# Patient Record
Sex: MALE | Race: WHITE | HISPANIC OR LATINO | URBAN - METROPOLITAN AREA
[De-identification: names, ages, dates, MRNs, and addresses within clinical notes are randomized per-mention and may not be internally consistent; named-entity substitution may affect disease eponyms.]

---

## 2017-12-15 ENCOUNTER — INPATIENT (INPATIENT)
Facility: HOSPITAL | Age: 77
LOS: 33 days | Discharge: NURSING, REHAB OR LONG-TERM CARE | End: 2018-01-18
Attending: HOSPITALIST | Admitting: HOSPITALIST
Payer: MEDICARE

## 2017-12-15 VITALS
OXYGEN SATURATION: 99 % | SYSTOLIC BLOOD PRESSURE: 136 MMHG | RESPIRATION RATE: 16 BRPM | TEMPERATURE: 98 F | HEART RATE: 60 BPM | DIASTOLIC BLOOD PRESSURE: 81 MMHG

## 2017-12-15 DIAGNOSIS — R56.9 UNSPECIFIED CONVULSIONS: ICD-10-CM

## 2017-12-15 LAB
ALBUMIN SERPL ELPH-MCNC: 3.8 G/DL — SIGNIFICANT CHANGE UP (ref 3.3–5)
ALP SERPL-CCNC: 148 U/L — HIGH (ref 40–120)
ALT FLD-CCNC: 11 U/L — SIGNIFICANT CHANGE UP (ref 4–41)
AMPHET UR-MCNC: NEGATIVE — SIGNIFICANT CHANGE UP
APAP SERPL-MCNC: < 15 UG/ML — LOW (ref 15–25)
APPEARANCE UR: CLEAR — SIGNIFICANT CHANGE UP
AST SERPL-CCNC: 20 U/L — SIGNIFICANT CHANGE UP (ref 4–40)
BACTERIA # UR AUTO: SIGNIFICANT CHANGE UP
BARBITURATES MEASUREMENT: NEGATIVE — SIGNIFICANT CHANGE UP
BARBITURATES UR SCN-MCNC: NEGATIVE — SIGNIFICANT CHANGE UP
BASE EXCESS BLDV CALC-SCNC: 2.3 MMOL/L — SIGNIFICANT CHANGE UP
BASOPHILS # BLD AUTO: 0.07 K/UL — SIGNIFICANT CHANGE UP (ref 0–0.2)
BASOPHILS NFR BLD AUTO: 0.5 % — SIGNIFICANT CHANGE UP (ref 0–2)
BENZODIAZ SERPL-MCNC: NEGATIVE — SIGNIFICANT CHANGE UP
BENZODIAZ UR-MCNC: NEGATIVE — SIGNIFICANT CHANGE UP
BILIRUB SERPL-MCNC: 0.5 MG/DL — SIGNIFICANT CHANGE UP (ref 0.2–1.2)
BILIRUB UR-MCNC: NEGATIVE — SIGNIFICANT CHANGE UP
BLOOD GAS VENOUS - CREATININE: 1.35 MG/DL — HIGH (ref 0.5–1.3)
BLOOD UR QL VISUAL: NEGATIVE — SIGNIFICANT CHANGE UP
BUN SERPL-MCNC: 15 MG/DL — SIGNIFICANT CHANGE UP (ref 7–23)
CALCIUM SERPL-MCNC: 9 MG/DL — SIGNIFICANT CHANGE UP (ref 8.4–10.5)
CANNABINOIDS UR-MCNC: NEGATIVE — SIGNIFICANT CHANGE UP
CHLORIDE BLDV-SCNC: 109 MMOL/L — HIGH (ref 96–108)
CHLORIDE SERPL-SCNC: 103 MMOL/L — SIGNIFICANT CHANGE UP (ref 98–107)
CK MB BLD-MCNC: 2.6 — HIGH (ref 0–2.5)
CK MB BLD-MCNC: 4.45 NG/ML — SIGNIFICANT CHANGE UP (ref 1–6.6)
CK SERPL-CCNC: 174 U/L — SIGNIFICANT CHANGE UP (ref 30–200)
CK SERPL-CCNC: 174 U/L — SIGNIFICANT CHANGE UP (ref 30–200)
CO2 SERPL-SCNC: 24 MMOL/L — SIGNIFICANT CHANGE UP (ref 22–31)
COCAINE METAB.OTHER UR-MCNC: NEGATIVE — SIGNIFICANT CHANGE UP
COLOR SPEC: SIGNIFICANT CHANGE UP
CREAT SERPL-MCNC: 1.4 MG/DL — HIGH (ref 0.5–1.3)
EOSINOPHIL # BLD AUTO: 0.02 K/UL — SIGNIFICANT CHANGE UP (ref 0–0.5)
EOSINOPHIL NFR BLD AUTO: 0.1 % — SIGNIFICANT CHANGE UP (ref 0–6)
ETHANOL BLD-MCNC: < 10 MG/DL — SIGNIFICANT CHANGE UP
GAS PNL BLDV: 140 MMOL/L — SIGNIFICANT CHANGE UP (ref 136–146)
GLUCOSE BLDV-MCNC: 87 — SIGNIFICANT CHANGE UP (ref 70–99)
GLUCOSE SERPL-MCNC: 85 MG/DL — SIGNIFICANT CHANGE UP (ref 70–99)
GLUCOSE UR-MCNC: NEGATIVE — SIGNIFICANT CHANGE UP
HCO3 BLDV-SCNC: 25 MMOL/L — SIGNIFICANT CHANGE UP (ref 20–27)
HCT VFR BLD CALC: 38 % — LOW (ref 39–50)
HCT VFR BLDV CALC: 40.2 % — SIGNIFICANT CHANGE UP (ref 39–51)
HGB BLD-MCNC: 12.4 G/DL — LOW (ref 13–17)
HGB BLDV-MCNC: 13.1 G/DL — SIGNIFICANT CHANGE UP (ref 13–17)
HYALINE CASTS # UR AUTO: SIGNIFICANT CHANGE UP (ref 0–?)
IMM GRANULOCYTES # BLD AUTO: 0.07 # — SIGNIFICANT CHANGE UP
IMM GRANULOCYTES NFR BLD AUTO: 0.5 % — SIGNIFICANT CHANGE UP (ref 0–1.5)
KETONES UR-MCNC: NEGATIVE — SIGNIFICANT CHANGE UP
LACTATE BLDV-MCNC: 2.5 MMOL/L — HIGH (ref 0.5–2)
LEUKOCYTE ESTERASE UR-ACNC: NEGATIVE — SIGNIFICANT CHANGE UP
LYMPHOCYTES # BLD AUTO: 0.84 K/UL — LOW (ref 1–3.3)
LYMPHOCYTES # BLD AUTO: 5.7 % — LOW (ref 13–44)
MCHC RBC-ENTMCNC: 29 PG — SIGNIFICANT CHANGE UP (ref 27–34)
MCHC RBC-ENTMCNC: 32.6 % — SIGNIFICANT CHANGE UP (ref 32–36)
MCV RBC AUTO: 89 FL — SIGNIFICANT CHANGE UP (ref 80–100)
METHADONE UR-MCNC: NEGATIVE — SIGNIFICANT CHANGE UP
MONOCYTES # BLD AUTO: 0.98 K/UL — HIGH (ref 0–0.9)
MONOCYTES NFR BLD AUTO: 6.7 % — SIGNIFICANT CHANGE UP (ref 2–14)
MUCOUS THREADS # UR AUTO: SIGNIFICANT CHANGE UP
NEUTROPHILS # BLD AUTO: 12.64 K/UL — HIGH (ref 1.8–7.4)
NEUTROPHILS NFR BLD AUTO: 86.5 % — HIGH (ref 43–77)
NITRITE UR-MCNC: NEGATIVE — SIGNIFICANT CHANGE UP
NRBC # FLD: 0 — SIGNIFICANT CHANGE UP
OPIATES UR-MCNC: NEGATIVE — SIGNIFICANT CHANGE UP
OXYCODONE UR-MCNC: NEGATIVE — SIGNIFICANT CHANGE UP
PCO2 BLDV: 45 MMHG — SIGNIFICANT CHANGE UP (ref 41–51)
PCP UR-MCNC: NEGATIVE — SIGNIFICANT CHANGE UP
PH BLDV: 7.39 PH — SIGNIFICANT CHANGE UP (ref 7.32–7.43)
PH UR: 6 — SIGNIFICANT CHANGE UP (ref 4.6–8)
PLATELET # BLD AUTO: 314 K/UL — SIGNIFICANT CHANGE UP (ref 150–400)
PMV BLD: 8.9 FL — SIGNIFICANT CHANGE UP (ref 7–13)
PO2 BLDV: 29 MMHG — LOW (ref 35–40)
POTASSIUM BLDV-SCNC: 2.4 MMOL/L — CRITICAL LOW (ref 3.4–4.5)
POTASSIUM SERPL-MCNC: 2.8 MMOL/L — CRITICAL LOW (ref 3.5–5.3)
POTASSIUM SERPL-SCNC: 2.8 MMOL/L — CRITICAL LOW (ref 3.5–5.3)
PROT SERPL-MCNC: 7.5 G/DL — SIGNIFICANT CHANGE UP (ref 6–8.3)
PROT UR-MCNC: 10 MG/DL — SIGNIFICANT CHANGE UP
RBC # BLD: 4.27 M/UL — SIGNIFICANT CHANGE UP (ref 4.2–5.8)
RBC # FLD: 14.2 % — SIGNIFICANT CHANGE UP (ref 10.3–14.5)
RBC CASTS # UR COMP ASSIST: SIGNIFICANT CHANGE UP (ref 0–?)
SALICYLATES SERPL-MCNC: < 5 MG/DL — LOW (ref 15–30)
SAO2 % BLDV: 50.7 % — LOW (ref 60–85)
SODIUM SERPL-SCNC: 145 MMOL/L — SIGNIFICANT CHANGE UP (ref 135–145)
SP GR SPEC: 1.01 — SIGNIFICANT CHANGE UP (ref 1–1.04)
SQUAMOUS # UR AUTO: SIGNIFICANT CHANGE UP
TROPONIN T SERPL-MCNC: < 0.06 NG/ML — SIGNIFICANT CHANGE UP (ref 0–0.06)
TSH SERPL-MCNC: 1.46 UIU/ML — SIGNIFICANT CHANGE UP (ref 0.27–4.2)
UROBILINOGEN FLD QL: NORMAL MG/DL — SIGNIFICANT CHANGE UP
WBC # BLD: 14.62 K/UL — HIGH (ref 3.8–10.5)
WBC # FLD AUTO: 14.62 K/UL — HIGH (ref 3.8–10.5)
WBC UR QL: SIGNIFICANT CHANGE UP (ref 0–?)

## 2017-12-15 PROCEDURE — 71010: CPT | Mod: 26

## 2017-12-15 PROCEDURE — 70450 CT HEAD/BRAIN W/O DYE: CPT | Mod: 26

## 2017-12-15 RX ORDER — TETANUS TOXOID, REDUCED DIPHTHERIA TOXOID AND ACELLULAR PERTUSSIS VACCINE, ADSORBED 5; 2.5; 8; 8; 2.5 [IU]/.5ML; [IU]/.5ML; UG/.5ML; UG/.5ML; UG/.5ML
0.5 SUSPENSION INTRAMUSCULAR ONCE
Qty: 0 | Refills: 0 | Status: COMPLETED | OUTPATIENT
Start: 2017-12-15 | End: 2017-12-15

## 2017-12-15 RX ORDER — POTASSIUM CHLORIDE 20 MEQ
10 PACKET (EA) ORAL
Qty: 0 | Refills: 0 | Status: COMPLETED | OUTPATIENT
Start: 2017-12-15 | End: 2017-12-16

## 2017-12-15 RX ORDER — POTASSIUM CHLORIDE 20 MEQ
20 PACKET (EA) ORAL
Qty: 0 | Refills: 0 | Status: DISCONTINUED | OUTPATIENT
Start: 2017-12-15 | End: 2017-12-15

## 2017-12-15 RX ORDER — MAGNESIUM SULFATE 500 MG/ML
2 VIAL (ML) INJECTION ONCE
Qty: 0 | Refills: 0 | Status: COMPLETED | OUTPATIENT
Start: 2017-12-15 | End: 2017-12-15

## 2017-12-15 RX ORDER — LEVETIRACETAM 250 MG/1
500 TABLET, FILM COATED ORAL ONCE
Qty: 0 | Refills: 0 | Status: COMPLETED | OUTPATIENT
Start: 2017-12-15 | End: 2017-12-15

## 2017-12-15 RX ORDER — SODIUM CHLORIDE 9 MG/ML
1000 INJECTION INTRAMUSCULAR; INTRAVENOUS; SUBCUTANEOUS ONCE
Qty: 0 | Refills: 0 | Status: COMPLETED | OUTPATIENT
Start: 2017-12-15 | End: 2017-12-15

## 2017-12-15 RX ADMIN — TETANUS TOXOID, REDUCED DIPHTHERIA TOXOID AND ACELLULAR PERTUSSIS VACCINE, ADSORBED 0.5 MILLILITER(S): 5; 2.5; 8; 8; 2.5 SUSPENSION INTRAMUSCULAR at 22:03

## 2017-12-15 RX ADMIN — Medication 50 GRAM(S): at 23:30

## 2017-12-15 RX ADMIN — Medication 50 MILLIEQUIVALENT(S): at 22:04

## 2017-12-15 RX ADMIN — Medication 2 MILLIGRAM(S): at 21:04

## 2017-12-15 RX ADMIN — Medication 100 MILLIEQUIVALENT(S): at 23:30

## 2017-12-15 RX ADMIN — SODIUM CHLORIDE 2000 MILLILITER(S): 9 INJECTION INTRAMUSCULAR; INTRAVENOUS; SUBCUTANEOUS at 20:49

## 2017-12-15 RX ADMIN — LEVETIRACETAM 400 MILLIGRAM(S): 250 TABLET, FILM COATED ORAL at 22:48

## 2017-12-15 NOTE — CONSULT NOTE ADULT - SUBJECTIVE AND OBJECTIVE BOX
Neurology Consult    Name  DELGADO SUE    Patient is a 77 year old man with unknown PMH BIBEMS found outside shoveling snow soaking wet by neighbors. As per EMS patient was shoveling snow in front of random houses. Patient states he was shoveling snow at his house and does not know why he is at the hospital. Denies any complaints. States that he lives alone and does not know any contact information for family. While in the ED, he was noted to have had 2 seizures, the second breaking with Ativan.                                                          MEDICATIONS  (STANDING):  potassium chloride  20 mEq/100 mL IVPB 20 milliEquivalent(s) IV Intermittent every 2 hours    MEDICATIONS  (PRN):      Allergies    No Known Allergies    Intolerances        Objective  Vital Signs Last 24 Hrs  T(C): 37.1 (15 Dec 2017 20:07), Max: 37.1 (15 Dec 2017 20:07)  T(F): 98.8 (15 Dec 2017 20:07), Max: 98.8 (15 Dec 2017 20:07)  HR: 98 (15 Dec 2017 21:00) (60 - 98)  BP: 224/100 (15 Dec 2017 21:00) (136/81 - 224/100)  BP(mean): --  RR: 18 (15 Dec 2017 21:00) (16 - 18)  SpO2: 98% (15 Dec 2017 21:00) (98% - 99%)    General Exam   General appearance: No acute distress, well-nourished  Respiratory:    non-labored respirations               Neurological Exam  Mental Status:  alert and oriented x3, fluent speech, following commands, repetition and naming intact    Cranial Nerves: PERRL, EOMI without nystagmus, visual fields intact no facial droop, no dysarthria    Motor:   Tone:   normal               Strength:  Upper extremity                          Delt       Bicep    Tricep                                                  R         5/5        5/5        5/5       5/5                                               L          5/5        5/5        5/5      5/5    Lower extremity                           HF          KE          KF        DF         PF                                               R        5/5        5/5        5/5       5/5       5/5                                               L         5/5        5/5        5/5      5/5        5/5    Pronator drift:   none           Dysmetria: none with finger-to-nose testing  Tremor:  none appreciated at rest or in action    Sensation: intact grossly to light touch    Deep Tendon Reflexes:   Toes flexor bilaterally ________    Gait:     Other Studies    12-15    145  |  103  |  15  ----------------------------<  85  2.8<LL>   |  24  |  1.40<H>    Ca    9.0      15 Dec 2017 19:40    TPro  7.5  /  Alb  3.8  /  TBili  0.5  /  DBili  x   /  AST  20  /  ALT  11  /  AlkPhos  148<H>  12-15    12-15    145  |  103  |  15  ----------------------------<  85  2.8<LL>   |  24  |  1.40<H>    Ca    9.0      15 Dec 2017 19:40    TPro  7.5  /  Alb  3.8  /  TBili  0.5  /  DBili  x   /  AST  20  /  ALT  11  /  AlkPhos  148<H>  12-15    LIVER FUNCTIONS - ( 15 Dec 2017 19:40 )  Alb: 3.8 g/dL / Pro: 7.5 g/dL / ALK PHOS: 148 u/L / ALT: 11 u/L / AST: 20 u/L / GGT: x             Radiology    CTh/CTA:  MRI/MRA:  TTE:  EEG: Neurology Consult    Name  DELGADO SUE    Patient is a 77 year old man with unknown PMH BIBEMS found outside shoveling snow soaking wet by neighbors. As per EMS patient was shoveling snow in front of random houses. Patient states he was shoveling snow at his house and does not know why he is at the hospital. Denies any complaints. States that he lives alone and does not know any contact information for family. While in the ED, he was noted to have had 2 seizures, treated with 2mg Ativan.                                                            MEDICATIONS  (STANDING):  potassium chloride  20 mEq/100 mL IVPB 20 milliEquivalent(s) IV Intermittent every 2 hours    MEDICATIONS  (PRN):      Allergies    No Known Allergies    Intolerances        Objective  Vital Signs Last 24 Hrs  T(C): 37.1 (15 Dec 2017 20:07), Max: 37.1 (15 Dec 2017 20:07)  T(F): 98.8 (15 Dec 2017 20:07), Max: 98.8 (15 Dec 2017 20:07)  HR: 98 (15 Dec 2017 21:00) (60 - 98)  BP: 224/100 (15 Dec 2017 21:00) (136/81 - 224/100)  BP(mean): --  RR: 18 (15 Dec 2017 21:00) (16 - 18)  SpO2: 98% (15 Dec 2017 21:00) (98% - 99%)    General Exam   General appearance: No acute distress, well-nourished  Respiratory:    non-labored respirations               Neurological Exam  Mental Status:  awake, not oriented, not following commands    Cranial Nerves: PERRL, EOMI without nystagmus, visual fields intact no facial droop, no dysarthria    Motor:   Tone:   normal               Strength:  Moving all extremities spontaneously    Tremor:  none appreciated at rest or in action    Sensation: intact grossly to light touch    Deep Tendon Reflexes: 2+ throughout  Toes flexor bilaterally     Gait: deferred    Other Studies    12-15    145  |  103  |  15  ----------------------------<  85  2.8<LL>   |  24  |  1.40<H>    Ca    9.0      15 Dec 2017 19:40    TPro  7.5  /  Alb  3.8  /  TBili  0.5  /  DBili  x   /  AST  20  /  ALT  11  /  AlkPhos  148<H>  12-15    12-15    145  |  103  |  15  ----------------------------<  85  2.8<LL>   |  24  |  1.40<H>    Ca    9.0      15 Dec 2017 19:40    TPro  7.5  /  Alb  3.8  /  TBili  0.5  /  DBili  x   /  AST  20  /  ALT  11  /  AlkPhos  148<H>  12-15    LIVER FUNCTIONS - ( 15 Dec 2017 19:40 )  Alb: 3.8 g/dL / Pro: 7.5 g/dL / ALK PHOS: 148 u/L / ALT: 11 u/L / AST: 20 u/L / GGT: x             Radiology    CTH:  No acute intracranial hemorrhage, mass effect or evidence of acute   territorial infarct. Mild ventriculomegaly superimposed on cerebral   volume loss.

## 2017-12-15 NOTE — ED PROVIDER NOTE - MEDICAL DECISION MAKING DETAILS
77F unknown PMH found shoveling snow in random houses. Will workup for AMS and attempt to call family. Will order cbc, cmp, vbg, utox, ua/uc, cxr, ct head and reassess

## 2017-12-15 NOTE — CONSULT NOTE ADULT - ASSESSMENT
77 year old man with unknown PMH BIBEMS found outside shoveling snow soaking wet by neighbors, brought to ED, where he had 2 witnessed seizures, treated w/ Ativan.  Neurological exam limited by patient participation/mental status.  CTH showing no acute abnormalities.    Plan:  - start Keppra 500mg BID  - routine EEG  - attempt to contact family for collateral history

## 2017-12-15 NOTE — ED ADULT NURSE REASSESSMENT NOTE - NS ED NURSE REASSESS COMMENT FT1
No acute distress at present. Pt. is resting comfortably. Respirations are even and unlabored on room air. Pt. is admitted to tele bed 406C. Report given to SHANNAN Delong. Pt. is awaiting transport at present. Will continue to monitor.

## 2017-12-15 NOTE — ED PROVIDER NOTE - OBJECTIVE STATEMENT
77M with unknown PMH BIBEMS found outside shoveling snow soaking wet by neighbors. As per EMS patient was shoveling 77M with unknown PMH BIBEMS found outside shoveling snow soaking wet by neighbors. As per EMS patient was shoveling snow in random houses. Patient states he was shoveling snow at his house and does not know why he is at the hospital. Denies any complaints. 77M with unknown PMH BIBEMS found outside shoveling snow soaking wet by neighbors. As per EMS patient was shoveling snow in front of random houses. Patient states he was shoveling snow at his house and does not know why he is at the hospital. Denies any complaints. States that he lives alone and does not know any contact information for family.

## 2017-12-15 NOTE — ED ADULT TRIAGE NOTE - CHIEF COMPLAINT QUOTE
Pt brought in from home by EMS, pt was outside shoveling x2 hrs was found outside by ems soaking wet, concerned neighbor or called 911. PMHx dementia. Pt denies any medical complaints

## 2017-12-15 NOTE — ED ADULT NURSE REASSESSMENT NOTE - NS ED NURSE REASSESS COMMENT FT1
Pt. had 2 seizure episodes within 10 minutes, lasting 1 minute each episode. MD Mayorga, MD Fountain, and MD Sylvester at bedside during seizures. 2 mg of Ativan given IV push. Pt. is to be taken to CT by transporter as per MD Mayorga. Pt. being transported at present.

## 2017-12-15 NOTE — ED PROVIDER NOTE - ATTENDING CONTRIBUTION TO CARE
77M p/w found outside during snowstorm, apparently was shoveling snow on others' property, EMS called, pt was soaking wet.  Pt nonverbal during our communication, makes eye contact but unable to relate any details of himself, intermittently follows simple commands.  No previous visits in our EMR.  Phone number given by EMS rings with no answer or answering machine.  VS:  unremarkable    GEN - mild distress, alert, nonverbal   HEAD - NC/AT     ENT - PEERL, EOMI, mucous membranes dry , no discharge      NECK: Neck supple, non-tender without lymphadenopathy, no masses, no JVD  PULM - CTA b/l,  symmetric breath sounds  COR -  normal heart sounds    ABD - , ND, NT, soft, no guarding, no rebound, no masses    BACK - no CVA tenderness, nontender spine     EXTREMS - no edema, no deformity, warm and well perfused    SKIN - no rash or bruising      NEUROLOGIC - alert, moves all extremities but exam limited due to limited following of commands.      IMP:    77M p/w found acting inappropriately BIBEMS.  No other information given, pt currently nonverbal.  NB after initial eval pt found to have GTC sz lasting approx 1-2 minutes with post ictal state, FS adequate, pt made safe.  10 mins later had another sz, same.  Given ativan at that time, held initially due to unclear mechanism or history, didn't want to oversedate him.  No apparent injury, normothermic.  Potentially CVA affecting speech center vs delirium.  Check labs, CXR, CT head, give fluids, replete lytes, neuro eval, admit for further w/u.

## 2017-12-15 NOTE — ED ADULT NURSE NOTE - OBJECTIVE STATEMENT
The patient is a 76 y/o male alert and oriented to person only BIB EMS found wandering on street.  Patient is calm and cooperative, confused however redirectable.  Social Work (Misael [phone]) contacted and advised.  The patient offers no complaints, is minimally verbal and refuses answer most questions.  Patient refused to answer question about CP, SOB, GI/ symptoms, n/v/d, fevers/chills.  Patient placed in hospital gowns, belongings placed in locker across from room 21.  VSS, patient in nad, MD evaluating. The patient is a 76 y/o male alert and oriented to person only BIB EMS found wandering on street.  Patient is calm and cooperative, confused however redirectable.  Social Work (Misael [phone]) contacted and advised.  The patient offers no complaints, is minimally verbal and refuses answer most questions.  Patient refused to answer question about CP, SOB, GI/ symptoms, n/v/d, fevers/chills.  Patient placed in hospital gowns, belongings placed in locker across from room 21.  VSS, patient in nad, MD evaluating.  Valuables including wallet, sent to [facility] security office safe.  Yellow copy to retrieve belongings from safe placed in patient's chart.

## 2017-12-16 DIAGNOSIS — Z29.9 ENCOUNTER FOR PROPHYLACTIC MEASURES, UNSPECIFIED: ICD-10-CM

## 2017-12-16 DIAGNOSIS — D72.829 ELEVATED WHITE BLOOD CELL COUNT, UNSPECIFIED: ICD-10-CM

## 2017-12-16 DIAGNOSIS — F03.90 UNSPECIFIED DEMENTIA WITHOUT BEHAVIORAL DISTURBANCE: ICD-10-CM

## 2017-12-16 DIAGNOSIS — N28.9 DISORDER OF KIDNEY AND URETER, UNSPECIFIED: ICD-10-CM

## 2017-12-16 DIAGNOSIS — E87.6 HYPOKALEMIA: ICD-10-CM

## 2017-12-16 DIAGNOSIS — R56.9 UNSPECIFIED CONVULSIONS: ICD-10-CM

## 2017-12-16 LAB
ALBUMIN SERPL ELPH-MCNC: 3.3 G/DL — SIGNIFICANT CHANGE UP (ref 3.3–5)
ALP SERPL-CCNC: 137 U/L — HIGH (ref 40–120)
ALT FLD-CCNC: 11 U/L — SIGNIFICANT CHANGE UP (ref 4–41)
APAP SERPL-MCNC: < 15 UG/ML — LOW (ref 15–25)
AST SERPL-CCNC: 23 U/L — SIGNIFICANT CHANGE UP (ref 4–40)
BARBITURATES MEASUREMENT: NEGATIVE — SIGNIFICANT CHANGE UP
BENZODIAZ SERPL-MCNC: NEGATIVE — SIGNIFICANT CHANGE UP
BILIRUB SERPL-MCNC: 0.5 MG/DL — SIGNIFICANT CHANGE UP (ref 0.2–1.2)
BUN SERPL-MCNC: 11 MG/DL — SIGNIFICANT CHANGE UP (ref 7–23)
CALCIUM SERPL-MCNC: 8.4 MG/DL — SIGNIFICANT CHANGE UP (ref 8.4–10.5)
CHLORIDE SERPL-SCNC: 105 MMOL/L — SIGNIFICANT CHANGE UP (ref 98–107)
CHOLEST SERPL-MCNC: 170 MG/DL — SIGNIFICANT CHANGE UP (ref 120–199)
CK MB BLD-MCNC: 3.98 NG/ML — SIGNIFICANT CHANGE UP (ref 1–6.6)
CK SERPL-CCNC: 170 U/L — SIGNIFICANT CHANGE UP (ref 30–200)
CO2 SERPL-SCNC: 23 MMOL/L — SIGNIFICANT CHANGE UP (ref 22–31)
CREAT SERPL-MCNC: 1.13 MG/DL — SIGNIFICANT CHANGE UP (ref 0.5–1.3)
ETHANOL BLD-MCNC: < 10 MG/DL — SIGNIFICANT CHANGE UP
GLUCOSE SERPL-MCNC: 70 MG/DL — SIGNIFICANT CHANGE UP (ref 70–99)
HBA1C BLD-MCNC: 5.5 % — SIGNIFICANT CHANGE UP (ref 4–5.6)
HCT VFR BLD CALC: 38.3 % — LOW (ref 39–50)
HDLC SERPL-MCNC: 85 MG/DL — HIGH (ref 35–55)
HGB BLD-MCNC: 12.2 G/DL — LOW (ref 13–17)
LIPID PNL WITH DIRECT LDL SERPL: 88 MG/DL — SIGNIFICANT CHANGE UP
MAGNESIUM SERPL-MCNC: 2 MG/DL — SIGNIFICANT CHANGE UP (ref 1.6–2.6)
MAGNESIUM SERPL-MCNC: 2.6 MG/DL — SIGNIFICANT CHANGE UP (ref 1.6–2.6)
MCHC RBC-ENTMCNC: 28.9 PG — SIGNIFICANT CHANGE UP (ref 27–34)
MCHC RBC-ENTMCNC: 31.9 % — LOW (ref 32–36)
MCV RBC AUTO: 90.8 FL — SIGNIFICANT CHANGE UP (ref 80–100)
NRBC # FLD: 0 — SIGNIFICANT CHANGE UP
PLATELET # BLD AUTO: 252 K/UL — SIGNIFICANT CHANGE UP (ref 150–400)
PMV BLD: 9.1 FL — SIGNIFICANT CHANGE UP (ref 7–13)
POTASSIUM SERPL-MCNC: 3.4 MMOL/L — LOW (ref 3.5–5.3)
POTASSIUM SERPL-SCNC: 3.4 MMOL/L — LOW (ref 3.5–5.3)
PROT SERPL-MCNC: 7 G/DL — SIGNIFICANT CHANGE UP (ref 6–8.3)
RBC # BLD: 4.22 M/UL — SIGNIFICANT CHANGE UP (ref 4.2–5.8)
RBC # FLD: 14.6 % — HIGH (ref 10.3–14.5)
SALICYLATES SERPL-MCNC: < 5 MG/DL — LOW (ref 15–30)
SODIUM SERPL-SCNC: 143 MMOL/L — SIGNIFICANT CHANGE UP (ref 135–145)
TRIGL SERPL-MCNC: 41 MG/DL — SIGNIFICANT CHANGE UP (ref 10–149)
TROPONIN T SERPL-MCNC: < 0.06 NG/ML — SIGNIFICANT CHANGE UP (ref 0–0.06)
WBC # BLD: 10.58 K/UL — HIGH (ref 3.8–10.5)
WBC # FLD AUTO: 10.58 K/UL — HIGH (ref 3.8–10.5)

## 2017-12-16 PROCEDURE — 99233 SBSQ HOSP IP/OBS HIGH 50: CPT

## 2017-12-16 PROCEDURE — 99223 1ST HOSP IP/OBS HIGH 75: CPT | Mod: GC

## 2017-12-16 RX ORDER — MULTIVIT-MIN/FERROUS GLUCONATE 9 MG/15 ML
1 LIQUID (ML) ORAL DAILY
Qty: 0 | Refills: 0 | Status: DISCONTINUED | OUTPATIENT
Start: 2017-12-16 | End: 2017-12-16

## 2017-12-16 RX ORDER — SODIUM CHLORIDE 9 MG/ML
1000 INJECTION INTRAMUSCULAR; INTRAVENOUS; SUBCUTANEOUS
Qty: 0 | Refills: 0 | Status: DISCONTINUED | OUTPATIENT
Start: 2017-12-16 | End: 2017-12-16

## 2017-12-16 RX ORDER — LEVETIRACETAM 250 MG/1
500 TABLET, FILM COATED ORAL EVERY 12 HOURS
Qty: 0 | Refills: 0 | Status: DISCONTINUED | OUTPATIENT
Start: 2017-12-16 | End: 2017-12-17

## 2017-12-16 RX ORDER — SODIUM CHLORIDE 9 MG/ML
1000 INJECTION, SOLUTION INTRAVENOUS
Qty: 0 | Refills: 0 | Status: DISCONTINUED | OUTPATIENT
Start: 2017-12-16 | End: 2017-12-16

## 2017-12-16 RX ORDER — POTASSIUM CHLORIDE 20 MEQ
10 PACKET (EA) ORAL
Qty: 0 | Refills: 0 | Status: COMPLETED | OUTPATIENT
Start: 2017-12-16 | End: 2017-12-16

## 2017-12-16 RX ORDER — HEPARIN SODIUM 5000 [USP'U]/ML
5000 INJECTION INTRAVENOUS; SUBCUTANEOUS EVERY 12 HOURS
Qty: 0 | Refills: 0 | Status: DISCONTINUED | OUTPATIENT
Start: 2017-12-16 | End: 2017-12-27

## 2017-12-16 RX ORDER — POTASSIUM CHLORIDE 20 MEQ
40 PACKET (EA) ORAL EVERY 4 HOURS
Qty: 0 | Refills: 0 | Status: DISCONTINUED | OUTPATIENT
Start: 2017-12-16 | End: 2017-12-16

## 2017-12-16 RX ORDER — SODIUM CHLORIDE 9 MG/ML
3 INJECTION INTRAMUSCULAR; INTRAVENOUS; SUBCUTANEOUS EVERY 8 HOURS
Qty: 0 | Refills: 0 | Status: DISCONTINUED | OUTPATIENT
Start: 2017-12-16 | End: 2018-01-18

## 2017-12-16 RX ORDER — POTASSIUM CHLORIDE 20 MEQ
40 PACKET (EA) ORAL ONCE
Qty: 0 | Refills: 0 | Status: COMPLETED | OUTPATIENT
Start: 2017-12-16 | End: 2017-12-16

## 2017-12-16 RX ADMIN — Medication 100 MILLIEQUIVALENT(S): at 06:42

## 2017-12-16 RX ADMIN — SODIUM CHLORIDE 3 MILLILITER(S): 9 INJECTION INTRAMUSCULAR; INTRAVENOUS; SUBCUTANEOUS at 13:39

## 2017-12-16 RX ADMIN — LEVETIRACETAM 400 MILLIGRAM(S): 250 TABLET, FILM COATED ORAL at 05:35

## 2017-12-16 RX ADMIN — LEVETIRACETAM 400 MILLIGRAM(S): 250 TABLET, FILM COATED ORAL at 17:34

## 2017-12-16 RX ADMIN — Medication 100 MILLIEQUIVALENT(S): at 05:17

## 2017-12-16 RX ADMIN — Medication 100 MILLIEQUIVALENT(S): at 01:05

## 2017-12-16 RX ADMIN — Medication 100 MILLIEQUIVALENT(S): at 03:51

## 2017-12-16 RX ADMIN — SODIUM CHLORIDE 50 MILLILITER(S): 9 INJECTION INTRAMUSCULAR; INTRAVENOUS; SUBCUTANEOUS at 03:10

## 2017-12-16 RX ADMIN — Medication 40 MILLIEQUIVALENT(S): at 11:59

## 2017-12-16 RX ADMIN — Medication 100 MILLIEQUIVALENT(S): at 02:31

## 2017-12-16 RX ADMIN — HEPARIN SODIUM 5000 UNIT(S): 5000 INJECTION INTRAVENOUS; SUBCUTANEOUS at 17:35

## 2017-12-16 RX ADMIN — SODIUM CHLORIDE 3 MILLILITER(S): 9 INJECTION INTRAMUSCULAR; INTRAVENOUS; SUBCUTANEOUS at 21:36

## 2017-12-16 RX ADMIN — Medication 1 TABLET(S): at 11:59

## 2017-12-16 RX ADMIN — SODIUM CHLORIDE 60 MILLILITER(S): 9 INJECTION, SOLUTION INTRAVENOUS at 06:59

## 2017-12-16 RX ADMIN — HEPARIN SODIUM 5000 UNIT(S): 5000 INJECTION INTRAVENOUS; SUBCUTANEOUS at 05:35

## 2017-12-16 RX ADMIN — SODIUM CHLORIDE 3 MILLILITER(S): 9 INJECTION INTRAMUSCULAR; INTRAVENOUS; SUBCUTANEOUS at 05:23

## 2017-12-16 NOTE — H&P ADULT - NSHPSOCIALHISTORY_GEN_ALL_CORE
and lives with stepdaughter.   Denies Nicotine.   Denies ETOH.   Lives alone (stepdaughter checks in on him at intervals)  Denies Nicotine.   Denies ETOH.

## 2017-12-16 NOTE — PATIENT PROFILE ADULT. - PAIN CHRONIC, PROFILE
Post-Anesthesia Evaluation and Assessment    Patient: Diane Garg MRN: [id number]  SSN: [id number]    YOB: 1987  Age: 31 y.o. Sex: male       Cardiovascular Function/Vital Signs  Visit Vitals    BP (!) 161/97 (BP 1 Location: Right arm, BP Patient Position: At rest)    Pulse 86    Temp 36.8 °C (98.2 °F)    Resp 18    Ht 5' 6\" (1.676 m)    Wt 83.9 kg (185 lb)    SpO2 98%    BMI 29.86 kg/m2       Patient is status post MAC anesthesia for Procedure(s):  ESOPHAGOGASTRODUODENOSCOPY (EGD)  ESOPHAGOGASTRODUODENAL (EGD) BIOPSY. Nausea/Vomiting: None    Postoperative hydration reviewed and adequate. Pain:  Pain Scale 1: Numeric (0 - 10) (06/01/17 1215)  Pain Intensity 1: 10 (06/01/17 1215)   Managed    Neurological Status: At baseline    Mental Status and Level of Consciousness: Arousable    Pulmonary Status:   O2 Device: Room air (06/01/17 1140)   Adequate oxygenation and airway patent    Complications related to anesthesia: None    Post-anesthesia assessment completed.  No concerns    Signed By: Calderon Tena MD     June 1, 2017 no

## 2017-12-16 NOTE — H&P ADULT - GASTROINTESTINAL DETAILS
no distention/no guarding/soft/no masses palpable/bowel sounds normal/no bruit/no organomegaly/nontender/no rebound tenderness

## 2017-12-16 NOTE — PROGRESS NOTE ADULT - PROBLEM SELECTOR PLAN 1
-Continue with Keppra 500mg IV Q12hrs  -Dysphagia screen, can change Keppra to po when able to eat  -Follow-up EEG -Continue with Keppra 500mg IV Q12hrs  -Dysphagia screen, can change Keppra to po when able to eat  -Follow-up EEG  -No events on tele, will dc TTE

## 2017-12-16 NOTE — H&P ADULT - PROBLEM SELECTOR PLAN 2
Heparin 5000U sub cut BID. F/u Renal sono.  F/U BMP. F/u Renal sono.  F/U BMP.  IVF with NS @ 50ml/ hour F/u Renal sono.  F/U BMP, CPK level (in the setting of 2 seizure activities)  S/P 1 liter Ns in ED; continuing w/ IVF with NS @ 50ml/ hour

## 2017-12-16 NOTE — H&P ADULT - PROBLEM SELECTOR PLAN 1
CM,   F/U CE, EKG. TTE, EEG, A!C, FLP.  Neuro consult appreciated.  Starting Keppra 500 mg IV BID.  Fall, Aspiration, Safety, Seizure precautions.   F/U Dysphagia screen. Report of 2 undescribed events in the ED - in patient with no prior history  CM,   Urine and Serum toxicology negative  CT of head without any acute findings  TSH within acceptable range (1.46)  F/U CE, EKG. TTE, EEG, A!C, FLP  Neuro consult appreciated; starting Keppra 500 mg IV BID.  Fall, Aspiration, Safety, Seizure precautions.   F/U Dysphagia screen.  S/P 1 liter NS; will continue with maintenance at 50 mL/Hr x 24 hours

## 2017-12-16 NOTE — PROGRESS NOTE ADULT - ASSESSMENT
76 yo M with dementia (unclear baseline), p/w episode of obtundation with course c/b seizures x 2 in the ED that resolved with Ativan. Doing well after being started on Keppra.

## 2017-12-16 NOTE — H&P ADULT - PROBLEM SELECTOR PLAN 4
Heparin 5000U sub cut BID. A febrile.  No source yet.  F/u Offical CXR. A febrile.  No source yet.  Most likely due to stress demargination  F/u Offical CXR report

## 2017-12-16 NOTE — H&P ADULT - PMH
No pertinent past medical history Dementia without behavioral disturbance, unspecified dementia type

## 2017-12-16 NOTE — PROGRESS NOTE ADULT - SUBJECTIVE AND OBJECTIVE BOX
Patient is a 77y old  Male who presents with a chief complaint of Seizure x 1 day (16 Dec 2017 01:26)      SUBJECTIVE / OVERNIGHT EVENTS: No acute events. No tele events. Patient more awake today. Passed dysphagia screen today per nursing. Patient confused but without complaints. Doesn't know why or how he got to the hospital.     MEDICATIONS  (STANDING):  heparin  Injectable 5000 Unit(s) SubCutaneous every 12 hours  lactated ringers. 1000 milliLiter(s) (60 mL/Hr) IV Continuous <Continuous>  levETIRAcetam  IVPB 500 milliGRAM(s) IV Intermittent every 12 hours  multivitamin 1 Tablet(s) Oral daily  sodium chloride 0.9% lock flush 3 milliLiter(s) IV Push every 8 hours    MEDICATIONS  (PRN):      Vital Signs Last 24 Hrs  T(C): 36.7 (17 @ 05:27)  T(F): 98 (17 @ 05:27), Max: 98.8 (12-15-17 @ 20:07)  HR: 69 (17 @ 05:27) (60 - 98)  BP: 155/98 (17 @ 05:27)  BP(mean): --  RR: 16 (17 @ 05:27) (16 - 18)  SpO2: 100% (17 @ 05:27) (96% - 100%)  Wt(kg): --    CAPILLARY BLOOD GLUCOSE      POCT Blood Glucose.: 103 mg/dL (15 Dec 2017 20:45)    I&O's Summary      PHYSICAL EXAM:  GENERAL: NAD, well-developed  HEAD:  Atraumatic, Normocephalic  EYES: EOMI, PERRLA, conjunctiva and sclera clear  NECK: Supple, No JVD  CHEST/LUNG: Clear to auscultation bilaterally; No wheeze  HEART: Regular rate and rhythm; No murmurs, rubs, or gallops  ABDOMEN: Soft, Nontender, Nondistended; Bowel sounds present  EXTREMITIES:  2+ Peripheral Pulses, No clubbing, cyanosis, or edema  PSYCH: AAOx3  NEUROLOGY: non-focal  SKIN: No rashes or lesions    LABS:                        12.2   10.58 )-----------( 252      ( 16 Dec 2017 02:40 )             38.3     12-    143  |  105  |  11  ----------------------------<  70  3.4<L>   |  23  |  1.13    Ca    8.4      16 Dec 2017 05:32  Mg     2.6     12-16    TPro  7.0  /  Alb  3.3  /  TBili  0.5  /  DBili  x   /  AST  23  /  ALT  11  /  AlkPhos  137<H>  12-16      CARDIAC MARKERS ( 16 Dec 2017 05:32 )  x     / < 0.06 ng/mL / 170 u/L / 3.98 ng/mL / x      CARDIAC MARKERS ( 15 Dec 2017 19:40 )  x     / < 0.06 ng/mL / 174 u/L / 4.45 ng/mL / x          Urinalysis Basic - ( 15 Dec 2017 19:55 )    Color: COLORL / Appearance: CLEAR / S.007 / pH: 6.0  Gluc: NEGATIVE / Ketone: NEGATIVE  / Bili: NEGATIVE / Urobili: NORMAL mg/dL   Blood: NEGATIVE / Protein: 10 mg/dL / Nitrite: NEGATIVE   Leuk Esterase: NEGATIVE / RBC: 0-2 / WBC 0-2   Sq Epi: OCC / Non Sq Epi: x / Bacteria: FEW        RADIOLOGY & ADDITIONAL TESTS:    Imaging Personally Reviewed:  < from: CT Head No Cont (12.15.17 @ 21:24) >  IMPRESSION:   No acute intracranial hemorrhage, mass effect or evidence of acute   territorial infarct. Mild ventriculomegaly superimposed on cerebral   volume loss.    < end of copied text >      Consultant(s) Notes Reviewed:  Neurology    Care Discussed with Consultants/Other Providers:    Assessment and Plan: Patient is a 77y old  Male who presents with a chief complaint of Seizure x 1 day (16 Dec 2017 01:26)      SUBJECTIVE / OVERNIGHT EVENTS: No acute events. No tele events. Patient more awake today. Passed dysphagia screen today per nursing. Patient confused but without complaints. Doesn't know why or how he got to the hospital. Reports he lives alone but that he has a sister who checks in on him.    MEDICATIONS  (STANDING):  heparin  Injectable 5000 Unit(s) SubCutaneous every 12 hours  lactated ringers. 1000 milliLiter(s) (60 mL/Hr) IV Continuous <Continuous>  levETIRAcetam  IVPB 500 milliGRAM(s) IV Intermittent every 12 hours  multivitamin 1 Tablet(s) Oral daily  sodium chloride 0.9% lock flush 3 milliLiter(s) IV Push every 8 hours    MEDICATIONS  (PRN):      Vital Signs Last 24 Hrs  T(C): 36.7 (17 @ 05:27)  T(F): 98 (17 @ 05:27), Max: 98.8 (12-15-17 @ 20:07)  HR: 69 (17 @ 05:27) (60 - 98)  BP: 155/98 (17 @ 05:27)  BP(mean): --  RR: 16 (17 @ 05:27) (16 - 18)  SpO2: 100% (17 @ 05:27) (96% - 100%)  Wt(kg): --    CAPILLARY BLOOD GLUCOSE      POCT Blood Glucose.: 103 mg/dL (15 Dec 2017 20:45)    I&O's Summary      PHYSICAL EXAM:  GENERAL: NAD, well-developed  HEAD:  Atraumatic, Normocephalic  EYES: EOMI, PERRLA, conjunctiva and sclera clear  NECK: Supple, No JVD  CHEST/LUNG: Clear to auscultation bilaterally; No wheeze  HEART: Regular rate and rhythm; No murmurs, rubs, or gallops  ABDOMEN: Soft, Nontender, Nondistended; Bowel sounds present  EXTREMITIES:  2+ Peripheral Pulses, No clubbing, cyanosis, or edema  PSYCH: AAOx3  NEUROLOGY: non-focal  SKIN: No rashes or lesions    LABS:                        12.2   10.58 )-----------( 252      ( 16 Dec 2017 02:40 )             38.3     -    143  |  105  |  11  ----------------------------<  70  3.4<L>   |  23  |  1.13    Ca    8.4      16 Dec 2017 05:32  Mg     2.6         TPro  7.0  /  Alb  3.3  /  TBili  0.5  /  DBili  x   /  AST  23  /  ALT  11  /  AlkPhos  137<H>  12-      CARDIAC MARKERS ( 16 Dec 2017 05:32 )  x     / < 0.06 ng/mL / 170 u/L / 3.98 ng/mL / x      CARDIAC MARKERS ( 15 Dec 2017 19:40 )  x     / < 0.06 ng/mL / 174 u/L / 4.45 ng/mL / x          Urinalysis Basic - ( 15 Dec 2017 19:55 )    Color: COLORL / Appearance: CLEAR / S.007 / pH: 6.0  Gluc: NEGATIVE / Ketone: NEGATIVE  / Bili: NEGATIVE / Urobili: NORMAL mg/dL   Blood: NEGATIVE / Protein: 10 mg/dL / Nitrite: NEGATIVE   Leuk Esterase: NEGATIVE / RBC: 0-2 / WBC 0-2   Sq Epi: OCC / Non Sq Epi: x / Bacteria: FEW        RADIOLOGY & ADDITIONAL TESTS:    Imaging Personally Reviewed:  < from: CT Head No Cont (12.15.17 @ 21:24) >  IMPRESSION:   No acute intracranial hemorrhage, mass effect or evidence of acute   territorial infarct. Mild ventriculomegaly superimposed on cerebral   volume loss.    < end of copied text >      Consultant(s) Notes Reviewed:  Neurology    Care Discussed with Consultants/Other Providers:    Assessment and Plan: Patient is a 77y old  Male who presents with a chief complaint of Seizure x 1 day (16 Dec 2017 01:26)      SUBJECTIVE / OVERNIGHT EVENTS: No acute events. No tele events. Patient more awake today. Passed dysphagia screen today per nursing. Patient confused but without complaints. Doesn't know why or how he got to the hospital. Reports he lives alone but that he has a sister who checks in on him.    MEDICATIONS  (STANDING):  heparin  Injectable 5000 Unit(s) SubCutaneous every 12 hours  lactated ringers. 1000 milliLiter(s) (60 mL/Hr) IV Continuous <Continuous>  levETIRAcetam  IVPB 500 milliGRAM(s) IV Intermittent every 12 hours  multivitamin 1 Tablet(s) Oral daily  sodium chloride 0.9% lock flush 3 milliLiter(s) IV Push every 8 hours    MEDICATIONS  (PRN):      Vital Signs Last 24 Hrs  T(C): 36.7 (17 @ 05:27)  T(F): 98 (17 @ 05:27), Max: 98.8 (12-15-17 @ 20:07)  HR: 69 (17 @ 05:27) (60 - 98)  BP: 155/98 (17 @ 05:27)  BP(mean): --  RR: 16 (17 @ 05:27) (16 - 18)  SpO2: 100% (17 @ 05:27) (96% - 100%)  Wt(kg): --    CAPILLARY BLOOD GLUCOSE      POCT Blood Glucose.: 103 mg/dL (15 Dec 2017 20:45)    I&O's Summary      PHYSICAL EXAM:  GENERAL: NAD, well-developed, agitated  HEAD:  Atraumatic, Normocephalic  EYES: EOMI, PERRLA, conjunctiva and sclera clear  NECK: Supple, No JVD  CHEST/LUNG: Clear to auscultation bilaterally; No wheeze  HEART: Regular rate and rhythm; No murmurs, rubs, or gallops  ABDOMEN: Soft, Nontender, Nondistended; Bowel sounds present  EXTREMITIES:  2+ Peripheral Pulses, No clubbing, cyanosis, or edema  PSYCH: AAOx1-2 (knows he is in a hospital but not which or where it is), agitated, tries to climb out of bed, doesn't answe all questions  NEUROLOGY: non-focal  SKIN: +old lesion on scalp, LLE abrasions    LABS:                        12.2   10.58 )-----------( 252      ( 16 Dec 2017 02:40 )             38.3         143  |  105  |  11  ----------------------------<  70  3.4<L>   |  23  |  1.13    Ca    8.4      16 Dec 2017 05:32  Mg     2.6         TPro  7.0  /  Alb  3.3  /  TBili  0.5  /  DBili  x   /  AST  23  /  ALT  11  /  AlkPhos  137<H>  12-      CARDIAC MARKERS ( 16 Dec 2017 05:32 )  x     / < 0.06 ng/mL / 170 u/L / 3.98 ng/mL / x      CARDIAC MARKERS ( 15 Dec 2017 19:40 )  x     / < 0.06 ng/mL / 174 u/L / 4.45 ng/mL / x          Urinalysis Basic - ( 15 Dec 2017 19:55 )    Color: COLORL / Appearance: CLEAR / S.007 / pH: 6.0  Gluc: NEGATIVE / Ketone: NEGATIVE  / Bili: NEGATIVE / Urobili: NORMAL mg/dL   Blood: NEGATIVE / Protein: 10 mg/dL / Nitrite: NEGATIVE   Leuk Esterase: NEGATIVE / RBC: 0-2 / WBC 0-2   Sq Epi: OCC / Non Sq Epi: x / Bacteria: FEW        RADIOLOGY & ADDITIONAL TESTS:    Imaging Personally Reviewed:  < from: CT Head No Cont (12.15.17 @ 21:24) >  IMPRESSION:   No acute intracranial hemorrhage, mass effect or evidence of acute   territorial infarct. Mild ventriculomegaly superimposed on cerebral   volume loss.    < end of copied text >      Consultant(s) Notes Reviewed:  Neurology    Care Discussed with Consultants/Other Providers:    Assessment and Plan:

## 2017-12-16 NOTE — H&P ADULT - RS GEN PE MLT RESP DETAILS PC
no intercostal retractions/airway patent/breath sounds equal/good air movement/clear to auscultation bilaterally/respirations non-labored/no chest wall tenderness/no rhonchi/no subcutaneous emphysema/no rales/no wheezes

## 2017-12-16 NOTE — H&P ADULT - HISTORY OF PRESENT ILLNESS
Yoel historian, unable to provide any information to the events that brought him to the Ed.     77M, with unknow past medical history, outside shoveling snow of random houses for 2 hours, a concerned neighbor called 911. EMS arrived and the pt found to be soaking wet, The pt was taken to the Ed. in the Ed, the patient states he was shoveling snow at his house and does not know why he is at the hospital. While in the ED, he was noted to have had 2 undescribed seizures and treated with 2mg Ativan IV.   The pt had a CT Head that is preliminary clear of acute findings, seen by neurology. Their consult and recommendations are in the chart.  The pt denies HA, LOC, falls, dizziness, diaphoresis, chills, chest pain, palpitations, nausea, vomit, abdominal pain, pain to any place on his body. The pt is AA O x 1 to person, . He believes he is currently home in his house. Asking where his house is, the pt only say [place], [place].  Offers no complaints and does not answer all questions when asked.  Currently no active seizures. Called emergency contact listed, Marely Benitez @ [phone].  There was no response and no answering service. Yoel historian, unable to provide any information to the events that brought him to the Ed.     77M, with unknow past medical history, outside shoveling snow of random houses for 2 hours, a concerned neighbor called 911. EMS arrived and the pt found to be soaking wet, The pt was taken to the Ed. in the Ed, the patient states he was shoveling snow at his house and does not know why he is at the hospital. While in the ED, he was noted to have had 2 undescribed seizures and treated with 2mg Ativan IV.   The pt had a CT Head that is preliminary clear of acute findings, seen by neurology. Their consult and recommendations are in the chart.  The pt denies HA, LOC, falls, dizziness, diaphoresis, chills, chest pain, palpitations, nausea, vomit, abdominal pain, pain to any place on his body. The pt is AA O x 1 to person, . He believes he is currently home in his house. Asking where his house is, the pt only say [place], [place].  Offers no complaints and does not answer all questions when asked.  Currently no active seizures.     Called emergency contact listed, Marely Benitez (step-daughter) @ [phone].  There was no response and no answering service.  ED later documents that Ms Benitez called back - volunteered information that patient has history of dementia only, lives alone with her checking up on him occasionally.  Also, she noted that he is unable to care for himself adequately and needs nursing home placement.    Vital signs in ED as follows: BP = 136/81, HR = 60, RR = 16, T = 36.9 C (98.4 F), O2 Sat = 99% on RA. Yoel historian, unable to provide any information to the events that brought him to the ED.     77M, with unknow past medical history, outside shoveling snow of random houses for 2 hours, a concerned neighbor called 911. EMS arrived and the pt found to be soaking wet, The pt was taken to the Ed. in the Ed, the patient states he was shoveling snow at his house and does not know why he is at the hospital. While in the ED, he was noted to have had 2 undescribed seizures and treated with 2mg Ativan IV.   The pt had a CT Head that is preliminary clear of acute findings, seen by neurology. Their consult and recommendations are in the chart.  The pt denies HA, LOC, falls, dizziness, diaphoresis, chills, chest pain, palpitations, nausea, vomit, abdominal pain, pain to any place on his body. The pt is AA O x 1 to person, . He believes he is currently home in his house. Asking where his house is, the pt only say [place], [place].  Offers no complaints and does not answer all questions when asked.  Currently no active seizures.     Called emergency contact listed, Marely Benitez (step-daughter) @ [phone].  There was no response and no answering service.  ED later documents that Ms Benitez called back - volunteered information that patient has history of dementia only, lives alone with her checking up on him occasionally.  Also, she noted that he is unable to care for himself adequately and needs nursing home placement.    Vital signs in ED as follows: BP = 136/81, HR = 60, RR = 16, T = 36.9 C (98.4 F), O2 Sat = 99% on RA.

## 2017-12-16 NOTE — PROGRESS NOTE ADULT - PROBLEM SELECTOR PLAN 4
-Likely demargination in the setting of stress from seizure, no obvious s/s of infection at this time

## 2017-12-16 NOTE — H&P ADULT - PROBLEM SELECTOR PLAN 3
Supplemented.  F/U K+ Supplemented.  F/U K+  F/U Mg level, called #7672 and had it added on. Value = 2.8 - in the setting of renal insufficiency (unknown if old or new)  Supplemented.  F/U K+ level  F/U Mg level, called #5141 and had it added on. (value returned = 2)

## 2017-12-16 NOTE — H&P ADULT - CONSTITUTIONAL
Outpatient History and Physical    Curtis Rodriguez  6/6/2017    Referring Physician: Sohail Kern MD     Planned Procedure: COLONOSCOPY-surveillance    History: 60 year old male , referred by Dr. Sohail Coy for open access surveillance colonoscopy due to a personal history of adenomatous colon polyps and a positive family history of colon cancer. Patient also has pancolonic diverticulosis noted. Patient currently denies any abdominal pain, change of bowel habits, pencil thin stools, blood or mucus in stools, appetite change or weight loss. It was recommended he undergo open access colonoscopy at this time.    Social History   Substance Use Topics   • Smoking status: Never Smoker   • Smokeless tobacco: Never Used   • Alcohol use None        ALLERGIES: no known allergies.     Past Medical History:   Diagnosis Date   • Abnormal LFTs     hx of minimally abnormal LFT, attributed to fatty infiltration of the liver - enzymes noralized since cessation of statin agent   • Clavicle fracture 08/01/2005   • Essential hypertension, benign    • Hyperlipemia    • Multiple rib fractures 08/01/2005   • Traumatic hemothorax 08/01/2005       Past Surgical History:   Procedure Laterality Date   • COLONOSCOPY DIAGNOSTIC  05/23/2012       No outpatient prescriptions have been marked as taking for the 6/7/17 encounter (Hospital Encounter).       Social History     Social History   • Marital status:      Spouse name: N/A   • Number of children: N/A   • Years of education: N/A     Occupational History   • Not on file.     Social History Main Topics   • Smoking status: Never Smoker   • Smokeless tobacco: Never Used   • Alcohol use Not on file   • Drug use: Not on file   • Sexual activity: Not on file     Other Topics Concern   • Not on file     Social History Narrative       History reviewed. No pertinent family history.  Review of systems a complete 12 point review systems was performed and is negative other than that  listed above.  Physical Examination:  There were no vitals taken for this visit.  General physical exam reveals an alert 60 year old  White  male who is alert and oriented times 3 and memory details are intact.  HEENT: No palpable, cervical, supraclavicular, axillary or groin adenopathy is noted. Pupils equal, round and reactive to light. Extraocular movements are intact. Oropharynx is clear. Sclerae anicteric. No thyroid nodules or carotid bruits are noted.   CHEST: Clear to auscultation and percussion bilaterally. No rales, rhonchi, wheezes or rubs are noted.   CARDIOVASCULAR: Regular rhythm with no murmurs heard. No gallop, jugular venous distention, ectopics rub or precordial heave was noted.   ABDOMEN:  Positive bowel sounds without bruits, masses, tenderness, distention, and ascites fluid wave or hepatosplenomegaly. No groin or femoral hernias are noted.  RECTAL EXAM:  Will be performed at the time of colonoscopy..   EXTREMITIES:  No clubbing, edema or cyanosis is noted.   NEUROLOGICAL EXAM: Cranial nerves II through XII are intact and no focal deficits are noted.     No visits with results within 3 Week(s) from this visit.  Latest known visit with results is:    Lab Services on 04/25/2017   Component Date Value Ref Range Status   • FREE PSA 04/25/2017 0.88  ng/mL Final   • PSA, Total 04/25/2017 5.03* <4.01 ng/mL Final   • % FREE PSA 04/25/2017 17.48  % Final          Impression:    #1 personal history of adenomatous colon polyps and positive family history of colon cancer in need of surveillance colonoscopy.    Plan:    #1 schedule flexible open access surveillance colonoscopy after 4 Dulcolax tab and 1 gallon of split GoLYTELY bowel prep on the [facility] under propofol anesthesia. Risks benefits alternatives risk of bleeding and perforation were all discussed with the patient and he wishes to proceed at this time.      Paul Oscar MD  6/6/2017 4:50 PM       detailed exam

## 2017-12-16 NOTE — PROGRESS NOTE ADULT - SUBJECTIVE AND OBJECTIVE BOX
Neurology Progress    DELGADO BENITEZ77yMale    HPI:  Poor historian, unable to provide any information to the events that brought him to the ED.     77M, with unknow past medical history, outside shoveling snow of random houses for 2 hours, a concerned neighbor called 911. EMS arrived and the pt found to be soaking wet, The pt was taken to the Ed. in the Ed, the patient states he was shoveling snow at his house and does not know why he is at the hospital. While in the ED, he was noted to have had 2 undescribed seizures and treated with 2mg Ativan IV.   The pt had a CT Head that is preliminary clear of acute findings, seen by neurology. Their consult and recommendations are in the chart.  The pt denies HA, LOC, falls, dizziness, diaphoresis, chills, chest pain, palpitations, nausea, vomit, abdominal pain, pain to any place on his body. The pt is AA O x 1 to person, . He believes he is currently home in his house. Asking where his house is, the pt only say [place], [place].  Offers no complaints and does not answer all questions when asked.  Currently no active seizures.     Called emergency contact listed, Marely Benitez (step-daughter) @ [phone].  There was no response and no answering service.  ED later documents that Ms Benitez called back - volunteered information that patient has history of dementia only, lives alone with her checking up on him occasionally.  Also, she noted that he is unable to care for himself adequately and needs nursing home placement.    Vital signs in ED as follows: BP = 136/81, HR = 60, RR = 16, T = 36.9 C (98.4 F), O2 Sat = 99% on RA. (16 Dec 2017 01:26)      Past Medical History  Dementia without behavioral disturbance, unspecified dementia type  No pertinent past medical history      Past Surgical History  No significant past surgical history      MEDICATIONS    heparin  Injectable 5000 Unit(s) SubCutaneous every 12 hours  levETIRAcetam  IVPB 500 milliGRAM(s) IV Intermittent every 12 hours  multivitamin 1 Tablet(s) Oral daily  sodium chloride 0.9% lock flush 3 milliLiter(s) IV Push every 8 hours         Family history: No history of dementia, strokes, or seizures   FAMILY HISTORY:  No pertinent family history in first degree relatives    SOCIAL HISTORY -- No history of tobacco or alcohol use     Allergies    No Known Allergies    Intolerances        Height (cm): 170.18 ( @ 01:26)  Weight (kg): 68 ( @ 01:26)  BMI (kg/m2): 23.5 ( @ 01:26)    Vital Signs Last 24 Hrs  T(C): 37.2 (16 Dec 2017 21:18), Max: 37.2 (16 Dec 2017 21:18)  T(F): 99 (16 Dec 2017 21:18), Max: 99 (16 Dec 2017 21:18)  HR: 66 (16 Dec 2017 21:18) (66 - 71)  BP: 163/82 (16 Dec 2017 21:18) (153/85 - 172/86)  BP(mean): --  RR: 18 (16 Dec 2017 21:18) (16 - 18)  SpO2: 100% (16 Dec 2017 21:18) (96% - 100%)        On Neurological Examination:    Mental Status - Patient is alert, awake, oriented X3. .   Follows commands well and able to answer questions appropriately. Mood and affect  normal  Follow simple commands able to repeat  able to name.  Speech - Fluent no Dysarthria  no  Aphasia                              Cranial Nerves - Extraocular muscle intact  MICK Facial symmetry Tongue midline, CnV1to V3 intact gross hearing intact      Motor Exam -   Right upper  5/5 throughout  Left upper 5/5 throughtout  Right lower- 5/5 throughout  Left lower 5/5 throughout  Coordination -finger to nose intact  Muscle tone - is normal all over. No asymmetry is seen.      Sensory    Bilateral intact to light touch    Gait -  normal  no ataxia     GENERAL Exam:     Nontoxic , No Acute Distress   	  HEENT:  normocephalic, atraumatic  		  LUNGS:	Clear bilaterally  No Wheeze      VASCULAR: no carotid brui  	  HEART:	 Normal S1S2   No murmur RRR        	  MUSCULOSKELETAL: Normal Range of Motion  	   SKIN:      Normal   No Ecchymosis               LABS:  CBC Full  -  ( 16 Dec 2017 02:40 )  WBC Count : 10.58 K/uL  Hemoglobin : 12.2 g/dL  Hematocrit : 38.3 %  Platelet Count - Automated : 252 K/uL  Mean Cell Volume : 90.8 fL  Mean Cell Hemoglobin : 28.9 pg  Mean Cell Hemoglobin Concentration : 31.9 %  Auto Neutrophil # : x  Auto Lymphocyte # : x  Auto Monocyte # : x  Auto Eosinophil # : x  Auto Basophil # : x  Auto Neutrophil % : x  Auto Lymphocyte % : x  Auto Monocyte % : x  Auto Eosinophil % : x  Auto Basophil % : x    Urinalysis Basic - ( 15 Dec 2017 19:55 )    Color: COLORL / Appearance: CLEAR / S.007 / pH: 6.0  Gluc: NEGATIVE / Ketone: NEGATIVE  / Bili: NEGATIVE / Urobili: NORMAL mg/dL   Blood: NEGATIVE / Protein: 10 mg/dL / Nitrite: NEGATIVE   Leuk Esterase: NEGATIVE / RBC: 0-2 / WBC 0-2   Sq Epi: OCC / Non Sq Epi: x / Bacteria: FEW          143  |  105  |  11  ----------------------------<  70  3.4<L>   |  23  |  1.13    Ca    8.4      16 Dec 2017 05:32  Mg     2.6         TPro  7.0  /  Alb  3.3  /  TBili  0.5  /  DBili  x   /  AST  23  /  ALT  11  /  AlkPhos  137<H>      Hemoglobin A1C: Hemoglobin A1C, Whole Blood: 5.5 % ( @ 02:40)    Lipid Panel  @ 02:40  Total Cholesterol, Serum 170  LDL 88  Triglycerides 41    LIVER FUNCTIONS - ( 16 Dec 2017 05:32 )  Alb: 3.3 g/dL / Pro: 7.0 g/dL / ALK PHOS: 137 u/L / ALT: 11 u/L / AST: 23 u/L / GGT: x           Vitamin B12         RADIOLOGY    < from: CT Head No Cont (12.15.17 @ 21:24) >    EXAM:  CT BRAIN        PROCEDURE DATE:  Dec 15 2017         INTERPRETATION:  CLINICAL INFORMATION: Altered mental status    Technique: Noncontrast CT of the head was performed.    Multiple contiguous axial images were acquired from the skullbase tothe   vertex without the administration of intravenous contrast. Sagittal and   coronal reformats were also submitted for review.    COMPARISON: No prior imaging available for comparison.    FINDINGS:  There is no acute intracranial hemorrhage, mass effect or evidence of   acute territorial infarct. Patchy areas of low-attenuation are seen the   bihemispheric white matter, nonspecific, but likely scalloped chronic   microvascular change.    There is mild disproportionate enlargement of the lateral ventricles as   compared to the degree of sulcal enlargement and variation suggesting   mild ventriculomegaly cerebral volume loss. There is no midline shift.   There are no abnormal extra-axial fluid collections.    The calvarium is intact. The visualized paranasal sinuses and mastoid   complexes appear free of acute disease. There is a high right parietal   scalp lesion likely a sebaceous cyst.    IMPRESSION:   No acute intracranial hemorrhage, mass effect or evidence of acute   territorial infarct. Mild ventriculomegaly superimposed on cerebral   volume loss.              OTONIEL ALVAREZ M.D., RADIOLOGY RESIDENT  This document has been electronically signed.  KALLIE LUNA M.D., RADIOLOGIST  This document has been electronically signed. Dec 15 2017 10:06PM        < end of copied text >            schoenberg

## 2017-12-16 NOTE — H&P ADULT - NSHPLABSRESULTS_GEN_ALL_CORE
NSR  @ 66b/ min  CE negative x 1  UA clean   Urine tox = clean  CT Head=No acute intracranial hemorrhage, mass effect or evidence of acute territorial infarct. Mild ventriculomegaly superimposed on cerebral volume loss.  CXR preliminary clear   No Keppra 500 mg IV BID pending dysphagia screen NSR  @ 66b/ min, QT/ QTC= 404/ 477  CE negative x 1  UA clean   Urine tox = clean,  CT Head=No acute intracranial hemorrhage, mass effect or evidence of acute territorial infarct. Mild ventriculomegaly superimposed on cerebral volume loss.  CXR preliminary clear.   Keppra 500 mg IV BID pending dysphagia screen.

## 2017-12-17 DIAGNOSIS — I10 ESSENTIAL (PRIMARY) HYPERTENSION: ICD-10-CM

## 2017-12-17 LAB
BASOPHILS # BLD AUTO: 0.06 K/UL — SIGNIFICANT CHANGE UP (ref 0–0.2)
BASOPHILS NFR BLD AUTO: 0.6 % — SIGNIFICANT CHANGE UP (ref 0–2)
BUN SERPL-MCNC: 10 MG/DL — SIGNIFICANT CHANGE UP (ref 7–23)
CALCIUM SERPL-MCNC: 8.9 MG/DL — SIGNIFICANT CHANGE UP (ref 8.4–10.5)
CHLORIDE SERPL-SCNC: 99 MMOL/L — SIGNIFICANT CHANGE UP (ref 98–107)
CO2 SERPL-SCNC: 18 MMOL/L — LOW (ref 22–31)
CREAT SERPL-MCNC: 1.16 MG/DL — SIGNIFICANT CHANGE UP (ref 0.5–1.3)
EOSINOPHIL # BLD AUTO: 0.05 K/UL — SIGNIFICANT CHANGE UP (ref 0–0.5)
EOSINOPHIL NFR BLD AUTO: 0.5 % — SIGNIFICANT CHANGE UP (ref 0–6)
GLUCOSE SERPL-MCNC: 55 MG/DL — LOW (ref 70–99)
HCT VFR BLD CALC: 42.9 % — SIGNIFICANT CHANGE UP (ref 39–50)
HGB BLD-MCNC: 13.7 G/DL — SIGNIFICANT CHANGE UP (ref 13–17)
IMM GRANULOCYTES # BLD AUTO: 0.03 # — SIGNIFICANT CHANGE UP
IMM GRANULOCYTES NFR BLD AUTO: 0.3 % — SIGNIFICANT CHANGE UP (ref 0–1.5)
LYMPHOCYTES # BLD AUTO: 1.07 K/UL — SIGNIFICANT CHANGE UP (ref 1–3.3)
LYMPHOCYTES # BLD AUTO: 10.1 % — LOW (ref 13–44)
MAGNESIUM SERPL-MCNC: 2.1 MG/DL — SIGNIFICANT CHANGE UP (ref 1.6–2.6)
MCHC RBC-ENTMCNC: 28.9 PG — SIGNIFICANT CHANGE UP (ref 27–34)
MCHC RBC-ENTMCNC: 31.9 % — LOW (ref 32–36)
MCV RBC AUTO: 90.5 FL — SIGNIFICANT CHANGE UP (ref 80–100)
MONOCYTES # BLD AUTO: 0.61 K/UL — SIGNIFICANT CHANGE UP (ref 0–0.9)
MONOCYTES NFR BLD AUTO: 5.8 % — SIGNIFICANT CHANGE UP (ref 2–14)
NEUTROPHILS # BLD AUTO: 8.77 K/UL — HIGH (ref 1.8–7.4)
NEUTROPHILS NFR BLD AUTO: 82.7 % — HIGH (ref 43–77)
NRBC # FLD: 0 — SIGNIFICANT CHANGE UP
PHOSPHATE SERPL-MCNC: 2.8 MG/DL — SIGNIFICANT CHANGE UP (ref 2.5–4.5)
PLATELET # BLD AUTO: 297 K/UL — SIGNIFICANT CHANGE UP (ref 150–400)
PMV BLD: 9.8 FL — SIGNIFICANT CHANGE UP (ref 7–13)
POTASSIUM SERPL-MCNC: 3.8 MMOL/L — SIGNIFICANT CHANGE UP (ref 3.5–5.3)
POTASSIUM SERPL-SCNC: 3.8 MMOL/L — SIGNIFICANT CHANGE UP (ref 3.5–5.3)
RBC # BLD: 4.74 M/UL — SIGNIFICANT CHANGE UP (ref 4.2–5.8)
RBC # FLD: 14.2 % — SIGNIFICANT CHANGE UP (ref 10.3–14.5)
SODIUM SERPL-SCNC: 140 MMOL/L — SIGNIFICANT CHANGE UP (ref 135–145)
WBC # BLD: 10.59 K/UL — HIGH (ref 3.8–10.5)
WBC # FLD AUTO: 10.59 K/UL — HIGH (ref 3.8–10.5)

## 2017-12-17 PROCEDURE — 95819 EEG AWAKE AND ASLEEP: CPT | Mod: 26

## 2017-12-17 PROCEDURE — 99233 SBSQ HOSP IP/OBS HIGH 50: CPT

## 2017-12-17 RX ORDER — LEVETIRACETAM 250 MG/1
500 TABLET, FILM COATED ORAL
Qty: 0 | Refills: 0 | Status: DISCONTINUED | OUTPATIENT
Start: 2017-12-17 | End: 2017-12-18

## 2017-12-17 RX ORDER — AMLODIPINE BESYLATE 2.5 MG/1
5 TABLET ORAL DAILY
Qty: 0 | Refills: 0 | Status: DISCONTINUED | OUTPATIENT
Start: 2017-12-18 | End: 2017-12-22

## 2017-12-17 RX ORDER — AMLODIPINE BESYLATE 2.5 MG/1
5 TABLET ORAL ONCE
Qty: 0 | Refills: 0 | Status: COMPLETED | OUTPATIENT
Start: 2017-12-17 | End: 2017-12-17

## 2017-12-17 RX ADMIN — AMLODIPINE BESYLATE 5 MILLIGRAM(S): 2.5 TABLET ORAL at 11:49

## 2017-12-17 RX ADMIN — LEVETIRACETAM 400 MILLIGRAM(S): 250 TABLET, FILM COATED ORAL at 05:50

## 2017-12-17 RX ADMIN — Medication 1 TABLET(S): at 11:49

## 2017-12-17 RX ADMIN — HEPARIN SODIUM 5000 UNIT(S): 5000 INJECTION INTRAVENOUS; SUBCUTANEOUS at 18:04

## 2017-12-17 RX ADMIN — LEVETIRACETAM 500 MILLIGRAM(S): 250 TABLET, FILM COATED ORAL at 18:04

## 2017-12-17 RX ADMIN — HEPARIN SODIUM 5000 UNIT(S): 5000 INJECTION INTRAVENOUS; SUBCUTANEOUS at 05:50

## 2017-12-17 RX ADMIN — SODIUM CHLORIDE 3 MILLILITER(S): 9 INJECTION INTRAMUSCULAR; INTRAVENOUS; SUBCUTANEOUS at 05:56

## 2017-12-17 RX ADMIN — SODIUM CHLORIDE 3 MILLILITER(S): 9 INJECTION INTRAMUSCULAR; INTRAVENOUS; SUBCUTANEOUS at 20:41

## 2017-12-17 RX ADMIN — SODIUM CHLORIDE 3 MILLILITER(S): 9 INJECTION INTRAMUSCULAR; INTRAVENOUS; SUBCUTANEOUS at 15:13

## 2017-12-17 NOTE — DIETITIAN INITIAL EVALUATION ADULT. - PROBLEM SELECTOR PLAN 2
F/u Renal sono.  F/U BMP, CPK level (in the setting of 2 seizure activities)  S/P 1 liter Ns in ED; continuing w/ IVF with NS @ 50ml/ hour

## 2017-12-17 NOTE — PROGRESS NOTE ADULT - PROBLEM SELECTOR PLAN 1
-Patient tolerating po, will change to Keppra 500mg po BID  -Follow-up EEG  -Neuro recs appreciated, MRI with nohemy recommendation noted, but in light of fact that patient cannot consent for himself and unable to reach family, will need to hold off for now  -No events on tele, will dc tele today  -PT evaluation

## 2017-12-17 NOTE — EEG REPORT - NS EEG TEXT BOX
[facility]  Report of Routine EEG with Video    [facility]: [address] [place] [address], Phone: [phone]  [facility]: [phone] [place] [address], Phone: [phone]  Office: [address] [facility], [facility] [address], [place] [address], Phone: [phone]    Patient Name: DELGADO SUE    Age: 77 y  : 1940  Patient ID: -, MRN #: -, Location: 406 C  Referring Physician: -    EEG #: [id number]  Study Date: 2017		    Technical Information:					  On Instrument: -  Placement and Labeling of Electrodes:  The EEG was performed utilizing 20 channels referential EEG connections (coronal over temporal over parasagittal montage) using all standard 10-20 electrode placements with EKG.  Recording was at a sampling rate of 256 samples per second per channel.  Time synchronized digital video recording was done simultaneously with EEG recording.  A low light infrared camera was used for low light recording.  Nils and seizure detection algorithms were utilized.    History:  76YO WITH AMS      Medication	  No Data.	    Study Interpretation:    FINDINGS:  The background was continuous, spontaneously variable and reactive.  During wakefulness, there were fragments of an 8 Hz posterior dominant rhythm, that was not well seen on the right.  Low amplitude frontal beta was noted in wakefulness.    Background Slowing:  Generalized slowing: theta activity was present diffusely.  Focal slowing: continuous irregular delta was present in the right hemisphere, greatest in the right frontotemporal region.    Sleep Background:  Drowsiness and stage II sleep transients were not recorded.    Other Paroxysmal Activity:  None     Interictal Epileptiform Activity:   There are occasional sharp waves in the left anterior temporal region.    Ictal Epileptiform Activity or Events:  No clinical events were recorded.  No seizures were recorded.    Activation Procedures:   -Hyperventilation was not performed.    -Photic stimulation was not performed.    Artifacts:  Intermittent myogenic and movement artifacts were noted.    ECG:  The heart rate on single channel ECG was predominantly between 60-70 BPM.    EEG Classification / Summary:  Abnormal  EEG in the awake state.  1.	Sharp waves, focal, right anterior temporal   2.	Continuous irregular delta slowing, focal, right hemisphere, max right frontotemporal region  3.	Asymmetry, loss of posterior dominant rhythm on right    Clinical Impression:  There is focal dysfunction in the right anterior temporal region that is potentially epileptogenic. These findings also suggest a possible structural abnormality in the right hemisphere, though postictal slowing is also a consideration. A repeat EEG in 24-48 hour may be helpful in evaluating persistence of right hemisphere changes. There are no electrographic seizures seen.       Cesar Matamoros MD PhD  Director, Epilepsy Division, [facility]

## 2017-12-17 NOTE — PROGRESS NOTE ADULT - SUBJECTIVE AND OBJECTIVE BOX
Patient is a 77y old  Male who presents with a chief complaint of Seizure x 1 day (16 Dec 2017 01:26)      SUBJECTIVE / OVERNIGHT EVENTS: No acute event overnight. No tele events. Ate some Cheerios this AM per RN. Pt wo complaints.    MEDICATIONS  (STANDING):  heparin  Injectable 5000 Unit(s) SubCutaneous every 12 hours  levETIRAcetam 500 milliGRAM(s) Oral two times a day  multivitamin 1 Tablet(s) Oral daily  sodium chloride 0.9% lock flush 3 milliLiter(s) IV Push every 8 hours    MEDICATIONS  (PRN):      Vital Signs Last 24 Hrs  T(C): 37.1 (17 @ 11:41)  T(F): 98.8 (17 @ 11:41), Max: 99 (17 @ 21:18)  HR: 65 (17 @ 11:41) (65 - 66)  BP: 133/70 (17 @ 11:41)  BP(mean): --  RR: 17 (17 @ 11:41) (17 - 18)  SpO2: 97% (17 @ 11:41) (97% - 100%)  Wt(kg): --    CAPILLARY BLOOD GLUCOSE      POCT Blood Glucose.: 108 mg/dL (17 Dec 2017 12:17)  POCT Blood Glucose.: 151 mg/dL (17 Dec 2017 09:03)    I&O's Summary      PHYSICAL EXAM:  GENERAL: NAD, well-developed  HEAD:  Atraumatic, Normocephalic  EYES: EOMI, PERRLA, conjunctiva and sclera clear  NECK: Supple, No JVD  CHEST/LUNG: Clear to auscultation bilaterally; No wheeze  HEART: Regular rate and rhythm; No murmurs, rubs, or gallops  ABDOMEN: Soft, Nontender, Nondistended; Bowel sounds present  EXTREMITIES:  2+ Peripheral Pulses, No clubbing, cyanosis, or edema  PSYCH: AAOx3  NEUROLOGY: non-focal  SKIN: No rashes or lesions    LABS:                        13.7   10.59 )-----------( 297      ( 17 Dec 2017 05:57 )             42.9         140  |  99  |  10  ----------------------------<  55<L>  3.8   |  18<L>  |  1.16    Ca    8.9      17 Dec 2017 05:57  Phos  2.8     12-17  Mg     2.1     12-17    TPro  7.0  /  Alb  3.3  /  TBili  0.5  /  DBili  x   /  AST  23  /  ALT  11  /  AlkPhos  137<H>  12-16      CARDIAC MARKERS ( 16 Dec 2017 05:32 )  x     / < 0.06 ng/mL / 170 u/L / 3.98 ng/mL / x      CARDIAC MARKERS ( 15 Dec 2017 19:40 )  x     / < 0.06 ng/mL / 174 u/L / 4.45 ng/mL / x          Urinalysis Basic - ( 15 Dec 2017 19:55 )    Color: COLORL / Appearance: CLEAR / S.007 / pH: 6.0  Gluc: NEGATIVE / Ketone: NEGATIVE  / Bili: NEGATIVE / Urobili: NORMAL mg/dL   Blood: NEGATIVE / Protein: 10 mg/dL / Nitrite: NEGATIVE   Leuk Esterase: NEGATIVE / RBC: 0-2 / WBC 0-2   Sq Epi: OCC / Non Sq Epi: x / Bacteria: FEW        RADIOLOGY & ADDITIONAL TESTS:    Imaging Personally Reviewed:    Consultant(s) Notes Reviewed:  Neurology    Care Discussed with Consultants/Other Providers:    Assessment and Plan: Patient is a 77y old  Male who presents with a chief complaint of Seizure x 1 day (16 Dec 2017 01:26)      SUBJECTIVE / OVERNIGHT EVENTS: No acute event overnight. No tele events. Ate some Cheerios this AM per RN. Pt wo complaints.    MEDICATIONS  (STANDING):  heparin  Injectable 5000 Unit(s) SubCutaneous every 12 hours  levETIRAcetam 500 milliGRAM(s) Oral two times a day  multivitamin 1 Tablet(s) Oral daily  sodium chloride 0.9% lock flush 3 milliLiter(s) IV Push every 8 hours    MEDICATIONS  (PRN):      Vital Signs Last 24 Hrs  T(C): 37.1 (17 @ 11:41)  T(F): 98.8 (17 @ 11:41), Max: 99 (17 @ 21:18)  HR: 65 (17 @ 11:41) (65 - 66)  BP: 133/70 (17 @ 11:41)  BP(mean): --  RR: 17 (17 @ 11:41) (17 - 18)  SpO2: 97% (17 @ 11:41) (97% - 100%)  Wt(kg): --    CAPILLARY BLOOD GLUCOSE      POCT Blood Glucose.: 108 mg/dL (17 Dec 2017 12:17)  POCT Blood Glucose.: 151 mg/dL (17 Dec 2017 09:03)    I&O's Summary    PHYSICAL EXAM  GENERAL: NAD, well-developed, pulling on towel  HEAD:  Atraumatic, Normocephalic  EYES: EOMI, PERRLA, conjunctiva and sclera clear  NECK: Supple, No JVD  CHEST/LUNG: Clear to auscultation bilaterally; No wheeze  HEART: Regular rate and rhythm; No murmurs, rubs, or gallops  ABDOMEN: Soft, Nontender, Nondistended; Bowel sounds present  EXTREMITIES:  2+ Peripheral Pulses, No clubbing, cyanosis, or edema  PSYCH: AAOx1-2 (knows he is in a hospital but not which or where it is, does not know date), pulling on towel doesn't answer all questions  NEUROLOGY: non-focal  SKIN: +old lesion on scalp, LLE abrasions    LABS:                        13.7   10.59 )-----------( 297      ( 17 Dec 2017 05:57 )             42.9     12-17    140  |  99  |  10  ----------------------------<  55<L>  3.8   |  18<L>  |  1.16    Ca    8.9      17 Dec 2017 05:57  Phos  2.8     12-  Mg     2.1     12-    TPro  7.0  /  Alb  3.3  /  TBili  0.5  /  DBili  x   /  AST  23  /  ALT  11  /  AlkPhos  137<H>  12-16      CARDIAC MARKERS ( 16 Dec 2017 05:32 )  x     / < 0.06 ng/mL / 170 u/L / 3.98 ng/mL / x      CARDIAC MARKERS ( 15 Dec 2017 19:40 )  x     / < 0.06 ng/mL / 174 u/L / 4.45 ng/mL / x          Urinalysis Basic - ( 15 Dec 2017 19:55 )    Color: COLORL / Appearance: CLEAR / S.007 / pH: 6.0  Gluc: NEGATIVE / Ketone: NEGATIVE  / Bili: NEGATIVE / Urobili: NORMAL mg/dL   Blood: NEGATIVE / Protein: 10 mg/dL / Nitrite: NEGATIVE   Leuk Esterase: NEGATIVE / RBC: 0-2 / WBC 0-2   Sq Epi: OCC / Non Sq Epi: x / Bacteria: FEW        RADIOLOGY & ADDITIONAL TESTS:    Imaging Personally Reviewed:    Consultant(s) Notes Reviewed:  Neurology    Care Discussed with Consultants/Other Providers:    Assessment and Plan:

## 2017-12-17 NOTE — DIETITIAN INITIAL EVALUATION ADULT. - NS AS NUTRI INTERV ED CONTENT
Purpose of the nutrition education/Please Encourage po intake, assist with meals and menu selections, provide alternatives PRN./Priority modifications/Survival information

## 2017-12-17 NOTE — PROGRESS NOTE ADULT - SUBJECTIVE AND OBJECTIVE BOX
angélica Note:   · Provider Specialty	Neurology	      · Subjective and Objective: 	  Neurology Progress    DELGADO BENITEZ77yMale    HPI:  Poor historian, unable to provide any information to the events that brought him to the ED.     77M, with unknow past medical history, outside shoveling snow of random houses for 2 hours, a concerned neighbor called 911. EMS arrived and the pt found to be soaking wet, The pt was taken to the Ed. in the Ed, the patient states he was shoveling snow at his house and does not know why he is at the hospital. While in the ED, he was noted to have had 2 undescribed seizures and treated with 2mg Ativan IV.   The pt had a CT Head that is preliminary clear of acute findings, seen by neurology. Their consult and recommendations are in the chart.  The pt denies HA, LOC, falls, dizziness, diaphoresis, chills, chest pain, palpitations, nausea, vomit, abdominal pain, pain to any place on his body. The pt is AA O x 1 to person, . He believes he is currently home in his house. Asking where his house is, the pt only say [place], [place].  Offers no complaints and does not answer all questions when asked.  Currently no active seizures.     Called emergency contact listed, Marely Benitez (step-daughter) @ [phone].  There was no response and no answering service.  ED later documents that Ms Benitez called back - volunteered information that patient has history of dementia only, lives alone with her checking up on him occasionally.  Also, she noted that he is unable to care for himself adequately and needs nursing home placement.    Vital signs in ED as follows: BP = 136/81, HR = 60, RR = 16, T = 36.9 C (98.4 F), O2 Sat = 99% on RA. (16 Dec 2017 01:26)      Past Medical History  Dementia without behavioral disturbance, unspecified dementia type  No pertinent past medical history      Past Surgical History  No significant past surgical history      MEDICATIONS    heparin  Injectable 5000 Unit(s) SubCutaneous every 12 hours  levETIRAcetam  IVPB 500 milliGRAM(s) IV Intermittent every 12 hours  multivitamin 1 Tablet(s) Oral daily  sodium chloride 0.9% lock flush 3 milliLiter(s) IV Push every 8 hours         Family history: No history of dementia, strokes, or seizures   FAMILY HISTORY:  No pertinent family history in first degree relatives    SOCIAL HISTORY -- No history of tobacco or alcohol use     Allergies    No Known Allergies    Intolerances        Height (cm): 170.18 ( @ 01:26)  Weight (kg): 68 ( @ 01:26)  BMI (kg/m2): 23.5 ( @ 01:26)    Vital Signs Last 24 Hrs   /70  HR 78  RR 18  SpO2: 100% (16 Dec 2017 21:18) (96% - 100%)        On Neurological Examination:    Mental Status - Patient is alert, awake, oriented X3. .   Follows commands well and able to answer questions appropriately. Mood and affect  normal  Follow simple commands able to repeat  able to name.  Speech - Fluent no Dysarthria  no  Aphasia                              Cranial Nerves - Extraocular muscle intact  MICK Facial symmetry Tongue midline, CnV1to V3 intact gross hearing intact      Motor Exam -   Right upper  5/5 throughout  Left upper 5/5 throughtout  Right lower- 5/5 throughout  Left lower 5/5 throughout  Coordination -finger to nose intact  Muscle tone - is normal all over. No asymmetry is seen.      Sensory    Bilateral intact to light touch    Gait -  normal  no ataxia     GENERAL Exam:     Nontoxic , No Acute Distress   	  HEENT:  normocephalic, atraumatic  		  LUNGS:	Clear bilaterally  No Wheeze      VASCULAR: no carotid brui  	  HEART:	 Normal S1S2   No murmur RRR        	  MUSCULOSKELETAL: Normal Range of Motion  	   SKIN:      Normal   No Ecchymosis               LABS:  CBC Full  -  ( 16 Dec 2017 02:40 )  WBC Count : 10.58 K/uL  Hemoglobin : 12.2 g/dL  Hematocrit : 38.3 %  Platelet Count - Automated : 252 K/uL  Mean Cell Volume : 90.8 fL  Mean Cell Hemoglobin : 28.9 pg  Mean Cell Hemoglobin Concentration : 31.9 %  Auto Neutrophil # : x  Auto Lymphocyte # : x  Auto Monocyte # : x  Auto Eosinophil # : x  Auto Basophil # : x  Auto Neutrophil % : x  Auto Lymphocyte % : x  Auto Monocyte % : x  Auto Eosinophil % : x  Auto Basophil % : x    Urinalysis Basic - ( 15 Dec 2017 19:55 )    Color: COLORL / Appearance: CLEAR / S.007 / pH: 6.0  Gluc: NEGATIVE / Ketone: NEGATIVE  / Bili: NEGATIVE / Urobili: NORMAL mg/dL   Blood: NEGATIVE / Protein: 10 mg/dL / Nitrite: NEGATIVE   Leuk Esterase: NEGATIVE / RBC: 0-2 / WBC 0-2   Sq Epi: OCC / Non Sq Epi: x / Bacteria: FEW          143  |  105  |  11  ----------------------------<  70  3.4<L>   |  23  |  1.13    Ca    8.4      16 Dec 2017 05:32  Mg     2.6         TPro  7.0  /  Alb  3.3  /  TBili  0.5  /  DBili  x   /  AST  23  /  ALT  11  /  AlkPhos  137<H>      Hemoglobin A1C: Hemoglobin A1C, Whole Blood: 5.5 % ( @ 02:40)    Lipid Panel  @ 02:40  Total Cholesterol, Serum 170  LDL 88  Triglycerides 41    LIVER FUNCTIONS - ( 16 Dec 2017 05:32 )  Alb: 3.3 g/dL / Pro: 7.0 g/dL / ALK PHOS: 137 u/L / ALT: 11 u/L / AST: 23 u/L / GGT: x           Vitamin B12         RADIOLOGY    < from: CT Head No Cont (12.15.17 @ 21:24) >    EXAM:  CT BRAIN        PROCEDURE DATE:  Dec 15 2017         INTERPRETATION:  CLINICAL INFORMATION: Altered mental status    Technique: Noncontrast CT of the head was performed.    Multiple contiguous axial images were acquired from the skullbase tothe   vertex without the administration of intravenous contrast. Sagittal and   coronal reformats were also submitted for review.    COMPARISON: No prior imaging available for comparison.    FINDINGS:  There is no acute intracranial hemorrhage, mass effect or evidence of   acute territorial infarct. Patchy areas of low-attenuation are seen the   bihemispheric white matter, nonspecific, but likely scalloped chronic   microvascular change.    There is mild disproportionate enlargement of the lateral ventricles as   compared to the degree of sulcal enlargement and variation suggesting   mild ventriculomegaly cerebral volume loss. There is no midline shift.   There are no abnormal extra-axial fluid collections.    The calvarium is intact. The visualized paranasal sinuses and mastoid   complexes appear free of acute disease. There is a high right parietal   scalp lesion likely a sebaceous cyst.    IMPRESSION:   No acute intracranial hemorrhage, mass effect or evidence of acute   territorial infarct. Mild ventriculomegaly superimposed on cerebral   volume loss.              OTONIEL ALVAREZ M.D., RADIOLOGY RESIDENT  This document has been electronically signed.  KALLIE LUNA M.D., RADIOLOGIST  This document has been electronically signed. Dec 15 2017 10:06PM        < end of copied text >            scho[place]     Assessment and Plan:   · Assessment		  This is a male with confusion and seizures     mri with nohemy      EEG     Keppra continue

## 2017-12-17 NOTE — PROGRESS NOTE ADULT - PROBLEM SELECTOR PLAN 2
-Unclear baseline, have attempted to reach family wo success  -Unable to leave VM on listed number and no one answers phone

## 2017-12-17 NOTE — PROGRESS NOTE ADULT - ASSESSMENT
78 yo M with dementia (unclear baseline), p/w episode of obtundation with course c/b seizures x 2 in the ED that resolved with Ativan. Doing well after being started on Keppra. Unable to reach family for collateral after multiple attempts at calling listed number. Unable to leave VM.

## 2017-12-17 NOTE — DIETITIAN INITIAL EVALUATION ADULT. - PROBLEM SELECTOR PLAN 3
Value = 2.8 - in the setting of renal insufficiency (unknown if old or new)  Supplemented.  F/U K+ level  F/U Mg level, called #[id number] and had it added on. (value returned = 2)

## 2017-12-17 NOTE — DIETITIAN INITIAL EVALUATION ADULT. - PERTINENT LABORATORY DATA
12-17 Na140 mmol/L Glu 55 mg/dL<L> K+ 3.8 mmol/L Cr  1.16 mg/dL BUN 10 mg/dL Phos 2.8 mg/dL Alb n/a   PAB n/a

## 2017-12-17 NOTE — DIETITIAN INITIAL EVALUATION ADULT. - PROBLEM SELECTOR PLAN 1
Report of 2 undescribed events in the ED - in patient with no prior history  CM,   Urine and Serum toxicology negative  CT of head without any acute findings  TSH within acceptable range (1.46)  F/U CE, EKG. TTE, EEG, A!C, FLP  Neuro consult appreciated; starting Keppra 500 mg IV BID.  Fall, Aspiration, Safety, Seizure precautions.   F/U Dysphagia screen.  S/P 1 liter NS; will continue with maintenance at 50 mL/Hr x 24 hours

## 2017-12-18 LAB
BUN SERPL-MCNC: 18 MG/DL — SIGNIFICANT CHANGE UP (ref 7–23)
CALCIUM SERPL-MCNC: 9.2 MG/DL — SIGNIFICANT CHANGE UP (ref 8.4–10.5)
CHLORIDE SERPL-SCNC: 100 MMOL/L — SIGNIFICANT CHANGE UP (ref 98–107)
CO2 SERPL-SCNC: 22 MMOL/L — SIGNIFICANT CHANGE UP (ref 22–31)
CREAT SERPL-MCNC: 1.27 MG/DL — SIGNIFICANT CHANGE UP (ref 0.5–1.3)
GLUCOSE SERPL-MCNC: 93 MG/DL — SIGNIFICANT CHANGE UP (ref 70–99)
HCT VFR BLD CALC: 40.1 % — SIGNIFICANT CHANGE UP (ref 39–50)
HGB BLD-MCNC: 13 G/DL — SIGNIFICANT CHANGE UP (ref 13–17)
MCHC RBC-ENTMCNC: 29 PG — SIGNIFICANT CHANGE UP (ref 27–34)
MCHC RBC-ENTMCNC: 32.4 % — SIGNIFICANT CHANGE UP (ref 32–36)
MCV RBC AUTO: 89.5 FL — SIGNIFICANT CHANGE UP (ref 80–100)
NRBC # FLD: 0 — SIGNIFICANT CHANGE UP
PLATELET # BLD AUTO: 350 K/UL — SIGNIFICANT CHANGE UP (ref 150–400)
PMV BLD: 9.2 FL — SIGNIFICANT CHANGE UP (ref 7–13)
POTASSIUM SERPL-MCNC: 4 MMOL/L — SIGNIFICANT CHANGE UP (ref 3.5–5.3)
POTASSIUM SERPL-SCNC: 4 MMOL/L — SIGNIFICANT CHANGE UP (ref 3.5–5.3)
RBC # BLD: 4.48 M/UL — SIGNIFICANT CHANGE UP (ref 4.2–5.8)
RBC # FLD: 14 % — SIGNIFICANT CHANGE UP (ref 10.3–14.5)
SODIUM SERPL-SCNC: 140 MMOL/L — SIGNIFICANT CHANGE UP (ref 135–145)
WBC # BLD: 11.28 K/UL — HIGH (ref 3.8–10.5)
WBC # FLD AUTO: 11.28 K/UL — HIGH (ref 3.8–10.5)

## 2017-12-18 PROCEDURE — 90792 PSYCH DIAG EVAL W/MED SRVCS: CPT

## 2017-12-18 PROCEDURE — 99233 SBSQ HOSP IP/OBS HIGH 50: CPT

## 2017-12-18 RX ORDER — LEVETIRACETAM 250 MG/1
500 TABLET, FILM COATED ORAL ONCE
Qty: 0 | Refills: 0 | Status: COMPLETED | OUTPATIENT
Start: 2017-12-18 | End: 2017-12-18

## 2017-12-18 RX ORDER — OLANZAPINE 15 MG/1
1.25 TABLET, FILM COATED ORAL EVERY 6 HOURS
Qty: 0 | Refills: 0 | Status: DISCONTINUED | OUTPATIENT
Start: 2017-12-18 | End: 2017-12-22

## 2017-12-18 RX ORDER — QUETIAPINE FUMARATE 200 MG/1
25 TABLET, FILM COATED ORAL EVERY 6 HOURS
Qty: 0 | Refills: 0 | Status: DISCONTINUED | OUTPATIENT
Start: 2017-12-18 | End: 2017-12-22

## 2017-12-18 RX ORDER — LEVETIRACETAM 250 MG/1
750 TABLET, FILM COATED ORAL
Qty: 0 | Refills: 0 | Status: DISCONTINUED | OUTPATIENT
Start: 2017-12-19 | End: 2017-12-19

## 2017-12-18 RX ORDER — QUETIAPINE FUMARATE 200 MG/1
25 TABLET, FILM COATED ORAL AT BEDTIME
Qty: 0 | Refills: 0 | Status: DISCONTINUED | OUTPATIENT
Start: 2017-12-18 | End: 2017-12-22

## 2017-12-18 RX ADMIN — LEVETIRACETAM 400 MILLIGRAM(S): 250 TABLET, FILM COATED ORAL at 22:53

## 2017-12-18 RX ADMIN — SODIUM CHLORIDE 3 MILLILITER(S): 9 INJECTION INTRAMUSCULAR; INTRAVENOUS; SUBCUTANEOUS at 21:13

## 2017-12-18 RX ADMIN — SODIUM CHLORIDE 3 MILLILITER(S): 9 INJECTION INTRAMUSCULAR; INTRAVENOUS; SUBCUTANEOUS at 05:42

## 2017-12-18 RX ADMIN — HEPARIN SODIUM 5000 UNIT(S): 5000 INJECTION INTRAVENOUS; SUBCUTANEOUS at 05:41

## 2017-12-18 RX ADMIN — SODIUM CHLORIDE 3 MILLILITER(S): 9 INJECTION INTRAMUSCULAR; INTRAVENOUS; SUBCUTANEOUS at 14:34

## 2017-12-18 RX ADMIN — AMLODIPINE BESYLATE 5 MILLIGRAM(S): 2.5 TABLET ORAL at 05:41

## 2017-12-18 RX ADMIN — Medication 1 MILLIGRAM(S): at 22:40

## 2017-12-18 RX ADMIN — HEPARIN SODIUM 5000 UNIT(S): 5000 INJECTION INTRAVENOUS; SUBCUTANEOUS at 17:56

## 2017-12-18 RX ADMIN — Medication 1 TABLET(S): at 17:56

## 2017-12-18 RX ADMIN — LEVETIRACETAM 500 MILLIGRAM(S): 250 TABLET, FILM COATED ORAL at 17:56

## 2017-12-18 RX ADMIN — LEVETIRACETAM 500 MILLIGRAM(S): 250 TABLET, FILM COATED ORAL at 05:41

## 2017-12-18 NOTE — PROGRESS NOTE ADULT - PROBLEM SELECTOR PLAN 4
-Likely demargination in the setting of stress from seizure, no obvious s/s of infection at this time, patient afebrile

## 2017-12-18 NOTE — PROGRESS NOTE ADULT - PROBLEM SELECTOR PLAN 3
-started on amlodipine 5 mg, -140s today (acceptable), continue to monitor BP and adjust regimen as needed

## 2017-12-18 NOTE — PROGRESS NOTE ADULT - ASSESSMENT
77 year old man with unknown PMH BIBEMS found outside shoveling snow soaking wet by neighbors, brought to ED, where he had 2 witnessed seizures, treated w/ Ativan and started on keppra 500 mg BID.  Neurological exam limited by patient participation/mental status however remains stable and no further seizures recorded. Routine EEG w/ video shows right anterior temporal sharp waves with no electrographic seizures. CTH showing no acute abnormalities. MRI pending.     Plan:  - C/w keppra 500 mg BID.  - MRI brain w/ contrast pending  - Ativan PRN breakthrough seizure   - Seizure/fall precautions 77 year old man with unknown PMH BIBEMS found outside shoveling snow soaking wet by neighbors, brought to ED, where he had 2 witnessed seizures, treated w/ Ativan and started on keppra 500 mg BID.  Neurological exam limited by patient participation/mental status however remains stable and no further seizures recorded. Routine EEG w/ video shows right anterior temporal sharp waves with no electrographic seizures. CTH showing no acute abnormalities. MRI pending.     Plan:  - Increase Keppra to 750 Bid  - MRI brain w/ contrast pending  - Ativan PRN breakthrough seizure   - Seizure/fall precautions

## 2017-12-18 NOTE — BEHAVIORAL HEALTH ASSESSMENT NOTE - NSBHCHARTREVIEWVS_PSY_A_CORE FT
Vital Signs Last 24 Hrs  T(C): 37.1 (18 Dec 2017 05:02), Max: 37.6 (17 Dec 2017 20:16)  T(F): 98.8 (18 Dec 2017 05:02), Max: 99.7 (17 Dec 2017 20:16)  HR: 69 (18 Dec 2017 05:02) (69 - 72)  BP: 148/76 (18 Dec 2017 05:02) (140/60 - 148/76)  BP(mean): --  RR: 18 (18 Dec 2017 05:02) (18 - 19)  SpO2: 96% (18 Dec 2017 05:02) (96% - 96%)

## 2017-12-18 NOTE — CHART NOTE - NSCHARTNOTEFT_GEN_A_CORE
Called by nurse for a witnessed seizure episode  lasting less than a minute  Pt is currently asymptomatic    Vital Signs Last 24 Hrs  T(C): 36.9 (18 Dec 2017 22:02), Max: 37.1 (18 Dec 2017 05:02)  T(F): 98.5 (18 Dec 2017 22:02), Max: 98.8 (18 Dec 2017 05:02)  HR: 72 (18 Dec 2017 22:02) (60 - 72)  BP: 146/76 (18 Dec 2017 22:02) (134/69 - 148/76)  RR: 18 (18 Dec 2017 22:02) (18 - 18)  SpO2: 97% (18 Dec 2017 22:02) (96% - 99%)    Ativan 1 mg IVP X 1 Ordered  Case discussed with neurology resident, she recommends to dose an additional dose of Keppra 500 mg now, then increase Keppra to 750 mg PO BID on 12/19/17  Will continue to monitor

## 2017-12-18 NOTE — PROGRESS NOTE ADULT - ASSESSMENT
78 yo M with dementia (unclear baseline), p/w episode of obtundation with course c/b seizures x 2 in the ED that resolved with Ativan. Doing well after being started on Keppra. Pending MRI to evaluate for structural lesions.

## 2017-12-18 NOTE — BEHAVIORAL HEALTH ASSESSMENT NOTE - HPI (INCLUDE ILLNESS QUALITY, SEVERITY, DURATION, TIMING, CONTEXT, MODIFYING FACTORS, ASSOCIATED SIGNS AND SYMPTOMS)
Pt is a 77yr old man, who was found outdoors shoveling snow of random houses for 2 hours, when a concerned neighbor called 911. EMS arrived and found the patient to be soaking wet, The pt was taken to the Ed. in the Ed, the patient stated he was shoveling snow at his house and does not know why he is at the hospital. While in the ED, he was noted to have had 2 undescribed seizures and treated with 2mg Ativan IV.   The pt had a CT Head that is preliminary clear of acute findings, seen by neurology. Pt currently is minimally verbal and unable to discuss his medical condition or answer any questions about himself.     Called emergency contact listed, Marely Benitez (step-daughter) @ [phone].  There was no response and no answering service.  ED later documents that Ms Benitez called back - volunteered information that patient has history of dementia only, lives alone with her checking up on him occasionally.  Also, she noted that he is unable to care for himself adequately and needs nursing home placement.

## 2017-12-18 NOTE — PROGRESS NOTE ADULT - ATTENDING COMMENTS
Dispo: pending MRI to r/o structural lesion, PT recs rehab, family declining SNF, planning to take patient home afterwards with private hire per care coordination note

## 2017-12-18 NOTE — PROGRESS NOTE ADULT - SUBJECTIVE AND OBJECTIVE BOX
Neurology Progress Note        No acute overnight events. No further seizure-like episodes. Pt states he is feeling "okay."    MEDICATIONS  (STANDING):  amLODIPine   Tablet 5 milliGRAM(s) Oral daily  heparin  Injectable 5000 Unit(s) SubCutaneous every 12 hours  levETIRAcetam 500 milliGRAM(s) Oral two times a day  multivitamin 1 Tablet(s) Oral daily  sodium chloride 0.9% lock flush 3 milliLiter(s) IV Push every 8 hours    Objective  Vital Signs Last 24 Hrs  T(C): 37.1 (18 Dec 2017 05:02), Max: 37.6 (17 Dec 2017 20:16)  T(F): 98.8 (18 Dec 2017 05:02), Max: 99.7 (17 Dec 2017 20:16)  HR: 69 (18 Dec 2017 05:02) (65 - 72)  BP: 148/76 (18 Dec 2017 05:02) (133/70 - 148/76)  RR: 18 (18 Dec 2017 05:02) (17 - 19)  SpO2: 96% (18 Dec 2017 05:02) (96% - 97%)    General Exam   General appearance: No acute distress, well-nourished    Neurological Exam  Mental Status:  Awake, oriented to self only, follows simple commands    Cranial Nerves: PERRL, EOMI without nystagmus, visual fields intact, L facial droop? may be 2/2 pt position, no dysarthria    Motor:   Tone:   normal               Strength:  Moving all extremities spontaneously    Tremor:  none appreciated at rest or in action    Sensation: intact grossly to light touch    Deep Tendon Reflexes: 2+ throughout  Toes flexor bilaterally     Other Studies                          13.0   11.28 )-----------( 350      ( 18 Dec 2017 06:20 )             40.1     12-18    140  |  100  |  18  ----------------------------<  93  4.0   |  22  |  1.27    Ca    9.2      18 Dec 2017 06:20  Phos  2.8     12-17  Mg     2.1     12-17    Radiology    CTH:  No acute intracranial hemorrhage, mass effect or evidence of acute   territorial infarct. Mild ventriculomegaly superimposed on cerebral   volume loss.

## 2017-12-18 NOTE — BEHAVIORAL HEALTH ASSESSMENT NOTE - SUMMARY
Pt is a 77yr old man, who was found outdoors shoveling snow of random houses for 2 hours, when a concerned neighbor called 911. EMS arrived and found the patient to be soaking wet, The pt was taken to the Ed. in the Ed, the patient stated he was shoveling snow at his house and does not know why he is at the hospital. While in the ED, he was noted to have had 2 undescribed seizures and treated with 2mg Ativan IV.   The pt had a CT Head that is preliminary clear of acute findings, seen by neurology. Pt currently is minimally verbal and unable to discuss his medical condition or answer any questions about himself. Pt is a 77yr old man, who was found outdoors shoveling snow of random houses for 2 hours, when a concerned neighbor called 911. EMS arrived and found the patient to be soaking wet, The pt was taken to the Ed. in the Ed, the patient stated he was shoveling snow at his house and does not know why he is at the hospital. While in the ED, he was noted to have had 2 undescribed seizures and treated with 2mg Ativan IV.   The pt had a CT Head that is preliminary clear of acute findings, seen by neurology. Pt currently is minimally verbal and unable to discuss his medical condition or answer any questions about himself. He currently lacks capacity to participate in discharge planning.

## 2017-12-18 NOTE — BEHAVIORAL HEALTH ASSESSMENT NOTE - NSBHCHARTREVIEWLAB_PSY_A_CORE FT
13.0   11.28 )-----------( 350      ( 18 Dec 2017 06:20 )             40.1   12-18    140  |  100  |  18  ----------------------------<  93  4.0   |  22  |  1.27    Ca    9.2      18 Dec 2017 06:20  Phos  2.8     12-17  Mg     2.1     12-17

## 2017-12-18 NOTE — PROGRESS NOTE ADULT - PROBLEM SELECTOR PLAN 1
-Patient tolerating po, c/w Keppra 500mg po BID  -EEG with  focal dysfunction in the right anterior temporal region that is potentially epileptogenic, suggestive of a possible structural abnormality  -Neuro recs appreciated, MRI with nohemy pending, Consent obtained by PA, in chart  -No events on tele, tele discontinued  -PT eval, recs: rehab

## 2017-12-18 NOTE — PROGRESS NOTE ADULT - PROBLEM SELECTOR PLAN 2
- c/w supportive care, unable to care for himself adequately  - appreciate psych assistance  - patient does not have capacity to participate in discharge planning  - seroquel qhs as per psych, additional prns for agitation as per psych

## 2017-12-18 NOTE — BEHAVIORAL HEALTH ASSESSMENT NOTE - NSBHCHARTREVIEWINVESTIGATE_PSY_A_CORE FT
Ventricular Rate 84 BPM    Atrial Rate 84 BPM    P-R Interval 142 ms    QRS Duration 108 ms    Q-T Interval 404 ms    QTC Calculation(Bezet) 477 ms    P Axis 42 degrees    R Axis 25 degrees    T Axis 66 degrees    Diagnosis Line Normal sinus rhythm  Voltage criteria for left ventricular hypertrophy  Nonspecific ST abnormality  Abnormal ECG

## 2017-12-18 NOTE — PROGRESS NOTE ADULT - SUBJECTIVE AND OBJECTIVE BOX
Patient is a 77y old  Male who presents with a chief complaint of Seizure x 1 day (16 Dec 2017 01:26)      INTERVAL HPI/OVERNIGHT EVENTS:    MEDICATIONS  (STANDING):  amLODIPine   Tablet 5 milliGRAM(s) Oral daily  heparin  Injectable 5000 Unit(s) SubCutaneous every 12 hours  levETIRAcetam 500 milliGRAM(s) Oral two times a day  multivitamin 1 Tablet(s) Oral daily  sodium chloride 0.9% lock flush 3 milliLiter(s) IV Push every 8 hours    MEDICATIONS  (PRN):      Allergies    No Known Allergies    Intolerances        REVIEW OF SYSTEMS:  Please see interval HPI:    Vital Signs Last 24 Hrs  T(C): 37.1 (18 Dec 2017 05:02), Max: 37.6 (17 Dec 2017 20:16)  T(F): 98.8 (18 Dec 2017 05:02), Max: 99.7 (17 Dec 2017 20:16)  HR: 69 (18 Dec 2017 05:02) (69 - 72)  BP: 148/76 (18 Dec 2017 05:02) (140/60 - 148/76)  BP(mean): --  RR: 18 (18 Dec 2017 05:02) (18 - 19)  SpO2: 96% (18 Dec 2017 05:02) (96% - 96%)  I&O's Detail        PHYSICAL EXAM:  GENERAL:   HEAD:    EYES:   ENMT:   NECK:   NERVOUS SYSTEM:    CHEST/LUNG:   HEART:   ABDOMEN:   EXTREMITIES:    LYMPH:   SKIN:     LABS:                        13.0   11.28 )-----------( 350      ( 18 Dec 2017 06:20 )             40.1     18 Dec 2017 06:20    140    |  100    |  18     ----------------------------<  93     4.0     |  22     |  1.27     Ca    9.2        18 Dec 2017 06:20        CAPILLARY BLOOD GLUCOSE      POCT Blood Glucose.: 70 mg/dL (17 Dec 2017 16:41)  POCT Blood Glucose.: 108 mg/dL (17 Dec 2017 12:17)    BLOOD CULTURE    RADIOLOGY & ADDITIONAL TESTS:    Imaging Personally Reviewed:  [ ] YES     Consultant(s) Notes Reviewed:      Care Discussed with Consultants/Other Providers: Patient is a 77y old  Male who presents with a chief complaint of Seizure x 1 day (16 Dec 2017 01:26)    INTERVAL HPI/OVERNIGHT EVENTS:  Lying in bed, occasionally able to say yes/no to questions. No pain, no seizures.     MEDICATIONS  (STANDING):  amLODIPine   Tablet 5 milliGRAM(s) Oral daily  heparin  Injectable 5000 Unit(s) SubCutaneous every 12 hours  levETIRAcetam 500 milliGRAM(s) Oral two times a day  multivitamin 1 Tablet(s) Oral daily  sodium chloride 0.9% lock flush 3 milliLiter(s) IV Push every 8 hours    MEDICATIONS  (PRN):    Allergies    No Known Allergies    Intolerances    REVIEW OF SYSTEMS:  Please see interval HPI:    Vital Signs Last 24 Hrs  T(C): 37.1 (18 Dec 2017 05:02), Max: 37.6 (17 Dec 2017 20:16)  T(F): 98.8 (18 Dec 2017 05:02), Max: 99.7 (17 Dec 2017 20:16)  HR: 69 (18 Dec 2017 05:02) (69 - 72)  BP: 148/76 (18 Dec 2017 05:02) (140/60 - 148/76)  BP(mean): --  RR: 18 (18 Dec 2017 05:02) (18 - 19)  SpO2: 96% (18 Dec 2017 05:02) (96% - 96%)  I&O's Detail    PHYSICAL EXAM:  GENERAL: NAD, lying in bed  HEAD:  Slight temporal wasting, AT  EYES: clear sclera and conjunctiva clear  NECK: supple  NERVOUS SYSTEM: orientated to self only, intermittently answering questions, does follow command to give thumbs up  CHEST/LUNG: No respiratory distress on RA, CTAB  HEART: S1S2 RRR  ABDOMEN: soft, nontender/nondistended,   EXTREMITIES:  no c/c/e      LABS:                        13.0   11.28 )-----------( 350      ( 18 Dec 2017 06:20 )             40.1     18 Dec 2017 06:20    140    |  100    |  18     ----------------------------<  93     4.0     |  22     |  1.27     Ca    9.2        18 Dec 2017 06:20    CAPILLARY BLOOD GLUCOSE      POCT Blood Glucose.: 70 mg/dL (17 Dec 2017 16:41)  POCT Blood Glucose.: 108 mg/dL (17 Dec 2017 12:17)    BLOOD CULTURE    RADIOLOGY & ADDITIONAL TESTS:    Imaging Personally Reviewed:  [ ] YES   CT head: no infarct/hemorrhage/mass effect  EEF:  There is focal dysfunction in the right anterior temporal region that is potentially epileptogenic. These findings also suggest a possible structural abnormality in the right hemisphere, though postictal slowing is also a consideration. A repeat EEG in 24-48 hour may be helpful in evaluating persistence of right hemisphere changes. There are no electrographic seizures seen.     Consultant(s) Notes Reviewed:  Psych    Care Discussed with Consultants/Other Providers:

## 2017-12-18 NOTE — CHART NOTE - NSCHARTNOTEFT_GEN_A_CORE
Neuro called regarding two episodes of seizure witnessed this afternoon and this evening, resolved s/p ativan 1 mg IV. Recommended increasing standing keppra dose to 750 mg BID with additional 500 mg now. Will see pt in AM for further evaluation.

## 2017-12-18 NOTE — BEHAVIORAL HEALTH ASSESSMENT NOTE - NSBHCHARTREVIEWIMAGING_PSY_A_CORE FT
PROCEDURE DATE:  Dec 15 2017         INTERPRETATION:  CLINICAL INFORMATION: Altered mental status    Technique: Noncontrast CT of the head was performed.    Multiple contiguous axial images were acquired from the skullbase tothe   vertex without the administration of intravenous contrast. Sagittal and   coronal reformats were also submitted for review.    COMPARISON: No prior imaging available for comparison.    FINDINGS:  There is no acute intracranial hemorrhage, mass effect or evidence of   acute territorial infarct. Patchy areas of low-attenuation are seen the   bihemispheric white matter, nonspecific, but likely scalloped chronic   microvascular change.    There is mild disproportionate enlargement of the lateral ventricles as   compared to the degree of sulcal enlargement and variation suggesting   mild ventriculomegaly cerebral volume loss. There is no midline shift.   There are no abnormal extra-axial fluid collections.    The calvarium is intact. The visualized paranasal sinuses and mastoid   complexes appear free of acute disease. There is a high right parietal   scalp lesion likely a sebaceous cyst.    IMPRESSION:   No acute intracranial hemorrhage, mass effect or evidence of acute   territorial infarct. Mild ventriculomegaly superimposed on cerebral   volume loss.

## 2017-12-18 NOTE — PROGRESS NOTE ADULT - ATTENDING COMMENTS
Case discussed with resident. Agree with above plan.  Pt had breakthrough seizure overnight.  Keppra increased to 750 BID.

## 2017-12-19 LAB
BASOPHILS # BLD AUTO: 0.05 K/UL — SIGNIFICANT CHANGE UP (ref 0–0.2)
BASOPHILS NFR BLD AUTO: 0.5 % — SIGNIFICANT CHANGE UP (ref 0–2)
BUN SERPL-MCNC: 20 MG/DL — SIGNIFICANT CHANGE UP (ref 7–23)
CALCIUM SERPL-MCNC: 9.1 MG/DL — SIGNIFICANT CHANGE UP (ref 8.4–10.5)
CHLORIDE SERPL-SCNC: 103 MMOL/L — SIGNIFICANT CHANGE UP (ref 98–107)
CO2 SERPL-SCNC: 23 MMOL/L — SIGNIFICANT CHANGE UP (ref 22–31)
CREAT SERPL-MCNC: 1.15 MG/DL — SIGNIFICANT CHANGE UP (ref 0.5–1.3)
EOSINOPHIL # BLD AUTO: 0.06 K/UL — SIGNIFICANT CHANGE UP (ref 0–0.5)
EOSINOPHIL NFR BLD AUTO: 0.6 % — SIGNIFICANT CHANGE UP (ref 0–6)
FOLATE SERPL-MCNC: 19.5 NG/ML — SIGNIFICANT CHANGE UP (ref 4.7–20)
GLUCOSE SERPL-MCNC: 105 MG/DL — HIGH (ref 70–99)
HCT VFR BLD CALC: 41.3 % — SIGNIFICANT CHANGE UP (ref 39–50)
HGB BLD-MCNC: 13.2 G/DL — SIGNIFICANT CHANGE UP (ref 13–17)
IMM GRANULOCYTES # BLD AUTO: 0.05 # — SIGNIFICANT CHANGE UP
IMM GRANULOCYTES NFR BLD AUTO: 0.5 % — SIGNIFICANT CHANGE UP (ref 0–1.5)
LYMPHOCYTES # BLD AUTO: 0.81 K/UL — LOW (ref 1–3.3)
LYMPHOCYTES # BLD AUTO: 8.3 % — LOW (ref 13–44)
MAGNESIUM SERPL-MCNC: 2.2 MG/DL — SIGNIFICANT CHANGE UP (ref 1.6–2.6)
MCHC RBC-ENTMCNC: 28.7 PG — SIGNIFICANT CHANGE UP (ref 27–34)
MCHC RBC-ENTMCNC: 32 % — SIGNIFICANT CHANGE UP (ref 32–36)
MCV RBC AUTO: 89.8 FL — SIGNIFICANT CHANGE UP (ref 80–100)
MONOCYTES # BLD AUTO: 0.75 K/UL — SIGNIFICANT CHANGE UP (ref 0–0.9)
MONOCYTES NFR BLD AUTO: 7.7 % — SIGNIFICANT CHANGE UP (ref 2–14)
NEUTROPHILS # BLD AUTO: 8.05 K/UL — HIGH (ref 1.8–7.4)
NEUTROPHILS NFR BLD AUTO: 82.4 % — HIGH (ref 43–77)
NRBC # FLD: 0 — SIGNIFICANT CHANGE UP
PLATELET # BLD AUTO: 335 K/UL — SIGNIFICANT CHANGE UP (ref 150–400)
PMV BLD: 9.4 FL — SIGNIFICANT CHANGE UP (ref 7–13)
POTASSIUM SERPL-MCNC: 3.7 MMOL/L — SIGNIFICANT CHANGE UP (ref 3.5–5.3)
POTASSIUM SERPL-SCNC: 3.7 MMOL/L — SIGNIFICANT CHANGE UP (ref 3.5–5.3)
RBC # BLD: 4.6 M/UL — SIGNIFICANT CHANGE UP (ref 4.2–5.8)
RBC # FLD: 14.3 % — SIGNIFICANT CHANGE UP (ref 10.3–14.5)
SODIUM SERPL-SCNC: 143 MMOL/L — SIGNIFICANT CHANGE UP (ref 135–145)
T PALLIDUM AB TITR SER: NEGATIVE — SIGNIFICANT CHANGE UP
TSH SERPL-MCNC: 1.19 UIU/ML — SIGNIFICANT CHANGE UP (ref 0.27–4.2)
VIT B12 SERPL-MCNC: 591 PG/ML — SIGNIFICANT CHANGE UP (ref 200–900)
WBC # BLD: 9.77 K/UL — SIGNIFICANT CHANGE UP (ref 3.8–10.5)
WBC # FLD AUTO: 9.77 K/UL — SIGNIFICANT CHANGE UP (ref 3.8–10.5)

## 2017-12-19 PROCEDURE — 99233 SBSQ HOSP IP/OBS HIGH 50: CPT

## 2017-12-19 PROCEDURE — 95819 EEG AWAKE AND ASLEEP: CPT | Mod: 26

## 2017-12-19 PROCEDURE — 71010: CPT | Mod: 26

## 2017-12-19 PROCEDURE — 71010: CPT | Mod: 26,77

## 2017-12-19 RX ORDER — SODIUM CHLORIDE 0.65 %
1 AEROSOL, SPRAY (ML) NASAL
Qty: 0 | Refills: 0 | Status: DISCONTINUED | OUTPATIENT
Start: 2017-12-19 | End: 2018-01-18

## 2017-12-19 RX ORDER — LEVETIRACETAM 250 MG/1
750 TABLET, FILM COATED ORAL EVERY 12 HOURS
Qty: 0 | Refills: 0 | Status: DISCONTINUED | OUTPATIENT
Start: 2017-12-19 | End: 2017-12-20

## 2017-12-19 RX ORDER — SODIUM CHLORIDE 9 MG/ML
1000 INJECTION, SOLUTION INTRAVENOUS
Qty: 0 | Refills: 0 | Status: DISCONTINUED | OUTPATIENT
Start: 2017-12-19 | End: 2017-12-21

## 2017-12-19 RX ORDER — IPRATROPIUM/ALBUTEROL SULFATE 18-103MCG
3 AEROSOL WITH ADAPTER (GRAM) INHALATION ONCE
Qty: 0 | Refills: 0 | Status: COMPLETED | OUTPATIENT
Start: 2017-12-19 | End: 2017-12-20

## 2017-12-19 RX ORDER — ROBINUL 0.2 MG/ML
0.2 INJECTION INTRAMUSCULAR; INTRAVENOUS EVERY 6 HOURS
Qty: 0 | Refills: 0 | Status: COMPLETED | OUTPATIENT
Start: 2017-12-19 | End: 2017-12-23

## 2017-12-19 RX ADMIN — SODIUM CHLORIDE 3 MILLILITER(S): 9 INJECTION INTRAMUSCULAR; INTRAVENOUS; SUBCUTANEOUS at 05:58

## 2017-12-19 RX ADMIN — ROBINUL 0.2 MILLIGRAM(S): 0.2 INJECTION INTRAMUSCULAR; INTRAVENOUS at 22:27

## 2017-12-19 RX ADMIN — HEPARIN SODIUM 5000 UNIT(S): 5000 INJECTION INTRAVENOUS; SUBCUTANEOUS at 06:22

## 2017-12-19 RX ADMIN — SODIUM CHLORIDE 75 MILLILITER(S): 9 INJECTION, SOLUTION INTRAVENOUS at 16:27

## 2017-12-19 RX ADMIN — Medication 1 SPRAY(S): at 22:27

## 2017-12-19 RX ADMIN — LEVETIRACETAM 400 MILLIGRAM(S): 250 TABLET, FILM COATED ORAL at 17:42

## 2017-12-19 RX ADMIN — SODIUM CHLORIDE 3 MILLILITER(S): 9 INJECTION INTRAMUSCULAR; INTRAVENOUS; SUBCUTANEOUS at 21:53

## 2017-12-19 RX ADMIN — SODIUM CHLORIDE 3 MILLILITER(S): 9 INJECTION INTRAMUSCULAR; INTRAVENOUS; SUBCUTANEOUS at 14:21

## 2017-12-19 RX ADMIN — HEPARIN SODIUM 5000 UNIT(S): 5000 INJECTION INTRAVENOUS; SUBCUTANEOUS at 17:42

## 2017-12-19 NOTE — EEG REPORT - NS EEG TEXT BOX
History:  76YO PRESENTS WITH AMS    -    Medication	  No Data.	    Study Interpretation:    FINDINGS:  The background was continuous, variable, poorly reactive and disorganized. There was no wakefulness recorded and no discernible posterior dominant rhythm. The background consisted of continuous diffuse theta and polymorphic delta activity.     Background Slowing:  Generalized slowing: none was present.   Focal slowing: continuous slowing, generalized and lateralized, right hemisphere     Sleep Background:  Drowsiness was characterized by fragmentation, attenuation, and slowing of the background activity.      Other Paroxysmal Activity:  None     Interictal Epileptiform Activity:   Sharp Waves, Regional, Right Frontal, Maximum Fp2/F8    Ictal Epileptiform Activity or Events:  No clinical events were recorded.  No seizures were recorded.    Activation Procedures:   -Hyperventilation was not performed.    -Photic stimulation was not performed.    Artifacts:  Intermittent myogenic and movement artifacts were noted.    ECG:  The heart rate on single channel ECG was predominantly between  BPM.    EEG Classification / Summary:  Abnormal EEG in the drowsy state  -Sharp Waves, Regional, Right Frontal  -Continuous slowing, Generalized and Lateralized, right hemisphere     Clinical Impression:  Findings suggest a potential seizure focus in the right frontal region, and moderate diffuse encephalopathy with a greater degree of dysfunction in the right hemisphere. No EEG seizures were recorded.        Hallie Ireland DO   Fellow in Neurophysiology/Epilepsy, [facility] [place]        Edenilson Talbot MD  Attending Physician, [facility] [place] History:  78YO PRESENTS WITH AMS    -    Medication	  No Data.	    Study Interpretation:    FINDINGS:  The background was continuous, variable, poorly reactive and disorganized. There was no wakefulness recorded and no discernible posterior dominant rhythm. The background consisted of continuous diffuse theta and polymorphic delta activity.     Background Slowing:  Generalized slowing: none was present.   Focal slowing: continuous slowing, generalized and lateralized, right hemisphere     Sleep Background:  Drowsiness was characterized by fragmentation, attenuation, and slowing of the background activity.      Other Paroxysmal Activity:  None     Interictal Epileptiform Activity:   Sharp Waves, Regional, Right Frontal, Maximum Fp2/F8    Ictal Epileptiform Activity or Events:  No clinical events were recorded.  No seizures were recorded.    Activation Procedures:   -Hyperventilation was not performed.    -Photic stimulation was not performed.    Artifacts:  Intermittent myogenic and movement artifacts were noted.    ECG:  The heart rate on single channel ECG was predominantly between  BPM.    EEG Classification / Summary:  Abnormal EEG in the drowsy state  -Sharp Waves, Regional, Right Frontal  -Continuous slowing, Generalized and Lateralized, right hemisphere     Clinical Impression:  Findings suggest a potential seizure focus in the right frontal region, and moderate diffuse encephalopathy with a greater degree of dysfunction in the right hemisphere. No EEG seizures were recorded. (see ADDENDUM below)        Hallie Ireland DO   Fellow in Neurophysiology/Epilepsy, [facility]        Edenilson Talbot MD  Attending Physician, [facility] [place]    ADDENDUM:  After reviewing the continuous bedside EEG recording overnight, subtle subclinical electrographic seizures evolving over the right frontal region were noted.  Upon second review of this routine EEG study, 3 subclinical electrographic seizures were seen evolving over the right frontal region.  Case discussed with treating neurology team.    Edenilson Talbot MD  EEG / Epilepsy Attending Physician

## 2017-12-19 NOTE — PROGRESS NOTE ADULT - SUBJECTIVE AND OBJECTIVE BOX
Neurology Progress Note        No acute overnight events. two seizures overnight    MEDICATIONS  (STANDING):  amLODIPine   Tablet 5 milliGRAM(s) Oral daily  heparin  Injectable 5000 Unit(s) SubCutaneous every 12 hours  levETIRAcetam 500 milliGRAM(s) Oral two times a day  multivitamin 1 Tablet(s) Oral daily  sodium chloride 0.9% lock flush 3 milliLiter(s) IV Push every 8 hours    Objective  Vital Signs Last 24 Hrs  T(C): 37.1 (18 Dec 2017 05:02), Max: 37.6 (17 Dec 2017 20:16)  T(F): 98.8 (18 Dec 2017 05:02), Max: 99.7 (17 Dec 2017 20:16)  HR: 69 (18 Dec 2017 05:02) (65 - 72)  BP: 148/76 (18 Dec 2017 05:02) (133/70 - 148/76)  RR: 18 (18 Dec 2017 05:02) (17 - 19)  SpO2: 96% (18 Dec 2017 05:02) (96% - 97%)    General Exam   General appearance: No acute distress, well-nourished    Neurological Exam  Mental Status:  Awake, oriented to self only, follows simple commands    Cranial Nerves: PERRL, EOMI without nystagmus, visual fields intact, L facial droop? may be 2/2 pt position, no dysarthria    Motor:   Tone:   normal               Strength:  Moving all extremities spontaneously    Tremor:  none appreciated at rest or in action    Sensation: intact grossly to light touch    Deep Tendon Reflexes: 2+ throughout  Toes flexor bilaterally     Other Studies                          13.0   11.28 )-----------( 350      ( 18 Dec 2017 06:20 )             40.1     12-18    140  |  100  |  18  ----------------------------<  93  4.0   |  22  |  1.27    Ca    9.2      18 Dec 2017 06:20  Phos  2.8     12-17  Mg     2.1     12-17    Radiology    CTH:  No acute intracranial hemorrhage, mass effect or evidence of acute   territorial infarct. Mild ventriculomegaly superimposed on cerebral   volume loss.

## 2017-12-19 NOTE — PROGRESS NOTE ADULT - SUBJECTIVE AND OBJECTIVE BOX
Patient is a 77y old  Male who presents with a chief complaint of Seizure x 1 day (16 Dec 2017 01:26)      INTERVAL HPI/OVERNIGHT EVENTS:    MEDICATIONS  (STANDING):  amLODIPine   Tablet 5 milliGRAM(s) Oral daily  heparin  Injectable 5000 Unit(s) SubCutaneous every 12 hours  levETIRAcetam  IVPB 750 milliGRAM(s) IV Intermittent every 12 hours  multivitamin 1 Tablet(s) Oral daily  QUEtiapine 25 milliGRAM(s) Oral at bedtime  sodium chloride 0.9% lock flush 3 milliLiter(s) IV Push every 8 hours    MEDICATIONS  (PRN):  OLANZapine Injectable 1.25 milliGRAM(s) IntraMuscular every 6 hours PRN Severe agitation  QUEtiapine 25 milliGRAM(s) Oral every 6 hours PRN Agitation      Allergies    No Known Allergies    Intolerances        REVIEW OF SYSTEMS:  Please see interval HPI:    Vital Signs Last 24 Hrs  T(C): 37.2 (19 Dec 2017 11:34), Max: 37.4 (19 Dec 2017 05:34)  T(F): 99 (19 Dec 2017 11:34), Max: 99.4 (19 Dec 2017 05:34)  HR: 66 (19 Dec 2017 11:34) (60 - 72)  BP: 129/68 (19 Dec 2017 11:34) (129/68 - 152/82)  BP(mean): --  RR: 18 (19 Dec 2017 11:34) (18 - 18)  SpO2: 93% (19 Dec 2017 11:34) (93% - 99%)  I&O's Detail        PHYSICAL EXAM:  GENERAL:   HEAD:    EYES:   ENMT:   NECK:   NERVOUS SYSTEM:    CHEST/LUNG:   HEART:   ABDOMEN:   EXTREMITIES:    LYMPH:   SKIN:     LABS:                        13.2   9.77  )-----------( 335      ( 19 Dec 2017 06:00 )             41.3     19 Dec 2017 06:00    143    |  103    |  20     ----------------------------<  105    3.7     |  23     |  1.15     Ca    9.1        19 Dec 2017 06:00  Mg     2.2       19 Dec 2017 06:00        CAPILLARY BLOOD GLUCOSE        BLOOD CULTURE    RADIOLOGY & ADDITIONAL TESTS:    Imaging Personally Reviewed:  [ ] YES     Consultant(s) Notes Reviewed:      Care Discussed with Consultants/Other Providers: Patient is a 77y old  Male who presents with a chief complaint of Seizure x 1 day (16 Dec 2017 01:26)      INTERVAL HPI/OVERNIGHT EVENTS:  Had additional seizures yesterday, s/p ativan. Evaluated by neurology, dose of keppra increased. D/w nursing, also concern for aspiration, will make NPO pending formal swallow eval. Step-Daughter, Chelita Pereyra,: [phone] updated on current hospital course, states lost mom to lung cancer, and though not related to patient by blood, she is the only family he has, wants to do the best for him. Full code for now (states wants to give him a chance), agrees with NPO, will address nutritional status after formal swallow eval (undecided on pleasure feeds vs alternate means of nutrition if no safe consistency can be found).     Per daughter in law, patient has been declining over last few years, he used to take better care of himself (shave/shower etc).     Patient only says few words, "i'm OK", says "no" to pain.     MEDICATIONS  (STANDING):  amLODIPine   Tablet 5 milliGRAM(s) Oral daily  heparin  Injectable 5000 Unit(s) SubCutaneous every 12 hours  levETIRAcetam  IVPB 750 milliGRAM(s) IV Intermittent every 12 hours  multivitamin 1 Tablet(s) Oral daily  QUEtiapine 25 milliGRAM(s) Oral at bedtime  sodium chloride 0.9% lock flush 3 milliLiter(s) IV Push every 8 hours    MEDICATIONS  (PRN):  OLANZapine Injectable 1.25 milliGRAM(s) IntraMuscular every 6 hours PRN Severe agitation  QUEtiapine 25 milliGRAM(s) Oral every 6 hours PRN Agitation      Allergies  No Known Allergies    Intolerances    REVIEW OF SYSTEMS:  Please see interval HPI:    Vital Signs Last 24 Hrs  T(C): 37.2 (19 Dec 2017 11:34), Max: 37.4 (19 Dec 2017 05:34)  T(F): 99 (19 Dec 2017 11:34), Max: 99.4 (19 Dec 2017 05:34)  HR: 66 (19 Dec 2017 11:34) (60 - 72)  BP: 129/68 (19 Dec 2017 11:34) (129/68 - 152/82)  BP(mean): --  RR: 18 (19 Dec 2017 11:34) (18 - 18)  SpO2: 93% (19 Dec 2017 11:34) (93% - 99%)  I&O's Detail    PHYSICAL EXAM:  GENERAL: NAD, lying in bed, sleepy but arousable to voice  HEAD:  Slight temporal wasting, AT  EYES: clear sclera and conjunctiva clear  NECK: supple  NERVOUS SYSTEM: orientated to self only, intermittently answering questions, not really following commands today  CHEST/LUNG: No respiratory distress on RA, CTAB  HEART: S1S2 RRR  ABDOMEN: soft, nontender/nondistended,   EXTREMITIES:  no c/c/e    LABS:                        13.2   9.77  )-----------( 335      ( 19 Dec 2017 06:00 )             41.3     19 Dec 2017 06:00    143    |  103    |  20     ----------------------------<  105    3.7     |  23     |  1.15     Ca    9.1        19 Dec 2017 06:00  Mg     2.2       19 Dec 2017 06:00    CAPILLARY BLOOD GLUCOSE    BLOOD CULTURE    RADIOLOGY & ADDITIONAL TESTS:    Imaging Personally Reviewed:  [ x] YES   CXR: no focal consolidations     Consultant(s) Notes Reviewed:      Care Discussed with Consultants/Other Providers:

## 2017-12-19 NOTE — PROGRESS NOTE ADULT - ATTENDING COMMENTS
Dispo: pending MRI to r/o structural lesion, PT recs rehab, family declining SNF, planning to take patient home after rehab with private hire per care coordination note, will need optimization of seizure control and address nutritional status prior to rehab...

## 2017-12-19 NOTE — PROGRESS NOTE ADULT - PROBLEM SELECTOR PLAN 6
- nursing concerned for dysphagia, noting coughing with PO intake, requiring suctioning  - previously on dysphagia 1 diet with nectar thick liquids  - as per d/w step-daughter, will c/w NPO for now pending formal swallow eval, to readdress nutritional status after recommendations made, started on small amount of IV hydration while NPO

## 2017-12-19 NOTE — PROGRESS NOTE ADULT - ASSESSMENT
77 year old man with unknown PMH BIBEMS found outside shoveling snow soaking wet by neighbors, brought to ED, where he had 2 witnessed seizures, treated w/ Ativan and started on keppra 500 mg BID. Hadf two further seizures overnight    Plan:  - Recommend Video EEG  - MRI brain w/ contrast  - Increase keppra 750mg bid  - Ativan PRN breakthrough seizure   - Seizure/fall precautions

## 2017-12-19 NOTE — PROGRESS NOTE ADULT - ASSESSMENT
76 yo M with dementia (unclear baseline), p/w episode of obtundation with course c/b seizures x 2 in the ED that resolved with Ativan. Pending MRI to evaluate for structural lesions. With additional seizures since yesterday, dose of keppra increased.

## 2017-12-19 NOTE — PROGRESS NOTE ADULT - PROBLEM SELECTOR PLAN 4
-Likely demargination in the setting of stress from seizure, no obvious s/s of infection at this time, patient afebrile, leukocytosis resolved

## 2017-12-19 NOTE — CHART NOTE - NSCHARTNOTEFT_GEN_A_CORE
Notified by nurse patient satting in the 84-85%. Pt was seen and evaluated at bedside. Pt is A and O X 0, lethargic. Unable to answer questions or follow command. Pt's mouth and suction and chest PT was perform. Norebreather was placed and pt was satting in the 93-95%.. CXR was ordered. F    Vital Signs Last 24 Hrs  T(C): 37.3 (19 Dec 2017 20:39), Max: 37.4 (19 Dec 2017 05:34)  T(F): 99.2 (19 Dec 2017 20:39), Max: 99.4 (19 Dec 2017 05:34)  HR: 72 (19 Dec 2017 20:39) (66 - 72)  BP: 133/68 (19 Dec 2017 20:39) (129/68 - 152/82)  BP(mean): --  RR: 18 (19 Dec 2017 20:39) (18 - 18)  SpO2: 93% (19 Dec 2017 11:34) (93% - 96%)    GENERAL APPEARANCE: cachetic, A and O X 0, lethargic.   HEAD: normocephalic.  EYES: unable to access   EARS: External auditory canals clear  NECK: Neck supple.  CARDIAC: Normal S1 and S2. No S3, S4 or murmurs. Rhythm is regular.   LUNGS: Coarse rhonchi. Gurgling throughout.     CXR- prelim, clear lungs     Plan:   Start robinol 0.2 mg IV q 6 hours. Duoneb STAT. Chest PT per routine. Sunction as needed. Nasal saline spray. Humidified oxygen. Continue to monitor.

## 2017-12-20 LAB
BASE EXCESS BLDCOV CALC-SCNC: 3 MMOL/L — HIGH (ref -9.3–0.3)
BUN SERPL-MCNC: 23 MG/DL — SIGNIFICANT CHANGE UP (ref 7–23)
CALCIUM SERPL-MCNC: 9.2 MG/DL — SIGNIFICANT CHANGE UP (ref 8.4–10.5)
CHLORIDE SERPL-SCNC: 108 MMOL/L — HIGH (ref 98–107)
CO2 SERPL-SCNC: 26 MMOL/L — SIGNIFICANT CHANGE UP (ref 22–31)
CREAT SERPL-MCNC: 1.19 MG/DL — SIGNIFICANT CHANGE UP (ref 0.5–1.3)
GLUCOSE SERPL-MCNC: 140 MG/DL — HIGH (ref 70–99)
HCT VFR BLD CALC: 38.3 % — LOW (ref 39–50)
HGB BLD-MCNC: 12.4 G/DL — LOW (ref 13–17)
MAGNESIUM SERPL-MCNC: 2.2 MG/DL — SIGNIFICANT CHANGE UP (ref 1.6–2.6)
MCHC RBC-ENTMCNC: 29 PG — SIGNIFICANT CHANGE UP (ref 27–34)
MCHC RBC-ENTMCNC: 32.4 % — SIGNIFICANT CHANGE UP (ref 32–36)
MCV RBC AUTO: 89.5 FL — SIGNIFICANT CHANGE UP (ref 80–100)
NRBC # FLD: 0 — SIGNIFICANT CHANGE UP
PCO2 BLDCOV: 40 MMHG — SIGNIFICANT CHANGE UP (ref 27–49)
PH BLDCOV: 7.44 PH — SIGNIFICANT CHANGE UP (ref 7.25–7.45)
PLATELET # BLD AUTO: 316 K/UL — SIGNIFICANT CHANGE UP (ref 150–400)
PMV BLD: 9.7 FL — SIGNIFICANT CHANGE UP (ref 7–13)
PO2 BLDCOA: 67.8 MMHG — HIGH (ref 17–41)
POTASSIUM SERPL-MCNC: 3.6 MMOL/L — SIGNIFICANT CHANGE UP (ref 3.5–5.3)
POTASSIUM SERPL-SCNC: 3.6 MMOL/L — SIGNIFICANT CHANGE UP (ref 3.5–5.3)
RBC # BLD: 4.28 M/UL — SIGNIFICANT CHANGE UP (ref 4.2–5.8)
RBC # FLD: 14.3 % — SIGNIFICANT CHANGE UP (ref 10.3–14.5)
SODIUM SERPL-SCNC: 146 MMOL/L — HIGH (ref 135–145)
WBC # BLD: 6.54 K/UL — SIGNIFICANT CHANGE UP (ref 3.8–10.5)
WBC # FLD AUTO: 6.54 K/UL — SIGNIFICANT CHANGE UP (ref 3.8–10.5)

## 2017-12-20 PROCEDURE — 95951: CPT | Mod: 26

## 2017-12-20 PROCEDURE — 99233 SBSQ HOSP IP/OBS HIGH 50: CPT

## 2017-12-20 RX ORDER — FOSPHENYTOIN 50 MG/ML
100 INJECTION INTRAMUSCULAR; INTRAVENOUS THREE TIMES A DAY
Qty: 0 | Refills: 0 | Status: DISCONTINUED | OUTPATIENT
Start: 2017-12-20 | End: 2017-12-20

## 2017-12-20 RX ORDER — FOSPHENYTOIN 50 MG/ML
1360 INJECTION INTRAMUSCULAR; INTRAVENOUS ONCE
Qty: 0 | Refills: 0 | Status: COMPLETED | OUTPATIENT
Start: 2017-12-20 | End: 2017-12-20

## 2017-12-20 RX ORDER — FOSPHENYTOIN 50 MG/ML
100 INJECTION INTRAMUSCULAR; INTRAVENOUS EVERY 8 HOURS
Qty: 0 | Refills: 0 | Status: DISCONTINUED | OUTPATIENT
Start: 2017-12-20 | End: 2017-12-27

## 2017-12-20 RX ORDER — LEVETIRACETAM 250 MG/1
750 TABLET, FILM COATED ORAL ONCE
Qty: 0 | Refills: 0 | Status: COMPLETED | OUTPATIENT
Start: 2017-12-20 | End: 2017-12-20

## 2017-12-20 RX ORDER — LEVETIRACETAM 250 MG/1
1500 TABLET, FILM COATED ORAL EVERY 12 HOURS
Qty: 0 | Refills: 0 | Status: DISCONTINUED | OUTPATIENT
Start: 2017-12-20 | End: 2018-01-09

## 2017-12-20 RX ADMIN — FOSPHENYTOIN 104 MILLIGRAM(S) PE: 50 INJECTION INTRAMUSCULAR; INTRAVENOUS at 22:29

## 2017-12-20 RX ADMIN — SODIUM CHLORIDE 3 MILLILITER(S): 9 INJECTION INTRAMUSCULAR; INTRAVENOUS; SUBCUTANEOUS at 22:32

## 2017-12-20 RX ADMIN — LEVETIRACETAM 400 MILLIGRAM(S): 250 TABLET, FILM COATED ORAL at 20:36

## 2017-12-20 RX ADMIN — HEPARIN SODIUM 5000 UNIT(S): 5000 INJECTION INTRAVENOUS; SUBCUTANEOUS at 05:22

## 2017-12-20 RX ADMIN — SODIUM CHLORIDE 75 MILLILITER(S): 9 INJECTION, SOLUTION INTRAVENOUS at 05:47

## 2017-12-20 RX ADMIN — HEPARIN SODIUM 5000 UNIT(S): 5000 INJECTION INTRAVENOUS; SUBCUTANEOUS at 16:49

## 2017-12-20 RX ADMIN — SODIUM CHLORIDE 3 MILLILITER(S): 9 INJECTION INTRAMUSCULAR; INTRAVENOUS; SUBCUTANEOUS at 05:16

## 2017-12-20 RX ADMIN — SODIUM CHLORIDE 3 MILLILITER(S): 9 INJECTION INTRAMUSCULAR; INTRAVENOUS; SUBCUTANEOUS at 15:39

## 2017-12-20 RX ADMIN — Medication 3 MILLILITER(S): at 02:20

## 2017-12-20 RX ADMIN — Medication 1 MILLIGRAM(S): at 16:24

## 2017-12-20 RX ADMIN — LEVETIRACETAM 400 MILLIGRAM(S): 250 TABLET, FILM COATED ORAL at 05:22

## 2017-12-20 RX ADMIN — FOSPHENYTOIN 154.4 MILLIGRAM(S) PE: 50 INJECTION INTRAMUSCULAR; INTRAVENOUS at 16:24

## 2017-12-20 RX ADMIN — LEVETIRACETAM 400 MILLIGRAM(S): 250 TABLET, FILM COATED ORAL at 16:48

## 2017-12-20 NOTE — PROGRESS NOTE ADULT - PROBLEM SELECTOR PLAN 1
- spoke with neuro today regarding results of vEEG and concern for continued seizure activity, they recommending giving ativan x1, increasing dose of Keppra to 1500 mg bid, providing fosphenytoin load (20 mg/kg), and starting fosphenytoin 100 mg tid, resume telemetry monitoring  - EEG with  focal dysfunction in the right anterior temporal region that is potentially epileptogenic, suggestive of a possible structural abnormality  -Neuro recs appreciated, MRI with nohemy pending, Consent obtained by PA, in chart, will need to attempt imaging when patient more stable  -PT eval, recs: rehab  - monitor airway

## 2017-12-20 NOTE — PROGRESS NOTE ADULT - PROBLEM SELECTOR PLAN 6
- nursing concerned for dysphagia, noting coughing with PO intake, requiring suctioning, c/w oral care  - previously on dysphagia 1 diet with nectar thick liquids  - as per d/w step-daughter, will c/w NPO for now pending formal swallow eval, to readdress nutritional status after recommendations made, started on small amount of IV hydration while NPO  - given current seizure activity, and use of NRB, swallow evaluation deferred for now

## 2017-12-20 NOTE — PROGRESS NOTE ADULT - ASSESSMENT
77 year old man with unknown PMH BIBEMS found outside shoveling snow soaking wet by neighbors, brought to ED, where he had 2 witnessed seizures, treated w/ Ativan and started on keppra, dose increased to 750 mg BID given 2 noted breakthrough seizures on 12/18. 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     Plan:    - Give 750 mg keppra now- Increase keppra to 1500 mg BID standing  - 1 mg ativan IV now  - Load with fosphenytoin 20 mg/kg (start telemetry monitoring) and start 100 mg TID.  - Obtain AM dilantin level  - C/w vEEG  - MRI brain w/ contrast when able  - Ativan PRN breakthrough seizure   - Seizure/fall precautions  - Plan d/w epilepsy fellow and with primary team 77 year old man with unknown PMH BIBEMS found outside shoveling snow soaking wet by neighbors, brought to ED, where he had 2 witnessed seizures, treated w/ Ativan and started on keppra, dose increased to 750 mg BID given 2 noted breakthrough seizures on 12/18. 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     Plan:    - Give 750 mg keppra now- Increase keppra to 1500 mg BID standing  - 1 mg ativan IV now  - Load with fosphenytoin 20 mg/kg (start telemetry monitoring) and start 100 mg TID.  - Obtain AM dilantin level  - C/w vEEG  - MRI brain w/ contrast when able  - Ativan PRN breakthrough seizure   - Seizure/fall precautions  - Plan d/w epilepsy fellow, neuro attending, and with primary team

## 2017-12-20 NOTE — SWALLOW BEDSIDE ASSESSMENT ADULT - COMMENTS
SLP spoke with MD team at pager #[phone], patient is on a non-rebreather and is not appropriate for oral intake at this time.  Plan per MD team is to wean off non-rebreather.  As per flowsheet, patient remains on non-rebreather at 14:00 note, and patient continues to be lethargic.  Attempt to page team again was done at 15:15pm however unable to make contact.  This department to re-attempt and follow up with the medical team in the morning, to see if medical status has improved and if patient is a candidate for swallowing evaluation.

## 2017-12-20 NOTE — PROGRESS NOTE ADULT - SUBJECTIVE AND OBJECTIVE BOX
Neurology Progress Note        On routine EEG, multiple partial seizures seen. Pt remains at baseline recent mental status.    MEDICATIONS  (STANDING):  amLODIPine   Tablet 5 milliGRAM(s) Oral daily  heparin  Injectable 5000 Unit(s) SubCutaneous every 12 hours  levETIRAcetam 500 milliGRAM(s) Oral two times a day  multivitamin 1 Tablet(s) Oral daily  sodium chloride 0.9% lock flush 3 milliLiter(s) IV Push every 8 hours    Objective  Vital Signs Last 24 Hrs  T(C): 37.1 (18 Dec 2017 05:02), Max: 37.6 (17 Dec 2017 20:16)  T(F): 98.8 (18 Dec 2017 05:02), Max: 99.7 (17 Dec 2017 20:16)  HR: 69 (18 Dec 2017 05:02) (65 - 72)  BP: 148/76 (18 Dec 2017 05:02) (133/70 - 148/76)  RR: 18 (18 Dec 2017 05:02) (17 - 19)  SpO2: 96% (18 Dec 2017 05:02) (96% - 97%)    Neurological Exam  Mental Status:  Awake, oriented to self, tracks. Does not follow commands    Cranial Nerves: PERRL, EOMI, L facial droop? may be 2/2 pt position    Motor:   Tone:   Slightly increased tone in LUE            Strength:  Moving all extremities spontaneously    Intermittent myoclonus noted of left lower extremity    Deep Tendon Reflexes: 3+ LUE biceps, triceps, brachioradilis, patella. 2+ RUE/RLE    Other Studies                                     12.4   6.54  )-----------( 316      ( 20 Dec 2017 06:35 )             38.3     12-20    146<H>  |  108<H>  |  23  ----------------------------<  140<H>  3.6   |  26  |  1.19    Ca    9.2      20 Dec 2017 06:35  Mg     2.2     12-20      EEG Classification / Summary:  Abnormal EEG in the drowsy state  -Clinical Seizures: No clinical signs  -EEG Seizures: Regional, Right Frontal    -Sharp Waves, Regional, Right Frontal  -Continuous slowing, Generalized and Lateralized, right hemisphere     Clinical Impression:  This EEG provides evidence of subclinical electrographic seizures arising from the right frontal region.  Up to 10 seizures per hour were recorded, with no associated clinical signs.  Seizure duration is 30-50 seconds.  There is also evidence of a moderate diffuse encephalopathy with a greater degree of dysfunction in the right hemisphere.  Case discussed with neuro team.

## 2017-12-20 NOTE — PROGRESS NOTE ADULT - PROBLEM SELECTOR PLAN 3
-started on amlodipine 5 mg, SBP currently (acceptable), continue to monitor BP and adjust regimen as needed

## 2017-12-20 NOTE — EEG REPORT - NS EEG TEXT BOX
History:  -Concern for Seizures     Medication	  No Data.	    Interpretation:    17-  Startin2017-2017    Daily EEG Visual Analysis  FINDINGS:  The background was continuous, variable, poorly reactive and disorganized. There was no wakefulness recorded and no discernible posterior dominant rhythm. The background consisted of continuous diffuse theta and polymorphic delta activity.     Background Slowing:  Generalized slowing: none was present.   Focal slowing: continuous slowing, generalized and lateralized, right hemisphere     Sleep Background:  Drowsiness was characterized by fragmentation, attenuation, and slowing of the background activity.      Other Paroxysmal Activity:  None     Interictal Epileptiform Activity:   Abundant Sharp Waves, Regional, Right Frontal, Maximum Fp2    Ictal Epileptiform Activity or Events:  Clinical Seizures: No clinical signs  EEG Seizures: Regional, Right Frontal (up to 10 per hour)  Description of EEG Seizures: rhythmic alpha activity develops at Fp2 and spreads to F4/F8/C4, and then evolves into rhythmic delta activity over the right frontocentral region.  Duration 30-50 seconds    Activation Procedures:   -Hyperventilation was not performed.    -Photic stimulation was not performed.    Artifacts:  Intermittent myogenic and movement artifacts were noted.    ECG:  The heart rate on single channel ECG was predominantly between  BPM.    EEG Classification / Summary:  Abnormal EEG in the drowsy state  -Clinical Seizures: No clinical signs  -EEG Seizures: Regional, Right Frontal    -Sharp Waves, Regional, Right Frontal  -Continuous slowing, Generalized and Lateralized, right hemisphere     Clinical Impression:  This EEG provides evidence of subclinical electrographic seizures arising from the right frontal region.  Up to 10 seizures per hour were recorded, with no associated clinical signs.  Seizure duration is 30-50 seconds.  There is also evidence of a moderate diffuse encephalopathy with a greater degree of dysfunction in the right hemisphere.  Case discussed with neuro team.        Hallie Ireland DO   Fellow in Neurophysiology/Epilepsy, [facility]      Edenilson Talbot MD  Attending Physician, [facility] [place]

## 2017-12-20 NOTE — PROGRESS NOTE ADULT - ASSESSMENT
78 yo M with dementia (unclear baseline), p/w episode of obtundation with course c/b seizures x 2 in the ED that resolved with Ativan. Pending MRI to evaluate for structural lesions. 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

## 2017-12-20 NOTE — PROGRESS NOTE ADULT - SUBJECTIVE AND OBJECTIVE BOX
Patient is a 77y old  Male who presents with a chief complaint of Seizure x 1 day (16 Dec 2017 01:26)      INTERVAL HPI/OVERNIGHT EVENTS:    MEDICATIONS  (STANDING):  amLODIPine   Tablet 5 milliGRAM(s) Oral daily  dextrose 5% + sodium chloride 0.9%. 1000 milliLiter(s) (75 mL/Hr) IV Continuous <Continuous>  heparin  Injectable 5000 Unit(s) SubCutaneous every 12 hours  levETIRAcetam  IVPB 750 milliGRAM(s) IV Intermittent every 12 hours  multivitamin 1 Tablet(s) Oral daily  QUEtiapine 25 milliGRAM(s) Oral at bedtime  sodium chloride 0.9% lock flush 3 milliLiter(s) IV Push every 8 hours    MEDICATIONS  (PRN):  glycopyrrolate Injectable 0.2 milliGRAM(s) IV Push every 6 hours PRN chest congestion  OLANZapine Injectable 1.25 milliGRAM(s) IntraMuscular every 6 hours PRN Severe agitation  QUEtiapine 25 milliGRAM(s) Oral every 6 hours PRN Agitation  sodium chloride 0.65% Nasal 1 Spray(s) Both Nostrils four times a day PRN Congestion      Allergies    No Known Allergies    Intolerances        REVIEW OF SYSTEMS:  Please see interval HPI:    Vital Signs Last 24 Hrs  T(C): 37.3 (20 Dec 2017 05:12), Max: 37.3 (19 Dec 2017 20:39)  T(F): 99.1 (20 Dec 2017 05:12), Max: 99.2 (19 Dec 2017 20:39)  HR: 78 (20 Dec 2017 05:12) (66 - 81)  BP: 132/74 (20 Dec 2017 05:12) (129/68 - 133/68)  BP(mean): --  RR: 18 (20 Dec 2017 05:12) (18 - 18)  SpO2: 94% (20 Dec 2017 05:12) (83% - 94%)  I&O's Detail        PHYSICAL EXAM:  GENERAL:   HEAD:    EYES:   ENMT:   NECK:   NERVOUS SYSTEM:    CHEST/LUNG:   HEART:   ABDOMEN:   EXTREMITIES:    LYMPH:   SKIN:     LABS:                        12.4   6.54  )-----------( 316      ( 20 Dec 2017 06:35 )             38.3     20 Dec 2017 06:35    146    |  108    |  23     ----------------------------<  140    3.6     |  26     |  1.19     Ca    9.2        20 Dec 2017 06:35  Mg     2.2       20 Dec 2017 06:35        CAPILLARY BLOOD GLUCOSE        BLOOD CULTURE    RADIOLOGY & ADDITIONAL TESTS:    Imaging Personally Reviewed:  [ ] YES     Consultant(s) Notes Reviewed:      Care Discussed with Consultants/Other Providers: Patient is a 77y old  Male who presents with a chief complaint of Seizure x 1 day (16 Dec 2017 01:26)    INTERVAL HPI/OVERNIGHT EVENTS:  Patient seen and examined earlier today.   On vEEG, wearing NRB with coarse upper airway sounds, secretions. Nurse to provide additional suctioning. Able to turn eyes briefly to look at provider, else not speaking, not following commands.     MEDICATIONS  (STANDING):  amLODIPine   Tablet 5 milliGRAM(s) Oral daily  dextrose 5% + sodium chloride 0.9%. 1000 milliLiter(s) (75 mL/Hr) IV Continuous <Continuous>  heparin  Injectable 5000 Unit(s) SubCutaneous every 12 hours  levETIRAcetam  IVPB 750 milliGRAM(s) IV Intermittent every 12 hours  multivitamin 1 Tablet(s) Oral daily  QUEtiapine 25 milliGRAM(s) Oral at bedtime  sodium chloride 0.9% lock flush 3 milliLiter(s) IV Push every 8 hours    MEDICATIONS  (PRN):  glycopyrrolate Injectable 0.2 milliGRAM(s) IV Push every 6 hours PRN chest congestion  OLANZapine Injectable 1.25 milliGRAM(s) IntraMuscular every 6 hours PRN Severe agitation  QUEtiapine 25 milliGRAM(s) Oral every 6 hours PRN Agitation  sodium chloride 0.65% Nasal 1 Spray(s) Both Nostrils four times a day PRN Congestion    Allergies  No Known Allergies    Intolerances    REVIEW OF SYSTEMS:  Please see interval HPI:    Vital Signs Last 24 Hrs  T(C): 37.3 (20 Dec 2017 05:12), Max: 37.3 (19 Dec 2017 20:39)  T(F): 99.1 (20 Dec 2017 05:12), Max: 99.2 (19 Dec 2017 20:39)  HR: 78 (20 Dec 2017 05:12) (66 - 81)  BP: 132/74 (20 Dec 2017 05:12) (129/68 - 133/68)  BP(mean): --  RR: 18 (20 Dec 2017 05:12) (18 - 18)  SpO2: 94% (20 Dec 2017 05:12) (83% - 94%)  I&O's Detail    PHYSICAL EXAM:  GENERAL: Lying in bed, not following commands  HEAD:  slight temporal wasting, AT  EYES: No eye deviation  ENMT: NRB in place, with thick secretions on corner of mouth/chin  NERVOUS SYSTEM: blinks to threat, turns eyes to provider with repeated prodding, having rhythmic movement of his left lower extremity  CHEST/LUNG: Coarse transmitted upper airway sounds  HEART: S1S2 RRR  ABDOMEN: soft, non-tender  EXTREMITIES:  no c/c/e    LABS:                        12.4   6.54  )-----------( 316      ( 20 Dec 2017 06:35 )             38.3     20 Dec 2017 06:35    146    |  108    |  23     ----------------------------<  140    3.6     |  26     |  1.19     Ca    9.2        20 Dec 2017 06:35  Mg     2.2       20 Dec 2017 06:35      CAPILLARY BLOOD GLUCOSE    BLOOD CULTURE    RADIOLOGY & ADDITIONAL TESTS:  VEEG: This EEG provides evidence of subclinical electrographic seizures arising from the right frontal region.  Up to 10 seizures per hour were recorded, with no associated clinical signs.  Seizure duration is 30-50 seconds.  There is also evidence of a moderate diffuse encephalopathy with a greater degree of dysfunction in the right hemisphere.    Imaging Personally Reviewed:  [x ] YES   CXR clear lungs    Consultant(s) Notes Reviewed:      Care Discussed with Consultants/Other Providers: Neuro

## 2017-12-21 DIAGNOSIS — R53.2 FUNCTIONAL QUADRIPLEGIA: ICD-10-CM

## 2017-12-21 DIAGNOSIS — E87.0 HYPEROSMOLALITY AND HYPERNATREMIA: ICD-10-CM

## 2017-12-21 DIAGNOSIS — I10 ESSENTIAL (PRIMARY) HYPERTENSION: ICD-10-CM

## 2017-12-21 LAB
BUN SERPL-MCNC: 24 MG/DL — HIGH (ref 7–23)
CALCIUM SERPL-MCNC: 8.9 MG/DL — SIGNIFICANT CHANGE UP (ref 8.4–10.5)
CHLORIDE SERPL-SCNC: 113 MMOL/L — HIGH (ref 98–107)
CO2 SERPL-SCNC: 26 MMOL/L — SIGNIFICANT CHANGE UP (ref 22–31)
CREAT SERPL-MCNC: 1.19 MG/DL — SIGNIFICANT CHANGE UP (ref 0.5–1.3)
GLUCOSE SERPL-MCNC: 135 MG/DL — HIGH (ref 70–99)
HCT VFR BLD CALC: 37.2 % — LOW (ref 39–50)
HGB BLD-MCNC: 11.7 G/DL — LOW (ref 13–17)
MAGNESIUM SERPL-MCNC: 2.6 MG/DL — SIGNIFICANT CHANGE UP (ref 1.6–2.6)
MCHC RBC-ENTMCNC: 28.8 PG — SIGNIFICANT CHANGE UP (ref 27–34)
MCHC RBC-ENTMCNC: 31.5 % — LOW (ref 32–36)
MCV RBC AUTO: 91.6 FL — SIGNIFICANT CHANGE UP (ref 80–100)
NRBC # FLD: 0 — SIGNIFICANT CHANGE UP
PHENYTOIN FREE SERPL-MCNC: 16.3 UG/ML — SIGNIFICANT CHANGE UP (ref 10–20)
PLATELET # BLD AUTO: 309 K/UL — SIGNIFICANT CHANGE UP (ref 150–400)
PMV BLD: 9.9 FL — SIGNIFICANT CHANGE UP (ref 7–13)
POTASSIUM SERPL-MCNC: 3.8 MMOL/L — SIGNIFICANT CHANGE UP (ref 3.5–5.3)
POTASSIUM SERPL-SCNC: 3.8 MMOL/L — SIGNIFICANT CHANGE UP (ref 3.5–5.3)
RBC # BLD: 4.06 M/UL — LOW (ref 4.2–5.8)
RBC # FLD: 14.7 % — HIGH (ref 10.3–14.5)
SODIUM SERPL-SCNC: 149 MMOL/L — HIGH (ref 135–145)
WBC # BLD: 9.6 K/UL — SIGNIFICANT CHANGE UP (ref 3.8–10.5)
WBC # FLD AUTO: 9.6 K/UL — SIGNIFICANT CHANGE UP (ref 3.8–10.5)

## 2017-12-21 PROCEDURE — 99233 SBSQ HOSP IP/OBS HIGH 50: CPT

## 2017-12-21 RX ORDER — LACOSAMIDE 50 MG/1
300 TABLET ORAL ONCE
Qty: 0 | Refills: 0 | Status: DISCONTINUED | OUTPATIENT
Start: 2017-12-21 | End: 2017-12-21

## 2017-12-21 RX ORDER — SODIUM CHLORIDE 9 MG/ML
1000 INJECTION, SOLUTION INTRAVENOUS
Qty: 0 | Refills: 0 | Status: DISCONTINUED | OUTPATIENT
Start: 2017-12-21 | End: 2017-12-22

## 2017-12-21 RX ORDER — LACOSAMIDE 50 MG/1
150 TABLET ORAL EVERY 12 HOURS
Qty: 0 | Refills: 0 | Status: DISCONTINUED | OUTPATIENT
Start: 2017-12-22 | End: 2017-12-22

## 2017-12-21 RX ORDER — LACOSAMIDE 50 MG/1
TABLET ORAL
Qty: 0 | Refills: 0 | Status: DISCONTINUED | OUTPATIENT
Start: 2017-12-21 | End: 2017-12-22

## 2017-12-21 RX ADMIN — FOSPHENYTOIN 104 MILLIGRAM(S) PE: 50 INJECTION INTRAMUSCULAR; INTRAVENOUS at 21:44

## 2017-12-21 RX ADMIN — SODIUM CHLORIDE 75 MILLILITER(S): 9 INJECTION, SOLUTION INTRAVENOUS at 21:28

## 2017-12-21 RX ADMIN — LEVETIRACETAM 400 MILLIGRAM(S): 250 TABLET, FILM COATED ORAL at 17:30

## 2017-12-21 RX ADMIN — SODIUM CHLORIDE 3 MILLILITER(S): 9 INJECTION INTRAMUSCULAR; INTRAVENOUS; SUBCUTANEOUS at 21:22

## 2017-12-21 RX ADMIN — SODIUM CHLORIDE 3 MILLILITER(S): 9 INJECTION INTRAMUSCULAR; INTRAVENOUS; SUBCUTANEOUS at 06:01

## 2017-12-21 RX ADMIN — FOSPHENYTOIN 104 MILLIGRAM(S) PE: 50 INJECTION INTRAMUSCULAR; INTRAVENOUS at 15:26

## 2017-12-21 RX ADMIN — LEVETIRACETAM 400 MILLIGRAM(S): 250 TABLET, FILM COATED ORAL at 05:40

## 2017-12-21 RX ADMIN — SODIUM CHLORIDE 75 MILLILITER(S): 9 INJECTION, SOLUTION INTRAVENOUS at 10:15

## 2017-12-21 RX ADMIN — ROBINUL 0.2 MILLIGRAM(S): 0.2 INJECTION INTRAMUSCULAR; INTRAVENOUS at 22:10

## 2017-12-21 RX ADMIN — SODIUM CHLORIDE 3 MILLILITER(S): 9 INJECTION INTRAMUSCULAR; INTRAVENOUS; SUBCUTANEOUS at 13:09

## 2017-12-21 RX ADMIN — FOSPHENYTOIN 104 MILLIGRAM(S) PE: 50 INJECTION INTRAMUSCULAR; INTRAVENOUS at 05:40

## 2017-12-21 RX ADMIN — HEPARIN SODIUM 5000 UNIT(S): 5000 INJECTION INTRAVENOUS; SUBCUTANEOUS at 17:29

## 2017-12-21 RX ADMIN — HEPARIN SODIUM 5000 UNIT(S): 5000 INJECTION INTRAVENOUS; SUBCUTANEOUS at 05:53

## 2017-12-21 RX ADMIN — SODIUM CHLORIDE 75 MILLILITER(S): 9 INJECTION, SOLUTION INTRAVENOUS at 06:01

## 2017-12-21 RX ADMIN — LACOSAMIDE 160 MILLIGRAM(S): 50 TABLET ORAL at 17:47

## 2017-12-21 RX ADMIN — ROBINUL 0.2 MILLIGRAM(S): 0.2 INJECTION INTRAMUSCULAR; INTRAVENOUS at 16:36

## 2017-12-21 NOTE — PROGRESS NOTE ADULT - ATTENDING COMMENTS
Pt remains poorly responsive with intermittent rhythmic shaking of the LLE.  On VEEG monitoring with correlative right frontal focus of sharp wavers.  On Keppra 1500 bid and  qd.  Focal status.  Follow levels and D/C all sedatives. Will follow clinically.  Awaiting results VEEG.  Check lytes.

## 2017-12-21 NOTE — PROGRESS NOTE ADULT - PROBLEM SELECTOR PLAN 2
Na 149 today (setting of decreased PO intake), estimated free water deficit 2.2 L   will change IV Fluids to D5, c/w IV fluid hydration, monitor Na

## 2017-12-21 NOTE — PROGRESS NOTE ADULT - PROBLEM SELECTOR PLAN 4
c/w supportive care, unable to care for himself adequately  - appreciate psych assistance  - patient does not have capacity to participate in discharge planning  -holding seroquel (avoiding sedatives as per neuro, patient has not been able to take po meds in this period anyway)

## 2017-12-21 NOTE — PROGRESS NOTE ADULT - SUBJECTIVE AND OBJECTIVE BOX
Patient is a 77y old  Male who presents with a chief complaint of Seizure x 1 day (16 Dec 2017 01:26)      INTERVAL HPI/OVERNIGHT EVENTS:    MEDICATIONS  (STANDING):  amLODIPine   Tablet 5 milliGRAM(s) Oral daily  dextrose 5%. 1000 milliLiter(s) (75 mL/Hr) IV Continuous <Continuous>  fosphenytoin IVPB 100 milliGRAM(s) PE IV Intermittent every 8 hours  heparin  Injectable 5000 Unit(s) SubCutaneous every 12 hours  levETIRAcetam  IVPB 1500 milliGRAM(s) IV Intermittent every 12 hours  multivitamin 1 Tablet(s) Oral daily  QUEtiapine 25 milliGRAM(s) Oral at bedtime  sodium chloride 0.9% lock flush 3 milliLiter(s) IV Push every 8 hours    MEDICATIONS  (PRN):  glycopyrrolate Injectable 0.2 milliGRAM(s) IV Push every 6 hours PRN chest congestion  OLANZapine Injectable 1.25 milliGRAM(s) IntraMuscular every 6 hours PRN Severe agitation  QUEtiapine 25 milliGRAM(s) Oral every 6 hours PRN Agitation  sodium chloride 0.65% Nasal 1 Spray(s) Both Nostrils four times a day PRN Congestion      Allergies    No Known Allergies    Intolerances        REVIEW OF SYSTEMS:  Please see interval HPI:    Vital Signs Last 24 Hrs  T(C): 37.6 (21 Dec 2017 07:22), Max: 38.1 (21 Dec 2017 03:57)  T(F): 99.6 (21 Dec 2017 07:22), Max: 100.6 (21 Dec 2017 03:57)  HR: 66 (21 Dec 2017 03:57) (66 - 69)  BP: 116/55 (21 Dec 2017 03:57) (116/55 - 129/76)  BP(mean): --  RR: 18 (21 Dec 2017 03:57) (18 - 18)  SpO2: 100% (21 Dec 2017 03:57) (99% - 100%)  I&O's Detail        PHYSICAL EXAM:  GENERAL:   HEAD:    EYES:   ENMT:   NECK:   NERVOUS SYSTEM:    CHEST/LUNG:   HEART:   ABDOMEN:   EXTREMITIES:    LYMPH:   SKIN:     LABS:                        11.7   9.60  )-----------( 309      ( 21 Dec 2017 06:40 )             37.2     21 Dec 2017 06:40    149    |  113    |  24     ----------------------------<  135    3.8     |  26     |  1.19     Ca    8.9        21 Dec 2017 06:40  Mg     2.6       21 Dec 2017 06:40        CAPILLARY BLOOD GLUCOSE        BLOOD CULTURE    RADIOLOGY & ADDITIONAL TESTS:    Imaging Personally Reviewed:  [ ] YES     Consultant(s) Notes Reviewed:      Care Discussed with Consultants/Other Providers: Neuro Patient is a 77y old  Male who presents with a chief complaint of Seizure x 1 day (16 Dec 2017 01:26)    INTERVAL HPI/OVERNIGHT EVENTS:  Still having seizures. D/w neuro re: medication changes. Updated stepdaughter Chelita. She requests [facility]  to make sure patient gets his last rites in case anything were to happen. Would want trial of intubation if his respiratory status worsens/airway is compromised.      MEDICATIONS  (STANDING):  amLODIPine   Tablet 5 milliGRAM(s) Oral daily  dextrose 5%. 1000 milliLiter(s) (75 mL/Hr) IV Continuous <Continuous>  fosphenytoin IVPB 100 milliGRAM(s) PE IV Intermittent every 8 hours  heparin  Injectable 5000 Unit(s) SubCutaneous every 12 hours  levETIRAcetam  IVPB 1500 milliGRAM(s) IV Intermittent every 12 hours  multivitamin 1 Tablet(s) Oral daily  QUEtiapine 25 milliGRAM(s) Oral at bedtime  sodium chloride 0.9% lock flush 3 milliLiter(s) IV Push every 8 hours    MEDICATIONS  (PRN):  glycopyrrolate Injectable 0.2 milliGRAM(s) IV Push every 6 hours PRN chest congestion  OLANZapine Injectable 1.25 milliGRAM(s) IntraMuscular every 6 hours PRN Severe agitation  QUEtiapine 25 milliGRAM(s) Oral every 6 hours PRN Agitation  sodium chloride 0.65% Nasal 1 Spray(s) Both Nostrils four times a day PRN Congestion    Allergies  No Known Allergies    Intolerances    REVIEW OF SYSTEMS:  Please see interval HPI:    Vital Signs Last 24 Hrs  T(C): 37.6 (21 Dec 2017 07:22), Max: 38.1 (21 Dec 2017 03:57)  T(F): 99.6 (21 Dec 2017 07:22), Max: 100.6 (21 Dec 2017 03:57)  HR: 66 (21 Dec 2017 03:57) (66 - 69)  BP: 116/55 (21 Dec 2017 03:57) (116/55 - 129/76)  BP(mean): --  RR: 18 (21 Dec 2017 03:57) (18 - 18)  SpO2: 100% (21 Dec 2017 03:57) (99% - 100%)  I&O's Detail    PHYSICAL EXAM:  GENERAL: Lethargic, lying in bed  HEAD:  VEEG in place, slight temporal wasting  ENMT: NRB in place  NERVOUS SYSTEM:  not responding to voice, not following commands, no spontaneous movement  CHEST/LUNG: coarse upper airway sounds  HEART: S1S2 RRR  ABDOMEN: soft, non-distended  EXTREMITIES:  no c/c/e    LABS:                        11.7   9.60  )-----------( 309      ( 21 Dec 2017 06:40 )             37.2     21 Dec 2017 06:40    149    |  113    |  24     ----------------------------<  135    3.8     |  26     |  1.19     Ca    8.9        21 Dec 2017 06:40  Mg     2.6       21 Dec 2017 06:40    CAPILLARY BLOOD GLUCOSE    BLOOD CULTURE    RADIOLOGY & ADDITIONAL TESTS:    Imaging Personally Reviewed:  [ ] YES   VEEG: seizures arising from the right frontal region up to 15 seizures/hour, duration 30-90 seconds, with moderate diffuse encephalopathy    Consultant(s) Notes Reviewed:      Care Discussed with Consultants/Other Providers: Neuro Patient is a 77y old  Male who presents with a chief complaint of Seizure x 1 day (16 Dec 2017 01:26)    INTERVAL HPI/OVERNIGHT EVENTS:  Still having seizures. D/w neuro re: medication changes. Updated stepdaughter Chelita. She requests [facility]  to make sure patient gets his last rites in case anything were to happen. Would want trial of intubation if his respiratory status worsens/airway is compromised.      MEDICATIONS  (STANDING):  amLODIPine   Tablet 5 milliGRAM(s) Oral daily  dextrose 5%. 1000 milliLiter(s) (75 mL/Hr) IV Continuous <Continuous>  fosphenytoin IVPB 100 milliGRAM(s) PE IV Intermittent every 8 hours  heparin  Injectable 5000 Unit(s) SubCutaneous every 12 hours  levETIRAcetam  IVPB 1500 milliGRAM(s) IV Intermittent every 12 hours  multivitamin 1 Tablet(s) Oral daily  QUEtiapine 25 milliGRAM(s) Oral at bedtime  sodium chloride 0.9% lock flush 3 milliLiter(s) IV Push every 8 hours    MEDICATIONS  (PRN):  glycopyrrolate Injectable 0.2 milliGRAM(s) IV Push every 6 hours PRN chest congestion  OLANZapine Injectable 1.25 milliGRAM(s) IntraMuscular every 6 hours PRN Severe agitation  QUEtiapine 25 milliGRAM(s) Oral every 6 hours PRN Agitation  sodium chloride 0.65% Nasal 1 Spray(s) Both Nostrils four times a day PRN Congestion    Allergies  No Known Allergies    Intolerances    REVIEW OF SYSTEMS:  Please see interval HPI:    Vital Signs Last 24 Hrs  T(C): 37.6 (21 Dec 2017 07:22), Max: 38.1 (21 Dec 2017 03:57)  T(F): 99.6 (21 Dec 2017 07:22), Max: 100.6 (21 Dec 2017 03:57)  HR: 66 (21 Dec 2017 03:57) (66 - 69)  BP: 116/55 (21 Dec 2017 03:57) (116/55 - 129/76)  BP(mean): --  RR: 18 (21 Dec 2017 03:57) (18 - 18)  SpO2: 100% (21 Dec 2017 03:57) (99% - 100%)  I&O's Detail    PHYSICAL EXAM:  GENERAL: Lethargic, lying in bed  HEAD:  VEEG in place, slight temporal wasting  ENMT: NRB in place  NERVOUS SYSTEM:  not responding to voice, not following commands, no spontaneous movement  CHEST/LUNG: coarse upper airway sounds  HEART: S1S2 RRR  ABDOMEN: soft, non-distended  EXTREMITIES:  no c/c/e    LABS:                        11.7   9.60  )-----------( 309      ( 21 Dec 2017 06:40 )             37.2     21 Dec 2017 06:40    149    |  113    |  24     ----------------------------<  135    3.8     |  26     |  1.19     Ca    8.9        21 Dec 2017 06:40  Mg     2.6       21 Dec 2017 06:40    CAPILLARY BLOOD GLUCOSE    BLOOD CULTURE    RADIOLOGY & ADDITIONAL TESTS:    Imaging Personally Reviewed:  [ ] YES   VEEG: seizures arising from the right frontal region up to 15 seizures/hour, duration 30-90 seconds, with moderate diffuse encephalopathy    Consultant(s) Notes Reviewed:      Care Discussed with Consultants/Other Providers: Neuro, Clinical impact nurse

## 2017-12-21 NOTE — SWALLOW BEDSIDE ASSESSMENT ADULT - COMMENTS
SLP follow up as per plan. Patient continues on non-rebreather. As per neurology, EEG done yesterday showing "evidence of subclinical electrographic seizures arising from the right frontal region. Up to 10 seizures per hour were recorded, with no associated clinical signs.  Seizure duration is 30-50 seconds.  There is also evidence of a moderate diffuse encephalopathy with a greater degree of dysfunction in the right hemisphere." Given above stated medical status, patient is not appropriate for a clinical swallow evaluation at this time.     Recommendations:  1) Refer to MD for GOC discussion and consider non-oral nutrition/hydration/medication at this time  2) Aspiration Precautions  3) Meticulous oral care to minimize risk for oral pathogens and associated aspiration pneumonia  4) SLP intervention deemed no longer clinically appropriate at this time; MD to reconsult pending improvement in neurological status as well as ability to tolerate nasal cannula >30 minutes, with SpO2 >90% and comfortable RR. SLP follow up as per plan. Patient continues on non-rebreather. As per neurology, EEG done yesterday showing "evidence of subclinical electrographic seizures arising from the right frontal region. Up to 10 seizures per hour were recorded, with no associated clinical signs.  Seizure duration is 30-50 seconds.  There is also evidence of a moderate diffuse encephalopathy with a greater degree of dysfunction in the right hemisphere." Given above stated medical status, patient is not appropriate for a clinical swallow evaluation at this time.     Recommendations:  1) Refer to MD for GOC discussion and consider non-oral nutrition/hydration/medication at this time  2) Aspiration Precautions  3) Meticulous oral care to minimize risk for oral pathogens and associated aspiration pneumonia  4) SLP intervention deemed no longer clinically appropriate at this time; MD to reconsult pending improvement in neurological status as well as ability to tolerate nasal cannula >30 minutes, with SpO2 >90% and comfortable RR.    Attempted to reach PA via pager #[phone]; awaiting call back.

## 2017-12-21 NOTE — EEG REPORT - NS EEG TEXT BOX
History:  -Concern for Seizure     Medication	  No Data.	    Interpretation:    17-  Startin2017-2017    Daily EEG Visual Analysis  FINDINGS:  The background was continuous, variable, poorly reactive and disorganized. There was no wakefulness recorded and no discernible posterior dominant rhythm. The background consisted of continuous diffuse theta and polymorphic delta activity.     Generalized slowing:   - continuous slowing, generalized and lateralized, right hemisphere  Focal slowing: none    Sleep Background:  Drowsiness was characterized by fragmentation, attenuation, and slowing of the background activity.      Other Paroxysmal Activity:  None     Interictal Epileptiform Activity:   Abundant Sharp Waves, Regional, Right Frontal, Maximum Fp2    Ictal Epileptiform Activity or Events:  Clinical Seizures: No clinical signs  EEG Seizures: Regional, Right Frontal (up to 10-15 per hour)  Description of EEG Seizures: rhythmic alpha activity develops at Fp2 and spreads to F4/F8/C4, and then evolves into rhythmic delta activity over the right frontocentral region.  Duration 30-90seconds    Activation Procedures:   -Hyperventilation was not performed.    -Photic stimulation was not performed.    Artifacts:  Intermittent myogenic and movement artifacts were noted.    ECG:  The heart rate on single channel ECG was predominantly between  BPM.    EEG Classification / Summary:  Abnormal EEG in the drowsy state  -Clinical Seizures: No clinical signs  -EEG Seizures: Regional, Right Frontal    -Sharp Waves, Regional, Right Frontal  -Continuous slowing, Generalized and Lateralized, right hemisphere     Clinical Impression:  This EEG provides evidence of subclinical electrographic seizures arising from the right frontal region.  Up to 15 seizures per hour were recorded, with no associated clinical signs.  Seizure duration is 30-90 seconds.  There is also evidence of a moderate diffuse encephalopathy with a greater degree of dysfunction in the right hemisphere.     Neurology Consult Team Notified      Hallie Ireland DO   Fellow in Neurophysiology/Epilepsy, [facility]      Edenilson Talbot MD  Attending Physician, [facility] [place]

## 2017-12-21 NOTE — PROGRESS NOTE ADULT - PROBLEM SELECTOR PLAN 3
nursing originally concerned for dysphagia, noting coughing with PO intake, requiring suctioning, c/w oral care  - previously on dysphagia 1 diet with nectar thick liquids  - as per d/w step-daughter, was NPO pending formal swallow eval, with plan to readdress nutritional status after recommendations made, (however now swallow eval will have to wait improvement in clinical status)  - given current clinical status with continued seizures and lethargy, PO intake is not safe at this point...  - on IV fluids as above

## 2017-12-21 NOTE — PROGRESS NOTE ADULT - ASSESSMENT
77 year old man with unknown PMH BIBEMS found outside shoveling snow soaking wet by neighbors, brought to ED, where he had 2 witnessed seizures, treated w/ Ativan and started on keppra, dose increased to 750 mg BID given 2 noted breakthrough seizures on 12/18. 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     Plan:    -Will follow EEG result  -c/w keppra 1500mg bid  -c/w phosphenytoin 100 TID  -Dilantin level now and in am

## 2017-12-21 NOTE — PROGRESS NOTE ADULT - PROBLEM SELECTOR PLAN 1
- still having seizures (seen on vEEG) despite increase in keppra dose to 1500 mg bid, fosphenytoin load and fosphenytoin 100 mg tid  - d/w neuro who is reviewing case with epilepsy team, will add vimpat to regimen, alternative would be use of benzos but that may increase lethargy and necessitate - still having seizures (seen on vEEG) despite increase in keppra dose to 1500 mg bid, fosphenytoin load and fosphenytoin 100 mg tid  - d/w neuro who is reviewing case with epilepsy team, will add vimpat to regimen, alternative would be use of benzos but that may increase lethargy and subsequently necessitate ICU evaluation...  - continue to monitor vEEG, monitor respiratory status/airway  - EEG with  focal dysfunction in the right anterior temporal region that is potentially epileptogenic, suggestive of a possible structural abnormality  -MRI with nohemy to eval for structural lesion pending clinical stability, Consent obtained by PA, in chart, will need to attempt imaging when patient more stable  - appreciate neuro input  - also d/w ELLIOTT

## 2017-12-21 NOTE — CHART NOTE - NSCHARTNOTEFT_GEN_A_CORE
Case discussed with epilepsy fellow  Patient still having seizures     Recommend giving extra 100mg  phosphenytoin and increase standing to 125mg tid  -ativan 1mg q4h  -Consider Micu consult if patient becomes lethargic Case discussed with epilepsy fellow  Patient still having seizures     Recommend   continue with keppra 1500mg bid  phosphentoin 100mg tid  Add vimpat 300mg iv x1 then start 150mg bid

## 2017-12-21 NOTE — PROGRESS NOTE ADULT - SUBJECTIVE AND OBJECTIVE BOX
Neurology Progress Note        Still lethargic with episodic left foot shaking      Objective  Vital Signs Last 24 Hrs  T(C): 37.1 (18 Dec 2017 05:02), Max: 37.6 (17 Dec 2017 20:16)  T(F): 98.8 (18 Dec 2017 05:02), Max: 99.7 (17 Dec 2017 20:16)  HR: 69 (18 Dec 2017 05:02) (65 - 72)  BP: 148/76 (18 Dec 2017 05:02) (133/70 - 148/76)  RR: 18 (18 Dec 2017 05:02) (17 - 19)  SpO2: 96% (18 Dec 2017 05:02) (96% - 97%)    Neurological Exam  Mental Status: Not awake not alert    Cranial Nerves: eyes closed peerl    Motor:   Tone:   Slightly increased tone in LLE            Strength:  no spontaneous movment    Intermittent myoclonus noted of left lower extremity    Deep Tendon Reflexes: 3+ LUE biceps, triceps, brachioradilis, patella. 2+ RUE/RLE    MEDICATIONS  (STANDING):  amLODIPine   Tablet 5 milliGRAM(s) Oral daily  dextrose 5% + sodium chloride 0.9%. 1000 milliLiter(s) (75 mL/Hr) IV Continuous <Continuous>  fosphenytoin IVPB 100 milliGRAM(s) PE IV Intermittent every 8 hours  heparin  Injectable 5000 Unit(s) SubCutaneous every 12 hours  levETIRAcetam  IVPB 1500 milliGRAM(s) IV Intermittent every 12 hours  multivitamin 1 Tablet(s) Oral daily  QUEtiapine 25 milliGRAM(s) Oral at bedtime  sodium chloride 0.9% lock flush 3 milliLiter(s) IV Push every 8 hours    MEDICATIONS  (PRN):  glycopyrrolate Injectable 0.2 milliGRAM(s) IV Push every 6 hours PRN chest congestion  OLANZapine Injectable 1.25 milliGRAM(s) IntraMuscular every 6 hours PRN Severe agitation  QUEtiapine 25 milliGRAM(s) Oral every 6 hours PRN Agitation  sodium chloride 0.65% Nasal 1 Spray(s) Both Nostrils four times a day PRN Congestion    Other Studies          EEG Classification / Summary:  Abnormal EEG in the drowsy state  -Clinical Seizures: No clinical signs  -EEG Seizures: Regional, Right Frontal    -Sharp Waves, Regional, Right Frontal  -Continuous slowing, Generalized and Lateralized, right hemisphere     Clinical Impression:  This EEG provides evidence of subclinical electrographic seizures arising from the right frontal region.  Up to 10 seizures per hour were recorded, with no associated clinical signs.  Seizure duration is 30-50 seconds.  There is also evidence of a moderate diffuse encephalopathy with a greater degree of dysfunction in the right hemisphere.  Case discussed with neuro team.

## 2017-12-21 NOTE — PROGRESS NOTE ADULT - ATTENDING COMMENTS
Dispo: needs better control of seizures and eventual addressing of nutritional status, MRI to be reattempted when patient more stable    PT recs rehab, family declining SNF, planning to take patient home after rehab with private hire per care coordination note, Dispo: needs better control of seizures and eventual addressing of nutritional status, MRI to be reattempted when patient more stable    Overall trajectory may be worsening as continues to have seizures despite increase in anti-epileptic medications, is lethargic with hypernatremia. May ultimately require ICU care if seizures/respiratory status worsens/concern for ability to protect airway...    Chaplaincy consulted per request of stepdaughter.

## 2017-12-21 NOTE — PROGRESS NOTE ADULT - ASSESSMENT
78 yo M with dementia (unclear baseline), p/w episode of obtundation with course c/b seizures x 2 in the ED that resolved with Ativan. 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 Seizures persisting despite increases to anti-epileptic regimen... MRI to evaluate for structural lesions pending clinical stability.

## 2017-12-22 DIAGNOSIS — R50.9 FEVER, UNSPECIFIED: ICD-10-CM

## 2017-12-22 LAB
APPEARANCE UR: CLEAR — SIGNIFICANT CHANGE UP
B PERT DNA SPEC QL NAA+PROBE: SIGNIFICANT CHANGE UP
BILIRUB UR-MCNC: NEGATIVE — SIGNIFICANT CHANGE UP
BLOOD UR QL VISUAL: HIGH
BUN SERPL-MCNC: 22 MG/DL — SIGNIFICANT CHANGE UP (ref 7–23)
C PNEUM DNA SPEC QL NAA+PROBE: NOT DETECTED — SIGNIFICANT CHANGE UP
CALCIUM SERPL-MCNC: 9 MG/DL — SIGNIFICANT CHANGE UP (ref 8.4–10.5)
CHLORIDE SERPL-SCNC: 107 MMOL/L — SIGNIFICANT CHANGE UP (ref 98–107)
CO2 SERPL-SCNC: 22 MMOL/L — SIGNIFICANT CHANGE UP (ref 22–31)
COLOR SPEC: YELLOW — SIGNIFICANT CHANGE UP
CREAT SERPL-MCNC: 1.18 MG/DL — SIGNIFICANT CHANGE UP (ref 0.5–1.3)
FLUAV H1 2009 PAND RNA SPEC QL NAA+PROBE: NOT DETECTED — SIGNIFICANT CHANGE UP
FLUAV H1 RNA SPEC QL NAA+PROBE: NOT DETECTED — SIGNIFICANT CHANGE UP
FLUAV H3 RNA SPEC QL NAA+PROBE: NOT DETECTED — SIGNIFICANT CHANGE UP
FLUAV SUBTYP SPEC NAA+PROBE: SIGNIFICANT CHANGE UP
FLUBV RNA SPEC QL NAA+PROBE: NOT DETECTED — SIGNIFICANT CHANGE UP
GLUCOSE SERPL-MCNC: 225 MG/DL — HIGH (ref 70–99)
GLUCOSE UR-MCNC: NEGATIVE — SIGNIFICANT CHANGE UP
HADV DNA SPEC QL NAA+PROBE: NOT DETECTED — SIGNIFICANT CHANGE UP
HCOV 229E RNA SPEC QL NAA+PROBE: NOT DETECTED — SIGNIFICANT CHANGE UP
HCOV HKU1 RNA SPEC QL NAA+PROBE: NOT DETECTED — SIGNIFICANT CHANGE UP
HCOV NL63 RNA SPEC QL NAA+PROBE: NOT DETECTED — SIGNIFICANT CHANGE UP
HCOV OC43 RNA SPEC QL NAA+PROBE: NOT DETECTED — SIGNIFICANT CHANGE UP
HCT VFR BLD CALC: 37.6 % — LOW (ref 39–50)
HGB BLD-MCNC: 11.9 G/DL — LOW (ref 13–17)
HMPV RNA SPEC QL NAA+PROBE: NOT DETECTED — SIGNIFICANT CHANGE UP
HPIV1 RNA SPEC QL NAA+PROBE: NOT DETECTED — SIGNIFICANT CHANGE UP
HPIV2 RNA SPEC QL NAA+PROBE: NOT DETECTED — SIGNIFICANT CHANGE UP
HPIV3 RNA SPEC QL NAA+PROBE: NOT DETECTED — SIGNIFICANT CHANGE UP
HPIV4 RNA SPEC QL NAA+PROBE: NOT DETECTED — SIGNIFICANT CHANGE UP
KETONES UR-MCNC: NEGATIVE — SIGNIFICANT CHANGE UP
LEUKOCYTE ESTERASE UR-ACNC: NEGATIVE — SIGNIFICANT CHANGE UP
M PNEUMO DNA SPEC QL NAA+PROBE: NOT DETECTED — SIGNIFICANT CHANGE UP
MAGNESIUM SERPL-MCNC: 2.1 MG/DL — SIGNIFICANT CHANGE UP (ref 1.6–2.6)
MCHC RBC-ENTMCNC: 29.5 PG — SIGNIFICANT CHANGE UP (ref 27–34)
MCHC RBC-ENTMCNC: 31.6 % — LOW (ref 32–36)
MCV RBC AUTO: 93.3 FL — SIGNIFICANT CHANGE UP (ref 80–100)
MUCOUS THREADS # UR AUTO: SIGNIFICANT CHANGE UP
NITRITE UR-MCNC: NEGATIVE — SIGNIFICANT CHANGE UP
NRBC # FLD: 0 — SIGNIFICANT CHANGE UP
PH UR: 7 — SIGNIFICANT CHANGE UP (ref 4.6–8)
PHENYTOIN FREE SERPL-MCNC: 13 UG/ML — SIGNIFICANT CHANGE UP (ref 10–20)
PLATELET # BLD AUTO: 336 K/UL — SIGNIFICANT CHANGE UP (ref 150–400)
PMV BLD: 9.7 FL — SIGNIFICANT CHANGE UP (ref 7–13)
POTASSIUM SERPL-MCNC: 4.1 MMOL/L — SIGNIFICANT CHANGE UP (ref 3.5–5.3)
POTASSIUM SERPL-SCNC: 4.1 MMOL/L — SIGNIFICANT CHANGE UP (ref 3.5–5.3)
PROT UR-MCNC: 300 MG/DL — SIGNIFICANT CHANGE UP
RBC # BLD: 4.03 M/UL — LOW (ref 4.2–5.8)
RBC # FLD: 14.6 % — HIGH (ref 10.3–14.5)
RBC CASTS # UR COMP ASSIST: HIGH (ref 0–?)
RSV RNA SPEC QL NAA+PROBE: NOT DETECTED — SIGNIFICANT CHANGE UP
RV+EV RNA SPEC QL NAA+PROBE: NOT DETECTED — SIGNIFICANT CHANGE UP
SODIUM SERPL-SCNC: 143 MMOL/L — SIGNIFICANT CHANGE UP (ref 135–145)
SP GR SPEC: 1.02 — SIGNIFICANT CHANGE UP (ref 1–1.04)
UROBILINOGEN FLD QL: 4 MG/DL — HIGH
WBC # BLD: 11.27 K/UL — HIGH (ref 3.8–10.5)
WBC # FLD AUTO: 11.27 K/UL — HIGH (ref 3.8–10.5)
WBC UR QL: SIGNIFICANT CHANGE UP (ref 0–?)

## 2017-12-22 PROCEDURE — 95951: CPT | Mod: 26

## 2017-12-22 PROCEDURE — 99233 SBSQ HOSP IP/OBS HIGH 50: CPT

## 2017-12-22 PROCEDURE — 99497 ADVNCD CARE PLAN 30 MIN: CPT

## 2017-12-22 RX ORDER — LACOSAMIDE 50 MG/1
TABLET ORAL
Qty: 0 | Refills: 0 | Status: DISCONTINUED | OUTPATIENT
Start: 2017-12-22 | End: 2017-12-28

## 2017-12-22 RX ORDER — LACOSAMIDE 50 MG/1
50 TABLET ORAL ONCE
Qty: 0 | Refills: 0 | Status: DISCONTINUED | OUTPATIENT
Start: 2017-12-22 | End: 2017-12-22

## 2017-12-22 RX ORDER — ACETAMINOPHEN 500 MG
650 TABLET ORAL ONCE
Qty: 0 | Refills: 0 | Status: COMPLETED | OUTPATIENT
Start: 2017-12-22 | End: 2017-12-22

## 2017-12-22 RX ORDER — ACETAMINOPHEN 500 MG
650 TABLET ORAL EVERY 6 HOURS
Qty: 0 | Refills: 0 | Status: DISCONTINUED | OUTPATIENT
Start: 2017-12-22 | End: 2018-01-18

## 2017-12-22 RX ORDER — LACOSAMIDE 50 MG/1
TABLET ORAL
Qty: 0 | Refills: 0 | Status: DISCONTINUED | OUTPATIENT
Start: 2017-12-22 | End: 2017-12-22

## 2017-12-22 RX ORDER — SODIUM CHLORIDE 9 MG/ML
1000 INJECTION, SOLUTION INTRAVENOUS
Qty: 0 | Refills: 0 | Status: DISCONTINUED | OUTPATIENT
Start: 2017-12-22 | End: 2017-12-26

## 2017-12-22 RX ORDER — LACOSAMIDE 50 MG/1
200 TABLET ORAL EVERY 12 HOURS
Qty: 0 | Refills: 0 | Status: DISCONTINUED | OUTPATIENT
Start: 2017-12-23 | End: 2017-12-28

## 2017-12-22 RX ADMIN — LEVETIRACETAM 400 MILLIGRAM(S): 250 TABLET, FILM COATED ORAL at 18:27

## 2017-12-22 RX ADMIN — HEPARIN SODIUM 5000 UNIT(S): 5000 INJECTION INTRAVENOUS; SUBCUTANEOUS at 05:30

## 2017-12-22 RX ADMIN — SODIUM CHLORIDE 50 MILLILITER(S): 9 INJECTION, SOLUTION INTRAVENOUS at 16:40

## 2017-12-22 RX ADMIN — SODIUM CHLORIDE 3 MILLILITER(S): 9 INJECTION INTRAMUSCULAR; INTRAVENOUS; SUBCUTANEOUS at 22:30

## 2017-12-22 RX ADMIN — ROBINUL 0.2 MILLIGRAM(S): 0.2 INJECTION INTRAMUSCULAR; INTRAVENOUS at 05:29

## 2017-12-22 RX ADMIN — LACOSAMIDE 110 MILLIGRAM(S): 50 TABLET ORAL at 17:05

## 2017-12-22 RX ADMIN — LEVETIRACETAM 400 MILLIGRAM(S): 250 TABLET, FILM COATED ORAL at 05:29

## 2017-12-22 RX ADMIN — FOSPHENYTOIN 104 MILLIGRAM(S) PE: 50 INJECTION INTRAMUSCULAR; INTRAVENOUS at 22:33

## 2017-12-22 RX ADMIN — HEPARIN SODIUM 5000 UNIT(S): 5000 INJECTION INTRAVENOUS; SUBCUTANEOUS at 18:27

## 2017-12-22 RX ADMIN — Medication 650 MILLIGRAM(S): at 22:18

## 2017-12-22 RX ADMIN — FOSPHENYTOIN 104 MILLIGRAM(S) PE: 50 INJECTION INTRAMUSCULAR; INTRAVENOUS at 14:32

## 2017-12-22 RX ADMIN — LACOSAMIDE 130 MILLIGRAM(S): 50 TABLET ORAL at 06:09

## 2017-12-22 RX ADMIN — SODIUM CHLORIDE 3 MILLILITER(S): 9 INJECTION INTRAMUSCULAR; INTRAVENOUS; SUBCUTANEOUS at 05:30

## 2017-12-22 RX ADMIN — SODIUM CHLORIDE 3 MILLILITER(S): 9 INJECTION INTRAMUSCULAR; INTRAVENOUS; SUBCUTANEOUS at 13:07

## 2017-12-22 RX ADMIN — FOSPHENYTOIN 104 MILLIGRAM(S) PE: 50 INJECTION INTRAMUSCULAR; INTRAVENOUS at 05:29

## 2017-12-22 NOTE — PROGRESS NOTE ADULT - ASSESSMENT
77 year old man with unknown PMH BIBEMS found outside shoveling snow soaking wet by neighbors, brought to ED, where he had 2 witnessed seizures, treated w/ Ativan and started on keppra, dose increased to 750 mg BID given 2 noted breakthrough seizures on 12/18. 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     Plan:    -Will follow EEG result  -c/w keppra 1500mg bid  -c/w phosphenytoin 100 TID  -Dilantin level now and in am 77 year old man with unknown PMH BIBEMS found outside shoveling snow soaking wet by neighbors, brought to ED, where he had 2 witnessed seizures, treated w/ Ativan and started on keppra, dose increased to 750 mg BID given 2 noted breakthrough seizures on 12/18. 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     Plan:    -Will follow EEG result  -c/w keppra 1500mg bid  -c/w phosphenytoin 100 TID  vimpat 150 BID 77 year old man with unknown PMH BIBEMS found outside shoveling snow soaking wet by neighbors, brought to ED, where he had 2 witnessed seizures, treated w/ Ativan and started on keppra, dose increased to 750 mg BID given 2 noted breakthrough seizures on 12/18. 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     Plan:    -Will follow EEG result  -c/w keppra 1500mg bid  -c/w phosphenytoin 100 TID  -c/w vimpat 150 BID

## 2017-12-22 NOTE — PROGRESS NOTE ADULT - SUBJECTIVE AND OBJECTIVE BOX
Patient is a 77y old  Male who presents with a chief complaint of Seizure x 1 day (16 Dec 2017 01:26)      INTERVAL HPI/OVERNIGHT EVENTS:    MEDICATIONS  (STANDING):  amLODIPine   Tablet 5 milliGRAM(s) Oral daily  dextrose 5%. 1000 milliLiter(s) (75 mL/Hr) IV Continuous <Continuous>  fosphenytoin IVPB 100 milliGRAM(s) PE IV Intermittent every 8 hours  heparin  Injectable 5000 Unit(s) SubCutaneous every 12 hours  lacosamide IVPB      lacosamide IVPB 150 milliGRAM(s) IV Intermittent every 12 hours  levETIRAcetam  IVPB 1500 milliGRAM(s) IV Intermittent every 12 hours  multivitamin 1 Tablet(s) Oral daily  QUEtiapine 25 milliGRAM(s) Oral at bedtime  sodium chloride 0.9% lock flush 3 milliLiter(s) IV Push every 8 hours    MEDICATIONS  (PRN):  glycopyrrolate Injectable 0.2 milliGRAM(s) IV Push every 6 hours PRN chest congestion  OLANZapine Injectable 1.25 milliGRAM(s) IntraMuscular every 6 hours PRN Severe agitation  QUEtiapine 25 milliGRAM(s) Oral every 6 hours PRN Agitation  sodium chloride 0.65% Nasal 1 Spray(s) Both Nostrils four times a day PRN Congestion      Allergies    No Known Allergies    Intolerances        REVIEW OF SYSTEMS:  Please see interval HPI:    Vital Signs Last 24 Hrs  T(C): 38.3 (22 Dec 2017 09:32), Max: 38.3 (22 Dec 2017 09:32)  T(F): 101 (22 Dec 2017 09:32), Max: 101 (22 Dec 2017 09:32)  HR: 82 (22 Dec 2017 09:32) (66 - 82)  BP: 114/65 (22 Dec 2017 09:32) (114/65 - 137/70)  BP(mean): --  RR: 18 (22 Dec 2017 09:32) (18 - 18)  SpO2: 99% (22 Dec 2017 09:32) (97% - 100%)  I&O's Detail        PHYSICAL EXAM:  GENERAL:   HEAD:    EYES:   ENMT:   NECK:   NERVOUS SYSTEM:    CHEST/LUNG:   HEART:   ABDOMEN:   EXTREMITIES:    LYMPH:   SKIN:     LABS:                        11.9   11.27 )-----------( 336      ( 22 Dec 2017 06:30 )             37.6     22 Dec 2017 06:30    143    |  107    |  22     ----------------------------<  225    4.1     |  22     |  1.18     Ca    9.0        22 Dec 2017 06:30  Mg     2.1       22 Dec 2017 06:30        CAPILLARY BLOOD GLUCOSE        BLOOD CULTURE    RADIOLOGY & ADDITIONAL TESTS:    Imaging Personally Reviewed:  [ ] YES     Consultant(s) Notes Reviewed:      Care Discussed with Consultants/Other Providers: Patient is a 77y old  Male who presents with a chief complaint of Seizure x 1 day (16 Dec 2017 01:26)    INTERVAL HPI/OVERNIGHT EVENTS:  Patient cont    MEDICATIONS  (STANDING):  amLODIPine   Tablet 5 milliGRAM(s) Oral daily  dextrose 5%. 1000 milliLiter(s) (75 mL/Hr) IV Continuous <Continuous>  fosphenytoin IVPB 100 milliGRAM(s) PE IV Intermittent every 8 hours  heparin  Injectable 5000 Unit(s) SubCutaneous every 12 hours  lacosamide IVPB      lacosamide IVPB 150 milliGRAM(s) IV Intermittent every 12 hours  levETIRAcetam  IVPB 1500 milliGRAM(s) IV Intermittent every 12 hours  multivitamin 1 Tablet(s) Oral daily  QUEtiapine 25 milliGRAM(s) Oral at bedtime  sodium chloride 0.9% lock flush 3 milliLiter(s) IV Push every 8 hours    MEDICATIONS  (PRN):  glycopyrrolate Injectable 0.2 milliGRAM(s) IV Push every 6 hours PRN chest congestion  OLANZapine Injectable 1.25 milliGRAM(s) IntraMuscular every 6 hours PRN Severe agitation  QUEtiapine 25 milliGRAM(s) Oral every 6 hours PRN Agitation  sodium chloride 0.65% Nasal 1 Spray(s) Both Nostrils four times a day PRN Congestion      Allergies    No Known Allergies    Intolerances        REVIEW OF SYSTEMS:  Please see interval HPI:    Vital Signs Last 24 Hrs  T(C): 38.3 (22 Dec 2017 09:32), Max: 38.3 (22 Dec 2017 09:32)  T(F): 101 (22 Dec 2017 09:32), Max: 101 (22 Dec 2017 09:32)  HR: 82 (22 Dec 2017 09:32) (66 - 82)  BP: 114/65 (22 Dec 2017 09:32) (114/65 - 137/70)  BP(mean): --  RR: 18 (22 Dec 2017 09:32) (18 - 18)  SpO2: 99% (22 Dec 2017 09:32) (97% - 100%)  I&O's Detail        PHYSICAL EXAM:  GENERAL:   HEAD:    EYES:   ENMT:   NECK:   NERVOUS SYSTEM:    CHEST/LUNG:   HEART:   ABDOMEN:   EXTREMITIES:    LYMPH:   SKIN:     LABS:                        11.9   11.27 )-----------( 336      ( 22 Dec 2017 06:30 )             37.6     22 Dec 2017 06:30    143    |  107    |  22     ----------------------------<  225    4.1     |  22     |  1.18     Ca    9.0        22 Dec 2017 06:30  Mg     2.1       22 Dec 2017 06:30        CAPILLARY BLOOD GLUCOSE        BLOOD CULTURE    RADIOLOGY & ADDITIONAL TESTS:    Imaging Personally Reviewed:  [ ] YES     Consultant(s) Notes Reviewed:      Care Discussed with Consultants/Other Providers: Patient is a 77y old  Male who presents with a chief complaint of Seizure x 1 day (16 Dec 2017 01:26)    INTERVAL HPI/OVERNIGHT EVENTS:  Patient continues to have seizures (though through discussion with neurology the frequency of seizures has decreased), still lethargic, not following commands, not responding to voice. Having fever.     MEDICATIONS  (STANDING):  amLODIPine   Tablet 5 milliGRAM(s) Oral daily  dextrose 5%. 1000 milliLiter(s) (75 mL/Hr) IV Continuous <Continuous>  fosphenytoin IVPB 100 milliGRAM(s) PE IV Intermittent every 8 hours  heparin  Injectable 5000 Unit(s) SubCutaneous every 12 hours  lacosamide IVPB      lacosamide IVPB 150 milliGRAM(s) IV Intermittent every 12 hours  levETIRAcetam  IVPB 1500 milliGRAM(s) IV Intermittent every 12 hours  multivitamin 1 Tablet(s) Oral daily  QUEtiapine 25 milliGRAM(s) Oral at bedtime  sodium chloride 0.9% lock flush 3 milliLiter(s) IV Push every 8 hours    MEDICATIONS  (PRN):  glycopyrrolate Injectable 0.2 milliGRAM(s) IV Push every 6 hours PRN chest congestion  OLANZapine Injectable 1.25 milliGRAM(s) IntraMuscular every 6 hours PRN Severe agitation  QUEtiapine 25 milliGRAM(s) Oral every 6 hours PRN Agitation  sodium chloride 0.65% Nasal 1 Spray(s) Both Nostrils four times a day PRN Congestion    Allergies  No Known Allergies    Intolerances    REVIEW OF SYSTEMS:  Please see interval HPI:    Vital Signs Last 24 Hrs  T(C): 38.3 (22 Dec 2017 09:32), Max: 38.3 (22 Dec 2017 09:32)  T(F): 101 (22 Dec 2017 09:32), Max: 101 (22 Dec 2017 09:32)  HR: 82 (22 Dec 2017 09:32) (66 - 82)  BP: 114/65 (22 Dec 2017 09:32) (114/65 - 137/70)  BP(mean): --  RR: 18 (22 Dec 2017 09:32) (18 - 18)  SpO2: 99% (22 Dec 2017 09:32) (97% - 100%)  I&O's Detail    PHYSICAL EXAM:  GENERAL: Lethargic, lying in bed, feels warm to touch  HEAD:  VEEG in place, slight temporal wasting  ENMT: NRB in place  NERVOUS SYSTEM:  not responding to voice, not following commands, occasionally moving RUE  CHEST/LUNG: coarse upper airway sounds  HEART: S1S2 RRR  ABDOMEN: soft, non-distended  EXTREMITIES:  no c/c, no LE edema, hands appear more swollen today    LABS:             11.9   11.27 )-----------( 336      ( 22 Dec 2017 06:30 )             37.6     22 Dec 2017 06:30    143    |  107    |  22     ----------------------------<  225    4.1     |  22     |  1.18     Ca    9.0        22 Dec 2017 06:30  Mg     2.1       22 Dec 2017 06:30    CAPILLARY BLOOD GLUCOSE    BLOOD CULTURE    RADIOLOGY & ADDITIONAL TESTS:  vEEG: This EEG provides evidence of subclinical electrographic seizures arising from the right frontal region.  Seizure duration is 30-60 seconds.  After 2AM, only 5 seizures were recorded, which is an improvement from prior day’s recording. There is also evidence of a moderate diffuse encephalopathy with a greater degree of dysfunction in the right hemisphere.     Imaging Personally Reviewed:  [ ] YES     Consultant(s) Notes Reviewed:      Care Discussed with Consultants/Other Providers: Neuro, Palliative

## 2017-12-22 NOTE — PROGRESS NOTE ADULT - PROBLEM SELECTOR PLAN 3
Na 149 yesterday (setting of decreased PO intake), with estimated free water deficit 2.2 L, now improving, Na 143 today, will c/w IV fluids at lower rate. Continue to monitor Na

## 2017-12-22 NOTE — CHART NOTE - NSCHARTNOTEFT_GEN_A_CORE
EEG shows improvement with five seizures since 2am. Patient spiked a fever this morning  -increase vimpat to 200mg bid  -Regular infectious workup, if negative will consider LP

## 2017-12-22 NOTE — PROGRESS NOTE ADULT - ATTENDING COMMENTS
Pt remains unresponsive and stuporous. Now being treated with Keppra 1500 BID,  QD, Vimpat 150 BID with last DPH level of 13.  No witnessed left LE focal seizures on todays exam. VEEG not running at present.  Focal status appears to be under control.  Awaiting results of VEEG monitoring from yesterday.  D/C all psychotropic meds.

## 2017-12-22 NOTE — EEG REPORT - NS EEG TEXT BOX
History:  -Concern for Seizures     Medication	  No Data.	    Interpretation:    17-  Startin2017-2017    Daily EEG Visual Analysis  FINDINGS:  The background was continuous, variable, reactive and disorganized. The best posterior dominant rhythm was 7.5Hz. The background consisted of continuous diffuse theta and polymorphic delta activity.     Generalized slowing:   Continuous slowing, generalized and lateralized, right hemisphere  Focal slowing: none    Sleep Background:  Drowsiness was characterized by fragmentation, attenuation, and slowing of the background activity.      Other Paroxysmal Activity:  None     Interictal Epileptiform Activity:   Sharp Waves, Regional, Right Fronto-central, Maximum Fp2  Sharp Waves, Regional, Right Parietal, Maximum P4    Ictal Epileptiform Activity or Events:  Clinical Seizures: No clinical signs  EEG Seizures: Regional, Right Frontal (up to 10 per hour until approximately 22:00; 5 seizures after 2AM )  Description of EEG Seizures: rhythmic alpha activity develops at Fp2 and spreads to F4/F8/C4, and then evolves into rhythmic delta activity over the right frontocentral region.  Duration 30-60seconds    Activation Procedures:   -Hyperventilation was not performed.    -Photic stimulation was not performed.    Artifacts:  Intermittent myogenic and movement artifacts were noted.    ECG:  The heart rate on single channel ECG was predominantly between  BPM.    EEG Classification / Summary:  Abnormal EEG in the drowsy state  -Clinical Seizures: No clinical signs  -EEG Seizures: Regional, Right Frontal    -Sharp Waves, Regional, Right Fronto-central, Maximum Fp2  Sharp Waves, Regional, Right Parietal, Maximum P4  -Continuous slowing, Generalized and Lateralized, right hemisphere     Clinical Impression:  This EEG provides evidence of subclinical electrographic seizures arising from the right frontal region.  Seizure duration is 30-60 seconds.  After 2AM, only 5 seizures were recorded, which is an improvement from prior day’s recording. There is also evidence of a moderate diffuse encephalopathy with a greater degree of dysfunction in the right hemisphere.     Neurology Consult Team Notified      Hallie Ireland DO   Fellow in Neurophysiology/Epilepsy, [facility]      Edenilson Talbot MD  Attending Physician, [facility]

## 2017-12-22 NOTE — PROGRESS NOTE ADULT - ASSESSMENT
78 yo M with dementia (unclear baseline), p/w episode of obtundation with course c/b seizures x 2 in the ED that initially resolved with Ativan. 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 Seizures persisting despite increases to anti-epileptic regimen... MRI to evaluate for structural lesions pending clinical stability. Course c/b fever.

## 2017-12-22 NOTE — CHART NOTE - NSCHARTNOTEFT_GEN_A_CORE
I met with (stepdaughter and surrogate decision maker) Chelita at bedside to discuss advanced directives. I updated her on the hospital course and ongoing medical issues. Based on her knowledge of the patient's wishes, he would not want to be put onto a mechanical ventilator, he would not want to undergo cardiopulmonary resuscitation (and have ribs broken, have unnecessary pain and suffering etc). We filled out a DNR form (placed in chart, order placed in computer). Her preference would be to transfer him to [place] (hospice facility) as it is close to the [place] where she lives. She is okay with using medications to control the seizures, she would want antibiotics to treat a bacterial infection if one was found.     Time spent: 35 minutes

## 2017-12-22 NOTE — PROGRESS NOTE ADULT - PROBLEM SELECTOR PLAN 5
c/w supportive care, unable to care for himself adequately  - appreciate psych assistance  - patient does not have capacity to participate in discharge planning  - seroquel discontinued (avoiding sedatives as per neuro, patient has not been able to take po meds in this period anyway)

## 2017-12-22 NOTE — PROGRESS NOTE ADULT - PROBLEM SELECTOR PLAN 2
- undergoing infectious workup, f/u Cx results  - monitor off antibiotics as no clear source yet...   - d/w neuro, they feel unlikely to see fevers as side effect of Vimpat  - they would consider possibility of LP if other infectious w/u negative

## 2017-12-22 NOTE — PROGRESS NOTE ADULT - ATTENDING COMMENTS
Dispo: needs better control of seizures and eventual addressing of nutritional status, MRI to be reattempted when patient more stable    Overall trajectory may be worsening as continues to have seizures despite increase in anti-epileptic medications, is lethargic/unresponsive with hypernatremia, now with fever today. May ultimately require ICU care if seizures/respiratory status worsens/concern for ability to protect airway...    Chaplaincy consulted per request of stepdaughter.    Stepdaughter Chelita (she is the surrogate) supposed to come in today to discuss further GOCs. Spoke with her earlier this AM. She would prefer patient to be transferred to [place] (inpt hopsice if that is ultimately what is required, she had sent mom there who passed from cancer, would be more convenient for Chelita as she lives in the [place]). Discussed case with palliative team, for now they will follow on the periphery, team can consider calling formal consult next week if required.

## 2017-12-22 NOTE — PROGRESS NOTE ADULT - SUBJECTIVE AND OBJECTIVE BOX
Neurology Progress Note        Still lethargic with episodic left foot shaking      Objective  Vital Signs Last 24 Hrs  T(C): 38.3 (22 Dec 2017 09:32), Max: 38.3 (22 Dec 2017 09:32)  T(F): 101 (22 Dec 2017 09:32), Max: 101 (22 Dec 2017 09:32)  HR: 82 (22 Dec 2017 09:32) (66 - 82)  BP: 114/65 (22 Dec 2017 09:32) (114/65 - 137/70)  BP(mean): --  RR: 18 (22 Dec 2017 09:32) (18 - 18)  SpO2: 99% (22 Dec 2017 09:32) (97% - 100%)    Neurological Exam  Mental Status: responds to pain not voice    Cranial Nerves: eyes closed peerl    Motor:   Tone:   Slightly increased tone in LLE            Strength:  no spontaneous movment    Intermittent myoclonus noted of left lower extremity    Deep Tendon Reflexes: 3+ LUE biceps, triceps, brachioradilis, patella. 2+ RUE/RLE    MEDICATIONS  (STANDING):  amLODIPine   Tablet 5 milliGRAM(s) Oral daily  dextrose 5%. 1000 milliLiter(s) (75 mL/Hr) IV Continuous <Continuous>  fosphenytoin IVPB 100 milliGRAM(s) PE IV Intermittent every 8 hours  heparin  Injectable 5000 Unit(s) SubCutaneous every 12 hours  lacosamide IVPB      lacosamide IVPB 150 milliGRAM(s) IV Intermittent every 12 hours  levETIRAcetam  IVPB 1500 milliGRAM(s) IV Intermittent every 12 hours  multivitamin 1 Tablet(s) Oral daily  QUEtiapine 25 milliGRAM(s) Oral at bedtime  sodium chloride 0.9% lock flush 3 milliLiter(s) IV Push every 8 hours    MEDICATIONS  (PRN):  glycopyrrolate Injectable 0.2 milliGRAM(s) IV Push every 6 hours PRN chest congestion  OLANZapine Injectable 1.25 milliGRAM(s) IntraMuscular every 6 hours PRN Severe agitation  QUEtiapine 25 milliGRAM(s) Oral every 6 hours PRN Agitation  sodium chloride 0.65% Nasal 1 Spray(s) Both Nostrils four times a day PRN Congestion      Other Studies        EEG Classification / Summary:  Abnormal EEG in the drowsy state  -Clinical Seizures: No clinical signs  -EEG Seizures: Regional, Right Frontal    -Sharp Waves, Regional, Right Frontal  -Continuous slowing, Generalized and Lateralized, right hemisphere     Clinical Impression:  This EEG provides evidence of subclinical electrographic seizures arising from the right frontal region.  Up to 15 seizures per hour were recorded, with no associated clinical signs.  Seizure duration is 30-90 seconds.  There is also evidence of a moderate diffuse encephalopathy with a greater degree of dysfunction in the right hemisphere. Neurology Progress Note        Still lethargic. No foot shaking on exam      Objective  Vital Signs Last 24 Hrs  T(C): 38.3 (22 Dec 2017 09:32), Max: 38.3 (22 Dec 2017 09:32)  T(F): 101 (22 Dec 2017 09:32), Max: 101 (22 Dec 2017 09:32)  HR: 82 (22 Dec 2017 09:32) (66 - 82)  BP: 114/65 (22 Dec 2017 09:32) (114/65 - 137/70)  BP(mean): --  RR: 18 (22 Dec 2017 09:32) (18 - 18)  SpO2: 99% (22 Dec 2017 09:32) (97% - 100%)    Neurological Exam  Mental Status: No response to voice or pain    Cranial Nerves: eyes closed peerl, corneals intact, VOR intact    Motor:   Tone:   flaccid throughout          Strength:  no spontaneous movement    Sens- no response to pain throughout  MEDICATIONS  (STANDING):  amLODIPine   Tablet 5 milliGRAM(s) Oral daily  dextrose 5%. 1000 milliLiter(s) (75 mL/Hr) IV Continuous <Continuous>  fosphenytoin IVPB 100 milliGRAM(s) PE IV Intermittent every 8 hours  heparin  Injectable 5000 Unit(s) SubCutaneous every 12 hours  lacosamide IVPB      lacosamide IVPB 150 milliGRAM(s) IV Intermittent every 12 hours  levETIRAcetam  IVPB 1500 milliGRAM(s) IV Intermittent every 12 hours  multivitamin 1 Tablet(s) Oral daily  QUEtiapine 25 milliGRAM(s) Oral at bedtime  sodium chloride 0.9% lock flush 3 milliLiter(s) IV Push every 8 hours    MEDICATIONS  (PRN):  glycopyrrolate Injectable 0.2 milliGRAM(s) IV Push every 6 hours PRN chest congestion  OLANZapine Injectable 1.25 milliGRAM(s) IntraMuscular every 6 hours PRN Severe agitation  QUEtiapine 25 milliGRAM(s) Oral every 6 hours PRN Agitation  sodium chloride 0.65% Nasal 1 Spray(s) Both Nostrils four times a day PRN Congestion      Other Studies        EEG Classification / Summary:  Abnormal EEG in the drowsy state  -Clinical Seizures: No clinical signs  -EEG Seizures: Regional, Right Frontal    -Sharp Waves, Regional, Right Frontal  -Continuous slowing, Generalized and Lateralized, right hemisphere     Clinical Impression:  This EEG provides evidence of subclinical electrographic seizures arising from the right frontal region.  Up to 15 seizures per hour were recorded, with no associated clinical signs.  Seizure duration is 30-90 seconds.  There is also evidence of a moderate diffuse encephalopathy with a greater degree of dysfunction in the right hemisphere.

## 2017-12-23 LAB
BUN SERPL-MCNC: 24 MG/DL — HIGH (ref 7–23)
CALCIUM SERPL-MCNC: 9 MG/DL — SIGNIFICANT CHANGE UP (ref 8.4–10.5)
CHLORIDE SERPL-SCNC: 103 MMOL/L — SIGNIFICANT CHANGE UP (ref 98–107)
CO2 SERPL-SCNC: 24 MMOL/L — SIGNIFICANT CHANGE UP (ref 22–31)
CREAT SERPL-MCNC: 1.14 MG/DL — SIGNIFICANT CHANGE UP (ref 0.5–1.3)
GLUCOSE SERPL-MCNC: 161 MG/DL — HIGH (ref 70–99)
HCT VFR BLD CALC: 36.2 % — LOW (ref 39–50)
HGB BLD-MCNC: 11.4 G/DL — LOW (ref 13–17)
MAGNESIUM SERPL-MCNC: 2.2 MG/DL — SIGNIFICANT CHANGE UP (ref 1.6–2.6)
MCHC RBC-ENTMCNC: 28.6 PG — SIGNIFICANT CHANGE UP (ref 27–34)
MCHC RBC-ENTMCNC: 31.5 % — LOW (ref 32–36)
MCV RBC AUTO: 91 FL — SIGNIFICANT CHANGE UP (ref 80–100)
NRBC # FLD: 0 — SIGNIFICANT CHANGE UP
PLATELET # BLD AUTO: 316 K/UL — SIGNIFICANT CHANGE UP (ref 150–400)
PMV BLD: 9.5 FL — SIGNIFICANT CHANGE UP (ref 7–13)
POTASSIUM SERPL-MCNC: 3.8 MMOL/L — SIGNIFICANT CHANGE UP (ref 3.5–5.3)
POTASSIUM SERPL-SCNC: 3.8 MMOL/L — SIGNIFICANT CHANGE UP (ref 3.5–5.3)
RBC # BLD: 3.98 M/UL — LOW (ref 4.2–5.8)
RBC # FLD: 14.5 % — SIGNIFICANT CHANGE UP (ref 10.3–14.5)
SODIUM SERPL-SCNC: 140 MMOL/L — SIGNIFICANT CHANGE UP (ref 135–145)
SPECIMEN SOURCE: SIGNIFICANT CHANGE UP
SPECIMEN SOURCE: SIGNIFICANT CHANGE UP
WBC # BLD: 14.83 K/UL — HIGH (ref 3.8–10.5)
WBC # FLD AUTO: 14.83 K/UL — HIGH (ref 3.8–10.5)

## 2017-12-23 PROCEDURE — 99233 SBSQ HOSP IP/OBS HIGH 50: CPT

## 2017-12-23 PROCEDURE — 95951: CPT | Mod: 26

## 2017-12-23 PROCEDURE — 71010: CPT | Mod: 26

## 2017-12-23 PROCEDURE — 99222 1ST HOSP IP/OBS MODERATE 55: CPT | Mod: GC

## 2017-12-23 RX ORDER — ACYCLOVIR SODIUM 500 MG
700 VIAL (EA) INTRAVENOUS EVERY 12 HOURS
Qty: 0 | Refills: 0 | Status: DISCONTINUED | OUTPATIENT
Start: 2017-12-24 | End: 2018-01-02

## 2017-12-23 RX ORDER — CEFTRIAXONE 500 MG/1
2 INJECTION, POWDER, FOR SOLUTION INTRAMUSCULAR; INTRAVENOUS ONCE
Qty: 0 | Refills: 0 | Status: COMPLETED | OUTPATIENT
Start: 2017-12-23 | End: 2017-12-23

## 2017-12-23 RX ORDER — MEROPENEM 1 G/30ML
INJECTION INTRAVENOUS
Qty: 0 | Refills: 0 | Status: DISCONTINUED | OUTPATIENT
Start: 2017-12-23 | End: 2017-12-23

## 2017-12-23 RX ORDER — MEROPENEM 1 G/30ML
2000 INJECTION INTRAVENOUS ONCE
Qty: 0 | Refills: 0 | Status: DISCONTINUED | OUTPATIENT
Start: 2017-12-23 | End: 2017-12-23

## 2017-12-23 RX ORDER — PIPERACILLIN AND TAZOBACTAM 4; .5 G/20ML; G/20ML
3.38 INJECTION, POWDER, LYOPHILIZED, FOR SOLUTION INTRAVENOUS EVERY 8 HOURS
Qty: 0 | Refills: 0 | Status: DISCONTINUED | OUTPATIENT
Start: 2017-12-23 | End: 2017-12-23

## 2017-12-23 RX ORDER — ACYCLOVIR SODIUM 500 MG
700 VIAL (EA) INTRAVENOUS ONCE
Qty: 0 | Refills: 0 | Status: COMPLETED | OUTPATIENT
Start: 2017-12-23 | End: 2017-12-23

## 2017-12-23 RX ORDER — MEROPENEM 1 G/30ML
2000 INJECTION INTRAVENOUS EVERY 8 HOURS
Qty: 0 | Refills: 0 | Status: DISCONTINUED | OUTPATIENT
Start: 2017-12-23 | End: 2017-12-23

## 2017-12-23 RX ORDER — VANCOMYCIN HCL 1 G
1250 VIAL (EA) INTRAVENOUS EVERY 24 HOURS
Qty: 0 | Refills: 0 | Status: DISCONTINUED | OUTPATIENT
Start: 2017-12-24 | End: 2017-12-27

## 2017-12-23 RX ORDER — CEFTRIAXONE 500 MG/1
INJECTION, POWDER, FOR SOLUTION INTRAMUSCULAR; INTRAVENOUS
Qty: 0 | Refills: 0 | Status: DISCONTINUED | OUTPATIENT
Start: 2017-12-23 | End: 2018-01-02

## 2017-12-23 RX ORDER — AMPICILLIN TRIHYDRATE 250 MG
2 CAPSULE ORAL EVERY 6 HOURS
Qty: 0 | Refills: 0 | Status: DISCONTINUED | OUTPATIENT
Start: 2017-12-23 | End: 2017-12-27

## 2017-12-23 RX ORDER — CEFTRIAXONE 500 MG/1
2 INJECTION, POWDER, FOR SOLUTION INTRAMUSCULAR; INTRAVENOUS EVERY 12 HOURS
Qty: 0 | Refills: 0 | Status: DISCONTINUED | OUTPATIENT
Start: 2017-12-24 | End: 2018-01-02

## 2017-12-23 RX ORDER — VANCOMYCIN HCL 1 G
1250 VIAL (EA) INTRAVENOUS ONCE
Qty: 0 | Refills: 0 | Status: COMPLETED | OUTPATIENT
Start: 2017-12-23 | End: 2017-12-23

## 2017-12-23 RX ORDER — VANCOMYCIN HCL 1 G
VIAL (EA) INTRAVENOUS
Qty: 0 | Refills: 0 | Status: DISCONTINUED | OUTPATIENT
Start: 2017-12-23 | End: 2017-12-27

## 2017-12-23 RX ORDER — PIPERACILLIN AND TAZOBACTAM 4; .5 G/20ML; G/20ML
3.38 INJECTION, POWDER, LYOPHILIZED, FOR SOLUTION INTRAVENOUS ONCE
Qty: 0 | Refills: 0 | Status: COMPLETED | OUTPATIENT
Start: 2017-12-23 | End: 2017-12-23

## 2017-12-23 RX ORDER — ACYCLOVIR SODIUM 500 MG
VIAL (EA) INTRAVENOUS
Qty: 0 | Refills: 0 | Status: DISCONTINUED | OUTPATIENT
Start: 2017-12-23 | End: 2018-01-02

## 2017-12-23 RX ORDER — VANCOMYCIN HCL 1 G
VIAL (EA) INTRAVENOUS
Qty: 0 | Refills: 0 | Status: DISCONTINUED | OUTPATIENT
Start: 2017-12-23 | End: 2017-12-23

## 2017-12-23 RX ADMIN — SODIUM CHLORIDE 3 MILLILITER(S): 9 INJECTION INTRAMUSCULAR; INTRAVENOUS; SUBCUTANEOUS at 22:00

## 2017-12-23 RX ADMIN — SODIUM CHLORIDE 50 MILLILITER(S): 9 INJECTION, SOLUTION INTRAVENOUS at 08:36

## 2017-12-23 RX ADMIN — Medication 166.67 MILLIGRAM(S): at 12:48

## 2017-12-23 RX ADMIN — SODIUM CHLORIDE 3 MILLILITER(S): 9 INJECTION INTRAMUSCULAR; INTRAVENOUS; SUBCUTANEOUS at 12:16

## 2017-12-23 RX ADMIN — LACOSAMIDE 140 MILLIGRAM(S): 50 TABLET ORAL at 22:35

## 2017-12-23 RX ADMIN — LEVETIRACETAM 400 MILLIGRAM(S): 250 TABLET, FILM COATED ORAL at 08:39

## 2017-12-23 RX ADMIN — FOSPHENYTOIN 104 MILLIGRAM(S) PE: 50 INJECTION INTRAMUSCULAR; INTRAVENOUS at 14:47

## 2017-12-23 RX ADMIN — Medication 264 MILLIGRAM(S): at 16:47

## 2017-12-23 RX ADMIN — Medication 650 MILLIGRAM(S): at 10:00

## 2017-12-23 RX ADMIN — PIPERACILLIN AND TAZOBACTAM 200 GRAM(S): 4; .5 INJECTION, POWDER, LYOPHILIZED, FOR SOLUTION INTRAVENOUS at 12:13

## 2017-12-23 RX ADMIN — LACOSAMIDE 140 MILLIGRAM(S): 50 TABLET ORAL at 10:41

## 2017-12-23 RX ADMIN — LEVETIRACETAM 400 MILLIGRAM(S): 250 TABLET, FILM COATED ORAL at 20:28

## 2017-12-23 RX ADMIN — HEPARIN SODIUM 5000 UNIT(S): 5000 INJECTION INTRAVENOUS; SUBCUTANEOUS at 16:54

## 2017-12-23 RX ADMIN — CEFTRIAXONE 100 GRAM(S): 500 INJECTION, POWDER, FOR SOLUTION INTRAMUSCULAR; INTRAVENOUS at 18:56

## 2017-12-23 RX ADMIN — Medication 650 MILLIGRAM(S): at 16:51

## 2017-12-23 RX ADMIN — FOSPHENYTOIN 104 MILLIGRAM(S) PE: 50 INJECTION INTRAMUSCULAR; INTRAVENOUS at 23:24

## 2017-12-23 RX ADMIN — ROBINUL 0.2 MILLIGRAM(S): 0.2 INJECTION INTRAMUSCULAR; INTRAVENOUS at 10:46

## 2017-12-23 RX ADMIN — HEPARIN SODIUM 5000 UNIT(S): 5000 INJECTION INTRAVENOUS; SUBCUTANEOUS at 06:40

## 2017-12-23 RX ADMIN — SODIUM CHLORIDE 3 MILLILITER(S): 9 INJECTION INTRAMUSCULAR; INTRAVENOUS; SUBCUTANEOUS at 06:36

## 2017-12-23 RX ADMIN — FOSPHENYTOIN 104 MILLIGRAM(S) PE: 50 INJECTION INTRAMUSCULAR; INTRAVENOUS at 06:30

## 2017-12-23 NOTE — PROGRESS NOTE ADULT - ASSESSMENT
76 yo M with dementia (unclear baseline), p/w episode of obtundation with course c/b seizures x 2 in the ED that initially resolved with Ativan. 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 Hospital course now complicated by fever, SIRS, unclear source.

## 2017-12-23 NOTE — PROGRESS NOTE ADULT - ATTENDING COMMENTS
the patient is more awake and alert today.   He is oriented to person and place and follows simple commands.   There has been no clinical seizure activity noted for the past   48 hours.  on today's examination, he has what appears to be a new left hemiparesis which may be related to  a new stroke or a postictal Mal's paralysis.  Continue with the anticonvulsant regimen and will order a stat CT scan of the head.  If negative, will order MRI scan of the head.

## 2017-12-23 NOTE — CONSULT NOTE ADULT - SUBJECTIVE AND OBJECTIVE BOX
HPI:    77 year old male with PMH Dementia (unknown baseline) who presented to [facility] on  12/15/17 with seizures. The patient was apparently seizing while shoveling snow for 2 hours and had EMS called by a concerned neighbor. The patient was found soaking wet and in the ED was found to have 2 seizures and was given 2mg Ativan IV. In the ED he was initially afebrile, tachycardic to > 90 and hypertensive to > 200 SBP. Leukocytosis to 14.62 on presentation. U/A with 0-2 WBC. Clear CXR. CT Head without acute abnormality. Developed fever to 100.6 on 17. Last febrile to 100.5 on . Started on Vanc/Zosyn on 17. Repeat CXR without abnormality. Repeat U/A with 5-10 WBC. RVP negative.     PAST MEDICAL & SURGICAL HISTORY:  Dementia without behavioral disturbance, unspecified dementia type  No significant past surgical history      Allergies  No Known Allergies        ANTIMICROBIALS:  piperacillin/tazobactam IVPB. 3.375 every 8 hours  vancomycin  IVPB        OTHER MEDS: MEDICATIONS  (STANDING):  acetaminophen  Suppository 650 every 6 hours PRN  fosphenytoin IVPB 100 every 8 hours  heparin  Injectable 5000 every 12 hours  lacosamide IVPB    lacosamide IVPB 200 every 12 hours  levETIRAcetam  IVPB 1500 every 12 hours      SOCIAL HISTORY:  [ ] etoh [ ] tobacco [ ] former smoker [ ] IVDU    FAMILY HISTORY:  No pertinent family history in first degree relatives      REVIEW OF SYSTEMS  [  ] ROS unobtainable because:    [  ] All other systems negative except as noted below:	    Constitutional:  [ ] fever [ ] weight loss  Skin:  [ ] rash [ ] phlebitis	  Eyes: [ ] icterus [ ] inflammation	  ENMT: [ ] discharge [ ] thrush [ ] ulcers [ ] exudates  Respiratory: [ ] dyspnea [ ] hemoptysis [ ] cough [ ] sputum	  Cardiovascular:  [ ] chest pain [ ] palpitations [ ] edema	  Gastrointestinal:  [ ] nausea [ ] vomiting [ ] diarrhea [ ] constipation [ ] pain	  Genitourinary:  [ ] dysuria [ ] frequency [ ] hematuria [ ] discharge [ ] flank pain  Musculoskeletal:  [ ] myalgias [ ] arthralgias [ ] arthritis	  Neurological:  [ ] headache [ ] seizures	  Psychiatric:  [ ] anxiety [ ] depression	  Hematology/Lymphatics:  [ ] lymphadenopathy  Endocrine:  [ ] adrenal [ ] thyroid  Allergic/Immunologic:	 [ ] transplant [ ] seasonal    Vital Signs Last 24 Hrs  T(F): 99 (17 @ 12:07), Max: 101 (17 @ 09:32)    Vital Signs Last 24 Hrs  HR: 69 (17 @ 08:30) (64 - 81)  BP: 109/56 (17 @ 08:30) (109/56 - 133/66)  RR: 20 (17 @ 12:07)  SpO2: 98% (17 @ 12:07) (97% - 100%)  Wt(kg): --    PHYSICAL EXAM:  General: non-toxic  HEAD/EYES: anicteric, PERRL  ENT:  supple  Cardiovascular:   S1, S2  Respiratory:  clear bilaterally  GI:  soft, non-tender, normal bowel sounds  :  no CVA tenderness   Musculoskeletal:  no synovitis  Neurologic:  grossly non-focal  Skin:  no rash  Lymph: no lymphadenopathy  Psychiatric:  appropriate affect  Vascular:  no phlebitis                                11.4   14.83 )-----------( 316      ( 23 Dec 2017 07:10 )             36.2       12    140  |  103  |  24<H>  ----------------------------<  161<H>  3.8   |  24  |  1.14    Ca    9.0      23 Dec 2017 07:10  Mg     2.2             Urinalysis Basic - ( 22 Dec 2017 19:00 )    Color: YELLOW / Appearance: CLEAR / S.024 / pH: 7.0  Gluc: NEGATIVE / Ketone: NEGATIVE  / Bili: NEGATIVE / Urobili: 4 mg/dL   Blood: TRACE / Protein: 300 mg/dL / Nitrite: NEGATIVE   Leuk Esterase: NEGATIVE / RBC: 5-10 / WBC 0-2   Sq Epi: x / Non Sq Epi: x / Bacteria: x        MICROBIOLOGY:    [facility] - 17 - Negative    RADIOLOGY:    EEG  2017-2017  Abnormal EEG in the drowsy state  -Clinical Seizures: No clinical signs  -EEG Seizures: Regional, Right Frontal    -Sharp Waves, Regional, Right Fronto-central, Maximum Fp2  Sharp Waves, Regional, Right Parietal, Maximum P4  -Continuous slowing, Generalized and Lateralized, right hemisphere     EXAM:  CT BRAIN    PROCEDURE DATE:  Dec 15 2017   No acute intracranial hemorrhage, mass effect or evidence of acute   territorial infarct. Mild ventriculomegaly superimposed on cerebral   volume loss.    EXAM:  RAD CHEST PORTABLE URGENT    PROCEDURE DATE:  Dec 19 2017   Clear lungs. HPI:    History limited to chart review as the patient is encephalopathic at time of assessment.     77 year old male with PMH Dementia (unknown baseline) who presented to [facility] on  12/15/17 with seizures. The patient was apparently seizing while shoveling snow for 2 hours and had EMS called by a concerned neighbor. The patient was found soaking wet and in the ED was found to have 2 seizures and was given 2mg Ativan IV. In the ED he was initially afebrile, tachycardic to > 90 and hypertensive to > 200 SBP. Leukocytosis to 14.62 on presentation. U/A with 0-2 WBC. Clear CXR. CT Head without acute abnormality. Developed fever to 100.6 on 17. Last febrile to 100.5 on . Started on Vanc/Zosyn on 17. Repeat CXR without abnormality. Repeat U/A with 5-10 WBC. RVP negative.     PAST MEDICAL & SURGICAL HISTORY:  Dementia without behavioral disturbance, unspecified dementia type  No significant past surgical history      Allergies  No Known Allergies        ANTIMICROBIALS:  piperacillin/tazobactam IVPB. 3.375 every 8 hours  vancomycin  IVPB        OTHER MEDS: MEDICATIONS  (STANDING):  acetaminophen  Suppository 650 every 6 hours PRN  fosphenytoin IVPB 100 every 8 hours  heparin  Injectable 5000 every 12 hours  lacosamide IVPB    lacosamide IVPB 200 every 12 hours  levETIRAcetam  IVPB 1500 every 12 hours      SOCIAL HISTORY:  Unable to obtain as patient is encephalopathic at time of assessment.     FAMILY HISTORY:  No pertinent family history in first degree relatives      REVIEW OF SYSTEMS  [x  ] ROS unobtainable because:  patient is encephalopathic at time of assessment.     [  ] All other systems negative except as noted below:	    Constitutional:  [ ] fever [ ] weight loss  Skin:  [ ] rash [ ] phlebitis	  Eyes: [ ] icterus [ ] inflammation	  ENMT: [ ] discharge [ ] thrush [ ] ulcers [ ] exudates  Respiratory: [ ] dyspnea [ ] hemoptysis [ ] cough [ ] sputum	  Cardiovascular:  [ ] chest pain [ ] palpitations [ ] edema	  Gastrointestinal:  [ ] nausea [ ] vomiting [ ] diarrhea [ ] constipation [ ] pain	  Genitourinary:  [ ] dysuria [ ] frequency [ ] hematuria [ ] discharge [ ] flank pain  Musculoskeletal:  [ ] myalgias [ ] arthralgias [ ] arthritis	  Neurological:  [ ] headache [ ] seizures	  Psychiatric:  [ ] anxiety [ ] depression	  Hematology/Lymphatics:  [ ] lymphadenopathy  Endocrine:  [ ] adrenal [ ] thyroid  Allergic/Immunologic:	 [ ] transplant [ ] seasonal    Vital Signs Last 24 Hrs  T(F): 99 (17 @ 12:07), Max: 101 (17 @ 09:32)    Vital Signs Last 24 Hrs  HR: 69 (17 @ 08:30) (64 - 81)  BP: 109/56 (17 @ 08:30) (109/56 - 133/66)  RR: 20 (17 @ 12:07)  SpO2: 98% (17 @ 12:07) (97% - 100%)  Wt(kg): --    PHYSICAL EXAMINATION:  General: Awake but not alert or oriented.  rhythmic movements of the mouth, seizure at time of assessment?  HEENT: PERRL, EOMI, No subconjunctival hemorrhages  Neck: Stiff and resisting flexion (but patient is having rhythmic movements of the mouth, seizure at time of assessment?)  Cardiac: RRR, No M/R/G  Resp: CTAB, No Wh/Rh/Ra  Abdomen: NBS, NT/ND, No HSM, No rigidity or guarding  MSK: No LE edema. No stigmata of IE. No evidence of phlebitis. No evidence of synovitis.  Back: No CVA Tenderness  Skin: No rashes or lesions. Skin is warm and dry to the touch.   Neuro: Awake but not alert or oriented. Not moving extremities spontaneously during examination.   Psych: Calm, Pleasant, Cooperative                        11.4   14.83 )-----------( 316      ( 23 Dec 2017 07:10 )             36.2           140  |  103  |  24<H>  ----------------------------<  161<H>  3.8   |  24  |  1.14    Ca    9.0      23 Dec 2017 07:10  Mg     2.2             Urinalysis Basic - ( 22 Dec 2017 19:00 )    Color: YELLOW / Appearance: CLEAR / S.024 / pH: 7.0  Gluc: NEGATIVE / Ketone: NEGATIVE  / Bili: NEGATIVE / Urobili: 4 mg/dL   Blood: TRACE / Protein: 300 mg/dL / Nitrite: NEGATIVE   Leuk Esterase: NEGATIVE / RBC: 5-10 / WBC 0-2   Sq Epi: x / Non Sq Epi: x / Bacteria: x    MICROBIOLOGY:    RVP - 17 - Negative    RADIOLOGY:    EEG  2017-2017  Abnormal EEG in the drowsy state  -Clinical Seizures: No clinical signs  -EEG Seizures: Regional, Right Frontal    -Sharp Waves, Regional, Right Fronto-central, Maximum Fp2  Sharp Waves, Regional, Right Parietal, Maximum P4  -Continuous slowing, Generalized and Lateralized, right hemisphere     EXAM:  CT BRAIN    PROCEDURE DATE:  Dec 15 2017   No acute intracranial hemorrhage, mass effect or evidence of acute   territorial infarct. Mild ventriculomegaly superimposed on cerebral   volume loss.    EXAM:  RAD CHEST PORTABLE URGENT    PROCEDURE DATE:  Dec 19 2017   Clear lungs. HPI:    History limited to chart review as the patient is encephalopathic at time of assessment.     77 year old male with PMH Dementia (unknown baseline) who presented to [facility] on  12/15/17 with seizures. The patient was apparently seizing while shoveling snow and had EMS called by a concerned neighbor. The patient was found soaking wet and in the ED was found to have 2 seizures and was given 2mg Ativan IV. In the ED he was initially afebrile, tachycardic to > 90 and hypertensive to > 200 SBP. Leukocytosis to 14.62 on presentation. U/A with 0-2 WBC. Clear CXR. CT Head without acute abnormality. Developed fever to 100.6 on 17. Last febrile to 100.5 on . Started on Vanc/Zosyn on 17. Repeat CXR without abnormality. Repeat U/A with 5-10 WBC. RVP negative. Neurology tentatively planning to proceed with LP.     PAST MEDICAL & SURGICAL HISTORY:  Dementia without behavioral disturbance, unspecified dementia type  No significant past surgical history      Allergies  No Known Allergies        ANTIMICROBIALS:  piperacillin/tazobactam IVPB. 3.375 every 8 hours  vancomycin  IVPB        OTHER MEDS: MEDICATIONS  (STANDING):  acetaminophen  Suppository 650 every 6 hours PRN  fosphenytoin IVPB 100 every 8 hours  heparin  Injectable 5000 every 12 hours  lacosamide IVPB    lacosamide IVPB 200 every 12 hours  levETIRAcetam  IVPB 1500 every 12 hours      SOCIAL HISTORY:  Unable to obtain as patient is encephalopathic at time of assessment.     FAMILY HISTORY:  No pertinent family history in first degree relatives      REVIEW OF SYSTEMS  [x  ] ROS unobtainable because:  patient is encephalopathic at time of assessment.     [  ] All other systems negative except as noted below:	    Constitutional:  [ ] fever [ ] weight loss  Skin:  [ ] rash [ ] phlebitis	  Eyes: [ ] icterus [ ] inflammation	  ENMT: [ ] discharge [ ] thrush [ ] ulcers [ ] exudates  Respiratory: [ ] dyspnea [ ] hemoptysis [ ] cough [ ] sputum	  Cardiovascular:  [ ] chest pain [ ] palpitations [ ] edema	  Gastrointestinal:  [ ] nausea [ ] vomiting [ ] diarrhea [ ] constipation [ ] pain	  Genitourinary:  [ ] dysuria [ ] frequency [ ] hematuria [ ] discharge [ ] flank pain  Musculoskeletal:  [ ] myalgias [ ] arthralgias [ ] arthritis	  Neurological:  [ ] headache [ ] seizures	  Psychiatric:  [ ] anxiety [ ] depression	  Hematology/Lymphatics:  [ ] lymphadenopathy  Endocrine:  [ ] adrenal [ ] thyroid  Allergic/Immunologic:	 [ ] transplant [ ] seasonal    Vital Signs Last 24 Hrs  T(F): 99 (17 @ 12:07), Max: 101 (17 @ 09:32)    Vital Signs Last 24 Hrs  HR: 69 (17 @ 08:30) (64 - 81)  BP: 109/56 (17 @ 08:30) (109/56 - 133/66)  RR: 20 (17 @ 12:07)  SpO2: 98% (17 @ 12:07) (97% - 100%)  Wt(kg): --    PHYSICAL EXAMINATION:  General: Awake but not alert or oriented.  rhythmic movements of the mouth, seizure at time of assessment?  HEENT: PERRL, EOMI, No subconjunctival hemorrhages  Neck: Stiff and resisting flexion (but patient is having rhythmic movements of the mouth, seizure at time of assessment?)  Cardiac: RRR, No M/R/G  Resp: CTAB, No Wh/Rh/Ra  Abdomen: NBS, NT/ND, No HSM, No rigidity or guarding  MSK: No LE edema. No stigmata of IE. No evidence of phlebitis. No evidence of synovitis.  Back: No CVA Tenderness  Skin: No rashes or lesions. Skin is warm and dry to the touch.   Neuro: Awake but not alert or oriented. Not moving extremities spontaneously during examination.   Psych: Calm, Pleasant, Cooperative                        11.4   14.83 )-----------( 316      ( 23 Dec 2017 07:10 )             36.2           140  |  103  |  24<H>  ----------------------------<  161<H>  3.8   |  24  |  1.14    Ca    9.0      23 Dec 2017 07:10  Mg     2.2             Urinalysis Basic - ( 22 Dec 2017 19:00 )    Color: YELLOW / Appearance: CLEAR / S.024 / pH: 7.0  Gluc: NEGATIVE / Ketone: NEGATIVE  / Bili: NEGATIVE / Urobili: 4 mg/dL   Blood: TRACE / Protein: 300 mg/dL / Nitrite: NEGATIVE   Leuk Esterase: NEGATIVE / RBC: 5-10 / WBC 0-2   Sq Epi: x / Non Sq Epi: x / Bacteria: x    MICROBIOLOGY:    RVP - 17 - Negative    RADIOLOGY:    EEG  2017-2017  Abnormal EEG in the drowsy state  -Clinical Seizures: No clinical signs  -EEG Seizures: Regional, Right Frontal    -Sharp Waves, Regional, Right Fronto-central, Maximum Fp2  Sharp Waves, Regional, Right Parietal, Maximum P4  -Continuous slowing, Generalized and Lateralized, right hemisphere     EXAM:  CT BRAIN    PROCEDURE DATE:  Dec 15 2017   No acute intracranial hemorrhage, mass effect or evidence of acute   territorial infarct. Mild ventriculomegaly superimposed on cerebral   volume loss.    EXAM:  RAD CHEST PORTABLE URGENT    PROCEDURE DATE:  Dec 19 2017   Clear lungs.

## 2017-12-23 NOTE — CHART NOTE - NSCHARTNOTEFT_GEN_A_CORE
Spoke with pt's daughter-in-law Chelita who states she is the only family member involved in his care and makes all decisions. Discussed need for LP to evaluate infection. She is concerned pt will not have any quality of life after this medical event and does not want to put him through additional invasive tests at this time. I discussed why the LP would be helpful in working up possible meningitis and helpful in determining type and length of antibiotic course. She said she still does not want an LP done at this time. Will continue to discuss GOC and plans going forward with Chelita.

## 2017-12-23 NOTE — PROGRESS NOTE ADULT - ASSESSMENT
Pt is a 77 year old man with unknown PMH BIBEMS found outside shoveling snow soaking wet by neighbors, brought to ED, where he had 2 witnessed seizures, treated w/ Ativan and started on keppra, dose increased to 750 mg BID given 2 noted breakthrough seizures on 12/18. 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     Recommendations:  -Will follow EEG result  -c/w keppra 1500mg bid  -c/w phosphenytoin 100 TID  -c/w vimpat 200 BID  -c/w infectious w/u Pt is a 77 year old man with unknown PMH BIBEMS found outside shoveling snow soaking wet by neighbors, brought to ED, where he had 2 witnessed seizures, treated w/ Ativan and started on keppra, dose increased to 750 mg BID given 2 noted breakthrough seizures on 12/18. 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     Recommendations:  -Will follow EEG result  -c/w keppra 1500mg bid  -c/w phosphenytoin 100 TID  -c/w vimpat 200 BID  -c/w infectious w/u  -Discussed with step daughter [phone] (HCP) the need for an LP and she declined to consent. Would prefer if we made him as comfortable, seizure free and fever free as possible. Does not feel an LP would improve his outcomes (even if it could potentially help narrow down a cause of infection)

## 2017-12-23 NOTE — CONSULT NOTE ADULT - ATTENDING COMMENTS
77 year old male with Dementia (unknown baseline) presented with seizures.     Patient unable to provide any history at this time.     The patient was apparently seizing while shoveling snow and had EMS called by a concerned neighbor. The patient was found soaking wet and in the ED was found to have 2 seizures and was given 2mg Ativan IV.     In the ED he was initially afebrile, tachycardic, hypertensive to > 200 SBP, leukocytosis 14.62 on presentation. UA neg, CXR clear. CT Head without acute abnormality.     Developed fevers during hospital stay. 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 in the setting of right frontal seizure activity  -Leukocytosis    Recommend:  -Continue meropenem/ vancomycin/ acyclovir  -Monitor vanco trough.  -LP if family agrees  -Check MRI Brain  -Check serum crypt ag.  -Check WNV serologies in serum.  -F/U bld cxs.    Sky Phoenix MD  Pager [phone]  After 5pm/weekends call [phone] 77 year old male with Dementia (unknown baseline) presented with seizures.     Patient unable to provide any history at this time.     The patient was apparently seizing while shoveling snow and had EMS called by a concerned neighbor. The patient was found soaking wet and in the ED was found to have 2 seizures and was given 2mg Ativan IV.     In the ED he was initially afebrile, tachycardic, hypertensive to > 200 SBP, leukocytosis 14.62 on presentation. UA neg, CXR clear. CT Head without acute abnormality.     Developed fevers during hospital stay. 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 in the setting of right frontal seizure activity  -Leukocytosis    Recommend:  -Continue ceftriaxone/ ampicillin/ vancomycin/ acyclovir  -Monitor vanco trough.  -LP if family agrees  -Check MRI Brain  -Check serum crypt ag.  -Check WNV serologies in serum.  -F/U bld cxs.    Discussed plan with medicine attending.    Sky Phoenix MD  Pager [phone]  After 5pm/weekends call [phone]

## 2017-12-23 NOTE — PROGRESS NOTE ADULT - PROBLEM SELECTOR PLAN 1
- f/u with neurology re vEEG results overnight. Pt had AED regimen adjusted yesterday by neuro.  - EEG with  focal dysfunction in the right anterior temporal region that is potentially epileptogenic, suggestive of a possible structural abnormality  -MRI with nohemy to eval for structural lesion pending clinical stability, Consent obtained by PA, in chart, will need to attempt imaging when patient more stable

## 2017-12-23 NOTE — PROGRESS NOTE ADULT - SUBJECTIVE AND OBJECTIVE BOX
Neurology Follow up note    Objective:   Vital Signs Last 24 Hrs  T(C): 37.2 (23 Dec 2017 12:07), Max: 38.1 (23 Dec 2017 09:59)  T(F): 99 (23 Dec 2017 12:07), Max: 100.5 (23 Dec 2017 09:59)  HR: 69 (23 Dec 2017 08:30) (64 - 81)  BP: 109/56 (23 Dec 2017 08:30) (109/56 - 133/66)  BP(mean): --  RR: 20 (23 Dec 2017 12:07) (18 - 20)  SpO2: 98% (23 Dec 2017 12:07) (97% - 100%)      General Exam:   General appearance: No acute distress                 Neurological Exam:  Mental Status: Awake and following simple commands (improved from yesterday). Moving spontaneously. Orientated to self, date and place. Attention intact. Copious secretions in throat but no discernible dysarthria, aphasia or neglect.    Cranial Nerves:  PERRL, EOMI, VFF grossly, no nystagmus or diplopia. No gross facial asymmetry.  Motor:   Tone: normal.                  Strength: intact throughout  Pronator drift: none                 Tremor: No resting, postural or action tremor.  No myoclonus.    Deep Tendon Reflexes: 1+ bilateral biceps, triceps, brachioradialis, knee  Toes flexor bilaterally      12-23    140  |  103  |  24<H>  ----------------------------<  161<H>  3.8   |  24  |  1.14    Ca    9.0      23 Dec 2017 07:10  Mg     2.2     12-23 12-23    140  |  103  |  24<H>  ----------------------------<  161<H>  3.8   |  24  |  1.14    Ca    9.0      23 Dec 2017 07:10  Mg     2.2     12-23                              11.4   14.83 )-----------( 316      ( 23 Dec 2017 07:10 )             36.2       MEDICATIONS  (STANDING):  dextrose 5%. 1000 milliLiter(s) (50 mL/Hr) IV Continuous <Continuous>  fosphenytoin IVPB 100 milliGRAM(s) PE IV Intermittent every 8 hours  heparin  Injectable 5000 Unit(s) SubCutaneous every 12 hours  lacosamide IVPB      lacosamide IVPB 200 milliGRAM(s) IV Intermittent every 12 hours  levETIRAcetam  IVPB 1500 milliGRAM(s) IV Intermittent every 12 hours  multivitamin 1 Tablet(s) Oral daily  piperacillin/tazobactam IVPB. 3.375 Gram(s) IV Intermittent every 8 hours  sodium chloride 0.9% lock flush 3 milliLiter(s) IV Push every 8 hours  vancomycin  IVPB        MEDICATIONS  (PRN):  acetaminophen  Suppository 650 milliGRAM(s) Rectal every 6 hours PRN For Temp greater than 38 C (100.4 F)  sodium chloride 0.65% Nasal 1 Spray(s) Both Nostrils four times a day PRN Congestion Neurology Follow up note    Objective:   Vital Signs Last 24 Hrs  T(C): 37.2 (23 Dec 2017 12:07), Max: 38.1 (23 Dec 2017 09:59)  T(F): 99 (23 Dec 2017 12:07), Max: 100.5 (23 Dec 2017 09:59)  HR: 69 (23 Dec 2017 08:30) (64 - 81)  BP: 109/56 (23 Dec 2017 08:30) (109/56 - 133/66)  BP(mean): --  RR: 20 (23 Dec 2017 12:07) (18 - 20)  SpO2: 98% (23 Dec 2017 12:07) (97% - 100%)      General Exam:   General appearance: No acute distress                 Neurological Exam:  Mental Status: Awake and following simple commands (improved from yesterday). Moving spontaneously. Orientated to self, date and place. Attention intact. Copious secretions in throat but no discernible dysarthria, aphasia or neglect.    Cranial Nerves:  PERRL, EOMI, VFF grossly, no nystagmus or diplopia. there is flattening of the left nasal labial fold.   Motor: 3 over 5 left hemiparesis.  No involuntary movements of the left lower extremity  Tone: decreased tone on the left         Strength: left hemiparesis  Pronator drift: left-sided drift             Tremor: No resting, postural or action tremor.  No myoclonus.    Deep Tendon Reflexes: left reflex preponderance with a neutral plantar response      12-23    140  |  103  |  24<H>  ----------------------------<  161<H>  3.8   |  24  |  1.14    Ca    9.0      23 Dec 2017 07:10  Mg     2.2     12-23 12-23    140  |  103  |  24<H>  ----------------------------<  161<H>  3.8   |  24  |  1.14    Ca    9.0      23 Dec 2017 07:10  Mg     2.2     12-23                              11.4   14.83 )-----------( 316      ( 23 Dec 2017 07:10 )             36.2       MEDICATIONS  (STANDING):  dextrose 5%. 1000 milliLiter(s) (50 mL/Hr) IV Continuous <Continuous>  fosphenytoin IVPB 100 milliGRAM(s) PE IV Intermittent every 8 hours  heparin  Injectable 5000 Unit(s) SubCutaneous every 12 hours  lacosamide IVPB      lacosamide IVPB 200 milliGRAM(s) IV Intermittent every 12 hours  levETIRAcetam  IVPB 1500 milliGRAM(s) IV Intermittent every 12 hours  multivitamin 1 Tablet(s) Oral daily  piperacillin/tazobactam IVPB. 3.375 Gram(s) IV Intermittent every 8 hours  sodium chloride 0.9% lock flush 3 milliLiter(s) IV Push every 8 hours  vancomycin  IVPB        MEDICATIONS  (PRN):  acetaminophen  Suppository 650 milliGRAM(s) Rectal every 6 hours PRN For Temp greater than 38 C (100.4 F)  sodium chloride 0.65% Nasal 1 Spray(s) Both Nostrils four times a day PRN Congestion

## 2017-12-23 NOTE — PROGRESS NOTE ADULT - SUBJECTIVE AND OBJECTIVE BOX
Patient is a 77y old  Male who presents with a chief complaint of Seizure x 1 day (16 Dec 2017 01:26)      SUBJECTIVE / OVERNIGHT EVENTS: Pt responsive this morning. When asked if having any pain or discomfort pt states no.     MEDICATIONS  (STANDING):  dextrose 5%. 1000 milliLiter(s) (50 mL/Hr) IV Continuous <Continuous>  fosphenytoin IVPB 100 milliGRAM(s) PE IV Intermittent every 8 hours  heparin  Injectable 5000 Unit(s) SubCutaneous every 12 hours  lacosamide IVPB      lacosamide IVPB 200 milliGRAM(s) IV Intermittent every 12 hours  levETIRAcetam  IVPB 1500 milliGRAM(s) IV Intermittent every 12 hours  multivitamin 1 Tablet(s) Oral daily  sodium chloride 0.9% lock flush 3 milliLiter(s) IV Push every 8 hours    MEDICATIONS  (PRN):  acetaminophen  Suppository 650 milliGRAM(s) Rectal every 6 hours PRN For Temp greater than 38 C (100.4 F)  glycopyrrolate Injectable 0.2 milliGRAM(s) IV Push every 6 hours PRN chest congestion  sodium chloride 0.65% Nasal 1 Spray(s) Both Nostrils four times a day PRN Congestion      Vital Signs Last 24 Hrs  T(C): 37.1 (23 Dec 2017 06:15), Max: 37.9 (22 Dec 2017 21:01)  T(F): 98.7 (23 Dec 2017 06:15), Max: 100.2 (22 Dec 2017 21:01)  HR: 69 (23 Dec 2017 08:30) (64 - 81)  BP: 109/56 (23 Dec 2017 08:30) (109/56 - 133/66)  BP(mean): --  RR: 20 (23 Dec 2017 08:30) (18 - 20)  SpO2: 97% (23 Dec 2017 08:30) (97% - 100%)  CAPILLARY BLOOD GLUCOSE        I&O's Summary      PHYSICAL EXAM:  GENERAL: Lethargic but opens eyes and follows commands to verbal stimuli  HEAD:  Atraumatic, Normocephalic  EYES: EOMI, PERRLA, conjunctiva and sclera clear  NECK: Supple, No JVD  CHEST/LUNG: Rhonchi upper airway, clear bases. No wheezing  HEART: Regular rate and rhythm; No murmurs, rubs, or gallops  ABDOMEN: Soft, Nontender, Nondistended; Bowel sounds present  EXTREMITIES:  2+ Peripheral Pulses, No clubbing, cyanosis, or edema  PSYCH: Unable to assess due to lethargy  NEUROLOGY: Follows commands. Motor 4/5 RUE and RLE, 0/5 LUE and LLE  SKIN: No rashes or lesions    LABS:                        11.4   14.83 )-----------( 316      ( 23 Dec 2017 07:10 )             36.2         140  |  103  |  24<H>  ----------------------------<  161<H>  3.8   |  24  |  1.14    Ca    9.0      23 Dec 2017 07:10  Mg     2.2                 Urinalysis Basic - ( 22 Dec 2017 19:00 )    Color: YELLOW / Appearance: CLEAR / S.024 / pH: 7.0  Gluc: NEGATIVE / Ketone: NEGATIVE  / Bili: NEGATIVE / Urobili: 4 mg/dL   Blood: TRACE / Protein: 300 mg/dL / Nitrite: NEGATIVE   Leuk Esterase: NEGATIVE / RBC: 5-10 / WBC 0-2   Sq Epi: x / Non Sq Epi: x / Bacteria: x        RADIOLOGY & ADDITIONAL TESTS:    Imaging Personally Reviewed:    Consultant(s) Notes Reviewed:  Neurology    Care Discussed with Consultants/Other Providers: Neurology

## 2017-12-23 NOTE — PROGRESS NOTE ADULT - PROBLEM SELECTOR PLAN 4
nursing originally concerned for dysphagia, noting coughing with PO intake, requiring suctioning, c/w oral care  - previously on dysphagia 1 diet with nectar thick liquids  - as per d/w step-daughter, was NPO pending formal swallow eval, with plan to readdress nutritional status after recommendations made, (however now swallow eval will have to wait improvement in clinical status)  - given current clinical status with continued seizures and lethargy, PO intake is not safe at this point.  - on IV fluids

## 2017-12-23 NOTE — EEG REPORT - NS EEG TEXT BOX
[facility]  Report of Continuous Video EEG    [facility]: [address], [place] [address], Ph#: [phone]  [facility]: [id number] [place] [address], [place] [address], Ph#: [phone]  Office: [address] [facility], [facility], [place] [address] Ph#: [phone]    Patient Name: DELGADO SUE    Age: 77 y, : 1940  Patient ID: -, MRN #: -, Camargo: [id number]  Referring Physician: -  EEG #: [id number]    Study Information:    EEG Recording Technique:  The patient underwent continuous Video-EEG monitoring, using Telemetry System hardware on the XLTek Digital System. EEG and video data were stored on a computer hard drive with important events saved in digital archive files. The material was reviewed by a physician (electroencephalographer / epileptologist) on a daily basis. Nils and seizure detection algorithms were utilized and reviewed. An EEG Technician attended to the patient, and was available throughout daytime work hours.  The epilepsy center neurologist was available in person or on call 24-hours per day.  EEG Placement and Labeling of Electrodes:  The EEG was performed utilizing 20 channel referential EEG connections (coronal over temporal over parasagittal montage) using all standard 10-20 electrode placements with EKG, with additional electrodes placed in the inferior temporal region using the modified 10-10 montage electrode placements for elective admissions, or if deemed necessary. Recording was at a sampling rate of 256 samples per second per channel. Time synchronized digital video recording was done simultaneously with EEG recording. A low light infrared camera was used for low light recording.     History:  -Concern for Seizures     Medication	  	    Interpretation:    17-  Startin2017-2017    Daily EEG Visual Analysis  FINDINGS:  The background was continuous, variable, reactive and disorganized. The best posterior dominant rhythm was 7.5Hz. The background consisted of continuous diffuse theta and polymorphic delta activity.     Generalized slowing:   Continuous slowing, generalized and lateralized, right hemisphere  Focal slowing: none    Sleep Background:  Drowsiness was characterized by fragmentation, attenuation, and slowing of the background activity.      Other Paroxysmal Activity:  None     Interictal Epileptiform Activity:   -Intermittent brief runs of 0.8-1.5hz sharp waves lateralized over the right hemisphere with maximum in the right frontal region (Max Fp2). These did not evolve (PLEDS)  -Sharp Waves, Regional, Right Fronto-central, Maximum Fp2  -Sharp Waves, Regional, Right Parietal, Maximum P4      Ictal Epileptiform Activity or Events:  None    Activation Procedures:   -Hyperventilation was not performed.    -Photic stimulation was not performed.    Artifacts:  Intermittent myogenic and movement artifacts were noted.    ECG:  The heart rate on single channel ECG was predominantly between  BPM.    Classification:  Abnormal EEG  -PLEDS, Right Hemisphere, Maximum in the Right Frontal  Region  -Sharp Waves, Regional, Right Fronto-central, Maximum Fp2  -Sharp Waves, Regional, Right Parietal, Maximum P4        Clinical Impression:  Findings indicate the risk of focal seizures from the right hemisphere, with greatest prominence in the right fronto-central and right parietal regions. There is also evidence of a moderate diffuse encephalopathy with a greater degree of dysfunction in the right hemisphere. No clear seizures were recorded.         Hallie Ireland DO   Fellow in Neurophysiology/Epilepsy, [facility] [place]    	  Jewel Chapin M.D.			    Attending Physician, [facility] [place]

## 2017-12-23 NOTE — CONSULT NOTE ADULT - ASSESSMENT
77 year old male with PMH Dementia (unknown baseline) who presented to [facility] on  12/15/17 with seizures. The patient was apparently seizing while shoveling snow and EMS was called by a concerned neighbor. The patient was found soaking wet and in the ED was found to have 2 seizures and was given 2mg Ativan IV. In the ED he was initially afebrile, tachycardic to > 90 and hypertensive to > 200 SBP. Leukocytosis to 14.62 on presentation. U/A with 0-2 WBC. Clear CXR. CT Head without acute abnormality. Developed fever to 100.6 on 12/21/17. Last febrile to 100.5 on 12/23. Started on Vanc/Zosyn on 12/23/17. Repeat CXR without abnormality. Repeat U/A with 5-10 WBC. RVP negative. Planned for LP. 77 year old male with PMH Dementia (unknown baseline) who presented to [facility] on  12/15/17 with seizures. The patient was apparently seizing while shoveling snow and EMS was called by a concerned neighbor. The patient was found soaking wet and in the ED was found to have 2 seizures and was given 2mg Ativan IV. In the ED he was initially afebrile, tachycardic to > 90 and hypertensive to > 200 SBP. Leukocytosis to 14.62 on presentation. U/A with 0-2 WBC. Clear CXR. CT Head without acute abnormality. Developed fever to 100.6 on 12/21/17. Last febrile to 100.5 on 12/23. Started on Vanc/Zosyn on 12/23/17. Repeat CXR without abnormality. Repeat U/A with 5-10 WBC. RVP negative. Planned for LP.  --Start Meropenem 2g IV Q12H (Cr Cl ~50)  --Start Acyclovir 10 mg/kg IV Q12H  --Continue Vancomycin 1.25 g IV Q24H. Trough prior to third dose  --F/U LP Results. Recommend cell counts with diff, protein, glucose, CSF PCR Panel, gram stain and culture  --Recommend HIV Test  --Recommend serum Crypt Ag  --Recommend West Nile Serum PCR

## 2017-12-23 NOTE — PROGRESS NOTE ADULT - PROBLEM SELECTOR PLAN 2
- Pt febrile yesterday. WBC count elevated today. Will repeat rectal temp. If febrile will start broad spectrum antibiotics. So far UA negative for infection, RVP negative. Blood cultures pending. Will check CXR.  - d/w neuro possible need for LP. They will round on pt and advise.  - If CXR clear and pt remains febrile will consult ID for further input.

## 2017-12-24 LAB
BACTERIA UR CULT: SIGNIFICANT CHANGE UP
BASE EXCESS BLDA CALC-SCNC: 0.3 MMOL/L — SIGNIFICANT CHANGE UP
BUN SERPL-MCNC: 21 MG/DL — SIGNIFICANT CHANGE UP (ref 7–23)
CALCIUM SERPL-MCNC: 8.6 MG/DL — SIGNIFICANT CHANGE UP (ref 8.4–10.5)
CHLORIDE SERPL-SCNC: 101 MMOL/L — SIGNIFICANT CHANGE UP (ref 98–107)
CO2 SERPL-SCNC: 24 MMOL/L — SIGNIFICANT CHANGE UP (ref 22–31)
CREAT SERPL-MCNC: 1.03 MG/DL — SIGNIFICANT CHANGE UP (ref 0.5–1.3)
CRYPTOC AG FLD QL: NEGATIVE — SIGNIFICANT CHANGE UP
GLUCOSE SERPL-MCNC: 127 MG/DL — HIGH (ref 70–99)
HCO3 BLDA-SCNC: 26 MMOL/L — SIGNIFICANT CHANGE UP (ref 22–26)
HCT VFR BLD CALC: 33.5 % — LOW (ref 39–50)
HGB BLD-MCNC: 11 G/DL — LOW (ref 13–17)
MAGNESIUM SERPL-MCNC: 2.1 MG/DL — SIGNIFICANT CHANGE UP (ref 1.6–2.6)
MCHC RBC-ENTMCNC: 30 PG — SIGNIFICANT CHANGE UP (ref 27–34)
MCHC RBC-ENTMCNC: 32.8 % — SIGNIFICANT CHANGE UP (ref 32–36)
MCV RBC AUTO: 91.3 FL — SIGNIFICANT CHANGE UP (ref 80–100)
NRBC # FLD: 0 — SIGNIFICANT CHANGE UP
PCO2 BLDA: 28 MMHG — LOW (ref 35–48)
PH BLDA: 7.52 PH — HIGH (ref 7.35–7.45)
PLATELET # BLD AUTO: 293 K/UL — SIGNIFICANT CHANGE UP (ref 150–400)
PMV BLD: 9.7 FL — SIGNIFICANT CHANGE UP (ref 7–13)
PO2 BLDA: 131 MMHG — HIGH (ref 83–108)
POTASSIUM SERPL-MCNC: 3.3 MMOL/L — LOW (ref 3.5–5.3)
POTASSIUM SERPL-SCNC: 3.3 MMOL/L — LOW (ref 3.5–5.3)
RBC # BLD: 3.67 M/UL — LOW (ref 4.2–5.8)
RBC # FLD: 14.2 % — SIGNIFICANT CHANGE UP (ref 10.3–14.5)
SAO2 % BLDA: 99.5 % — HIGH (ref 95–99)
SODIUM SERPL-SCNC: 138 MMOL/L — SIGNIFICANT CHANGE UP (ref 135–145)
SPECIMEN SOURCE: SIGNIFICANT CHANGE UP
WBC # BLD: 14.24 K/UL — HIGH (ref 3.8–10.5)
WBC # FLD AUTO: 14.24 K/UL — HIGH (ref 3.8–10.5)

## 2017-12-24 PROCEDURE — 95951: CPT | Mod: 26

## 2017-12-24 PROCEDURE — 99233 SBSQ HOSP IP/OBS HIGH 50: CPT

## 2017-12-24 PROCEDURE — 99232 SBSQ HOSP IP/OBS MODERATE 35: CPT

## 2017-12-24 PROCEDURE — 70450 CT HEAD/BRAIN W/O DYE: CPT | Mod: 26

## 2017-12-24 RX ORDER — POTASSIUM CHLORIDE 20 MEQ
10 PACKET (EA) ORAL
Qty: 0 | Refills: 0 | Status: COMPLETED | OUTPATIENT
Start: 2017-12-24 | End: 2017-12-24

## 2017-12-24 RX ORDER — POTASSIUM CHLORIDE 20 MEQ
40 PACKET (EA) ORAL EVERY 4 HOURS
Qty: 0 | Refills: 0 | Status: DISCONTINUED | OUTPATIENT
Start: 2017-12-24 | End: 2017-12-24

## 2017-12-24 RX ADMIN — LEVETIRACETAM 400 MILLIGRAM(S): 250 TABLET, FILM COATED ORAL at 07:32

## 2017-12-24 RX ADMIN — HEPARIN SODIUM 5000 UNIT(S): 5000 INJECTION INTRAVENOUS; SUBCUTANEOUS at 17:07

## 2017-12-24 RX ADMIN — FOSPHENYTOIN 104 MILLIGRAM(S) PE: 50 INJECTION INTRAMUSCULAR; INTRAVENOUS at 15:44

## 2017-12-24 RX ADMIN — SODIUM CHLORIDE 3 MILLILITER(S): 9 INJECTION INTRAMUSCULAR; INTRAVENOUS; SUBCUTANEOUS at 06:55

## 2017-12-24 RX ADMIN — SODIUM CHLORIDE 3 MILLILITER(S): 9 INJECTION INTRAMUSCULAR; INTRAVENOUS; SUBCUTANEOUS at 14:43

## 2017-12-24 RX ADMIN — FOSPHENYTOIN 104 MILLIGRAM(S) PE: 50 INJECTION INTRAMUSCULAR; INTRAVENOUS at 21:18

## 2017-12-24 RX ADMIN — LACOSAMIDE 140 MILLIGRAM(S): 50 TABLET ORAL at 10:15

## 2017-12-24 RX ADMIN — Medication 166.67 MILLIGRAM(S): at 14:42

## 2017-12-24 RX ADMIN — Medication 264 MILLIGRAM(S): at 07:52

## 2017-12-24 RX ADMIN — Medication 264 MILLIGRAM(S): at 20:20

## 2017-12-24 RX ADMIN — Medication 216 GRAM(S): at 09:12

## 2017-12-24 RX ADMIN — Medication 650 MILLIGRAM(S): at 07:28

## 2017-12-24 RX ADMIN — Medication 216 GRAM(S): at 14:46

## 2017-12-24 RX ADMIN — Medication 100 MILLIEQUIVALENT(S): at 10:15

## 2017-12-24 RX ADMIN — Medication 100 MILLIEQUIVALENT(S): at 14:40

## 2017-12-24 RX ADMIN — SODIUM CHLORIDE 50 MILLILITER(S): 9 INJECTION, SOLUTION INTRAVENOUS at 04:22

## 2017-12-24 RX ADMIN — Medication 100 MILLIEQUIVALENT(S): at 11:24

## 2017-12-24 RX ADMIN — Medication 216 GRAM(S): at 03:02

## 2017-12-24 RX ADMIN — HEPARIN SODIUM 5000 UNIT(S): 5000 INJECTION INTRAVENOUS; SUBCUTANEOUS at 06:39

## 2017-12-24 RX ADMIN — SODIUM CHLORIDE 3 MILLILITER(S): 9 INJECTION INTRAMUSCULAR; INTRAVENOUS; SUBCUTANEOUS at 23:00

## 2017-12-24 RX ADMIN — CEFTRIAXONE 100 GRAM(S): 500 INJECTION, POWDER, FOR SOLUTION INTRAMUSCULAR; INTRAVENOUS at 17:06

## 2017-12-24 RX ADMIN — FOSPHENYTOIN 104 MILLIGRAM(S) PE: 50 INJECTION INTRAMUSCULAR; INTRAVENOUS at 06:34

## 2017-12-24 RX ADMIN — CEFTRIAXONE 100 GRAM(S): 500 INJECTION, POWDER, FOR SOLUTION INTRAMUSCULAR; INTRAVENOUS at 05:56

## 2017-12-24 RX ADMIN — LEVETIRACETAM 400 MILLIGRAM(S): 250 TABLET, FILM COATED ORAL at 20:31

## 2017-12-24 RX ADMIN — LACOSAMIDE 140 MILLIGRAM(S): 50 TABLET ORAL at 22:34

## 2017-12-24 RX ADMIN — Medication 216 GRAM(S): at 20:54

## 2017-12-24 NOTE — PROGRESS NOTE ADULT - ASSESSMENT
76 yo M with dementia (unclear baseline), p/w episode of obtundation with course c/b seizures x 2 in the ED that initially resolved with Ativan. 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 Hospital course now complicated by fever, SIRS, cannot r/o meningitis vs encephalitis.

## 2017-12-24 NOTE — PROGRESS NOTE ADULT - PROBLEM SELECTOR PLAN 5
- c/w supportive care, unable to care for himself adequately  - seroquel discontinued (avoiding sedatives as per neuro, patient has not been able to take po meds in this period anyway)

## 2017-12-24 NOTE — PROGRESS NOTE ADULT - PROBLEM SELECTOR PLAN 2
- Pt still w fever and leukocytosis. ID consult appreciated.  - f/u blood cultures. All other cultures negative.  - Concern for underlying viral illness vs HSV encephalitis  - Decision maker for pt is Chelita (step-daughter). Is not agreeable to LP at this time. Informed her this would help us evaluate pt's sepsis and allow us to decide on length/course of antibiotics. Also advised her without this result he may be on unnecessary medications which can cause harm. She believes pt will not have meaningful neurological recovery and therefore does not want to put him through another invasive procedure. Will continue discussions with family.  - c/w Vancomycin (monitor troughs), Ceftriaxone, Acyclovir, Ampicillin  - Check cryptococcal, West nile antigens, HIV

## 2017-12-24 NOTE — PROGRESS NOTE ADULT - ATTENDING COMMENTS
77 year old male with Dementia (unknown baseline) presented with seizures.     Patient unable to provide any history at this time.     The patient was apparently seizing while shoveling snow and had EMS called by a concerned neighbor. The patient was found soaking wet and in the ED was found to have 2 seizures and was given 2mg Ativan IV.     In the ED he was initially afebrile, tachycardic, hypertensive to > 200 SBP, leukocytosis 14.62 on presentation. UA neg, CXR clear. CT Head without acute abnormality.     Developed fevers during hospital stay. 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 in the setting of right frontal seizure activity   -Leukocytosis  -Mental status now improving    Recommend:  -Continue ceftriaxone/ ampicillin/ vancomycin/ acyclovir  -Monitor vanco trough.  -LP if family agrees  -Check MRI Brain  -F/U bld cxs.  -IVF with acyclovir  -Trend WBC.  -Continue to monitor temperature curve.      Sky Phoenix MD  Pager [phone]  After 5pm/weekends call [phone]

## 2017-12-24 NOTE — EEG REPORT - NS EEG TEXT BOX
[facility]  Report of Continuous Video EEG    [facility]: [address], [place] [address], Ph#: [phone]  [facility]: [phone] [place] [address], [place] [address], Ph#: [phone]  Office: [address] [facility], [facility] [address], [place] [address] Ph#: [phone]    Patient Name: DELGADO SUE    Age: 77 y, : 1940  Patient ID: -, MRN #: -, Camargo: [id number]  Referring Physician: -  EEG #:     Study Date: 2017-2017    Study Information:    EEG Recording Technique:  The patient underwent continuous Video-EEG monitoring, using Telemetry System hardware on the XLTek Digital System. EEG and video data were stored on a computer hard drive with important events saved in digital archive files. The material was reviewed by a physician (electroencephalographer / epileptologist) on a daily basis. Nils and seizure detection algorithms were utilized and reviewed. An EEG Technician attended to the patient, and was available throughout daytime work hours.  The epilepsy center neurologist was available in person or on call 24-hours per day.  EEG Placement and Labeling of Electrodes:  The EEG was performed utilizing 20 channel referential EEG connections (coronal over temporal over parasagittal montage) using all standard 10-20 electrode placements with EKG, with additional electrodes placed in the inferior temporal region using the modified 10-10 montage electrode placements for elective admissions, or if deemed necessary. Recording was at a sampling rate of 256 samples per second per channel. Time synchronized digital video recording was done simultaneously with EEG recording. A low light infrared camera was used for low light recording.     History:  -Concern for Seizures    Medication	  No Data.	    Interpretation:      Startin2017- 2017    Daily EEG Visual Analysis  FINDINGS:  The background was continuous, variable, reactive and disorganized. There was no discernible posterior dominant rhythm. The background consisted of continuous diffuse theta and polymorphic delta activity.     Generalized slowing:   Continuous slowing, generalized and lateralized, right hemisphere    Sleep Background:  Drowsiness was characterized by fragmentation, attenuation, and slowing of the background activity.      Other Paroxysmal Activity:  None     Interictal Epileptiform Activity:   -Sharp Waves, Regional, Right Fronto-central, Maximum Fp2 >C4/P4      Ictal Epileptiform Activity or Events:  None    Activation Procedures:   -Hyperventilation was not performed.    -Photic stimulation was not performed.    Artifacts:  Intermittent myogenic and movement artifacts were noted.    ECG:  The heart rate on single channel ECG was predominantly between  BPM.    Classification:  Abnormal EEG  -Sharp Waves, Regional, Right Fronto-Central Region (Maximum Fp2 >C4/P4)  -Continuous Slowing, Generalized and Lateralized, Right hemisphere  -Disorganization      Clinical Impression:  Findings indicate the risk of focal seizures from the right hemisphere, with greatest prominence in the right fronto-central region. There is also evidence of a moderate diffuse encephalopathy with a greater degree of dysfunction in the right hemisphere. No clear seizures were recorded. This study is improved in comparison to the day prior.         Hallie Ireland DO   Fellow in Neurophysiology/Epilepsy, [facility] [place]    	  Jewel Chapin M.D.			    Attending Physician, [facility] [place]

## 2017-12-24 NOTE — PROGRESS NOTE ADULT - SUBJECTIVE AND OBJECTIVE BOX
CC: F/U concern for meningoencephalitis    Inverval History/ROS: Patient more awake. Responding to questions. AAOx1. Has no complaints. Denies N/V/D/C, fever, chills.    Allergies  No Known Allergies      ANTIMICROBIALS:  acyclovir IVPB    acyclovir IVPB 700 every 12 hours  ampicillin  IVPB 2 every 6 hours  cefTRIAXone   IVPB 2 every 12 hours  cefTRIAXone   IVPB    vancomycin  IVPB    vancomycin  IVPB 1250 every 24 hours      OTHER MEDS:  acetaminophen  Suppository 650 milliGRAM(s) Rectal every 6 hours PRN  dextrose 5%. 1000 milliLiter(s) IV Continuous <Continuous>  fosphenytoin IVPB 100 milliGRAM(s) PE IV Intermittent every 8 hours  heparin  Injectable 5000 Unit(s) SubCutaneous every 12 hours  lacosamide IVPB      lacosamide IVPB 200 milliGRAM(s) IV Intermittent every 12 hours  levETIRAcetam  IVPB 1500 milliGRAM(s) IV Intermittent every 12 hours  multivitamin 1 Tablet(s) Oral daily  potassium chloride  10 mEq/100 mL IVPB 10 milliEquivalent(s) IV Intermittent every 1 hour  sodium chloride 0.65% Nasal 1 Spray(s) Both Nostrils four times a day PRN  sodium chloride 0.9% lock flush 3 milliLiter(s) IV Push every 8 hours      PE:    Vital Signs Last 24 Hrs  T(C): 37.4 (24 Dec 2017 10:14), Max: 38.2 (23 Dec 2017 16:29)  T(F): 99.3 (24 Dec 2017 10:14), Max: 100.8 (23 Dec 2017 16:29)  HR: 53 (24 Dec 2017 13:01) (53 - 65)  BP: 133/74 (24 Dec 2017 13:01) (112/51 - 133/74)  BP(mean): --  RR: 18 (24 Dec 2017 13:01) (18 - 20)  SpO2: 97% (24 Dec 2017 13:01) (94% - 98%)    Gen: NAD, awake, answering questions  CV: S1+S2 normal, no murmurs  Resp: Clear bilat, no resp distress  Abd: Soft, nontender, +BS  Ext: No LE edema, no wounds  : No Tena  IV/Skin: No thrombophlebitis  Neuro: AAOx1    LABS:                          11.0   14.24 )-----------( 293      ( 24 Dec 2017 07:00 )             33.5       12-24    138  |  101  |  21  ----------------------------<  127<H>  3.3<L>   |  24  |  1.03    Ca    8.6      24 Dec 2017 07:00  Mg     2.1           Urinalysis Basic - ( 22 Dec 2017 19:00 )    Color: YELLOW / Appearance: CLEAR / S.024 / pH: 7.0  Gluc: NEGATIVE / Ketone: NEGATIVE  / Bili: NEGATIVE / Urobili: 4 mg/dL   Blood: TRACE / Protein: 300 mg/dL / Nitrite: NEGATIVE   Leuk Esterase: NEGATIVE / RBC: 5-10 / WBC 0-2   Sq Epi: x / Non Sq Epi: x / Bacteria: x    MICROBIOLOGY:  v  URINE MIDSTREAM  17 --  --  --      BLOOD PERIPHERAL  17 --  --  --    RADIOLOGY:    < from: Xray Chest 1 View AP- PORTABLE-Urgent (17 @ 13:42) >  IMPRESSION:    Clear lungs.      < end of copied text >

## 2017-12-24 NOTE — PROGRESS NOTE ADULT - ASSESSMENT
Pt is a 77 year old man with unknown PMH BIBEMS found outside shoveling snow soaking wet by neighbors, brought to ED, where he had 2 witnessed seizures, treated w/ Ativan and started on keppra, dose increased to 750 mg BID given 2 noted breakthrough seizures on 12/18. 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     Recommendations:  -vEEG can be discontinued   - Repeat CT head 12/24  -c/w keppra 1500mg bid  -c/w phosphenytoin 100 TID  -c/w vimpat 200 BID  -c/w infectious w/u  -Discussed with step daughter [phone] (HCP) the need for an LP and she declined to consent. Would prefer if we made him as comfortable, seizure free and fever free as possible. Does not feel an LP would improve his outcomes (even if it could potentially help narrow down a cause of infection)

## 2017-12-24 NOTE — PROGRESS NOTE ADULT - PROBLEM SELECTOR PLAN 1
- EEG today shows improvement. Findings indicate the risk of focal seizures from the right hemisphere, with greatest prominence in the right fronto-central region. There is also evidence of a moderate diffuse encephalopathy with a greater degree of dysfunction in the right hemisphere. No clear seizures were recorded.   - Hopeful to be able to get MRI with nohemy to eval for structural lesion soon (pt appears more stable today), Consent obtained by PA, in chart.  - Neuro input appreciated  - c/w current regimen of AEDs

## 2017-12-24 NOTE — PROGRESS NOTE ADULT - SUBJECTIVE AND OBJECTIVE BOX
Neurology Follow up note    Objective:   Vital Signs Last 24 Hrs  T(C): 37.4 (24 Dec 2017 10:14), Max: 38.2 (23 Dec 2017 16:29)  T(F): 99.3 (24 Dec 2017 10:14), Max: 100.8 (23 Dec 2017 16:29)  HR: 53 (24 Dec 2017 13:01) (53 - 65)  BP: 133/74 (24 Dec 2017 13:01) (112/51 - 133/74)  BP(mean): --  RR: 18 (24 Dec 2017 13:01) (18 - 20)  SpO2: 97% (24 Dec 2017 13:01) (94% - 98%)    General Exam:   General appearance: No acute distress                 Neurological Exam:  Mental Status: Awake and following simple commands (improved from yesterday). Moving spontaneously. Orientated to self, date and place. Attention intact. Copious secretions in throat but no discernible dysarthria, aphasia or neglect.    Cranial Nerves:  PERRL, EOMI, VFF grossly, no nystagmus or diplopia. there is flattening of the left nasal labial fold.   Motor: 3 over 5 left hemiparesis.  No involuntary movements of the left lower extremity  Tone: decreased tone on the left         Strength: left hemiparesis  Pronator drift: left-sided drift             Tremor: No resting, postural or action tremor.  No myoclonus.    Deep Tendon Reflexes: left reflex preponderance with a neutral plantar response    Labs                11.0   14.24 )-----------( 293      ( 24 Dec 2017 07:00 )             33.5   12-24    138  |  101  |  21  ----------------------------<  127<H>  3.3<L>   |  24  |  1.03    Ca    8.6      24 Dec 2017 07:00  Mg     2.1     12-24    MEDICATIONS  (STANDING):  acyclovir IVPB      acyclovir IVPB 700 milliGRAM(s) IV Intermittent every 12 hours  ampicillin  IVPB 2 Gram(s) IV Intermittent every 6 hours  cefTRIAXone   IVPB 2 Gram(s) IV Intermittent every 12 hours  cefTRIAXone   IVPB      dextrose 5%. 1000 milliLiter(s) (50 mL/Hr) IV Continuous <Continuous>  fosphenytoin IVPB 100 milliGRAM(s) PE IV Intermittent every 8 hours  heparin  Injectable 5000 Unit(s) SubCutaneous every 12 hours  lacosamide IVPB      lacosamide IVPB 200 milliGRAM(s) IV Intermittent every 12 hours  levETIRAcetam  IVPB 1500 milliGRAM(s) IV Intermittent every 12 hours  multivitamin 1 Tablet(s) Oral daily  potassium chloride  10 mEq/100 mL IVPB 10 milliEquivalent(s) IV Intermittent every 1 hour  sodium chloride 0.9% lock flush 3 milliLiter(s) IV Push every 8 hours  vancomycin  IVPB      vancomycin  IVPB 1250 milliGRAM(s) IV Intermittent every 24 hours    MEDICATIONS  (PRN):  acetaminophen  Suppository 650 milliGRAM(s) Rectal every 6 hours PRN For Temp greater than 38 C (100.4 F)  sodium chloride 0.65% Nasal 1 Spray(s) Both Nostrils four times a day PRN Congestion

## 2017-12-24 NOTE — PROGRESS NOTE ADULT - SUBJECTIVE AND OBJECTIVE BOX
Patient is a 77y old  Male who presents with a chief complaint of Seizure x 1 day (16 Dec 2017 01:26)      SUBJECTIVE / OVERNIGHT EVENTS: No acute overnight events. Pt more awake and interactive today but not answering ROS questions appropriately.    MEDICATIONS  (STANDING):  acyclovir IVPB      acyclovir IVPB 700 milliGRAM(s) IV Intermittent every 12 hours  ampicillin  IVPB 2 Gram(s) IV Intermittent every 6 hours  cefTRIAXone   IVPB 2 Gram(s) IV Intermittent every 12 hours  cefTRIAXone   IVPB      dextrose 5%. 1000 milliLiter(s) (50 mL/Hr) IV Continuous <Continuous>  fosphenytoin IVPB 100 milliGRAM(s) PE IV Intermittent every 8 hours  heparin  Injectable 5000 Unit(s) SubCutaneous every 12 hours  lacosamide IVPB      lacosamide IVPB 200 milliGRAM(s) IV Intermittent every 12 hours  levETIRAcetam  IVPB 1500 milliGRAM(s) IV Intermittent every 12 hours  multivitamin 1 Tablet(s) Oral daily  potassium chloride  10 mEq/100 mL IVPB 10 milliEquivalent(s) IV Intermittent every 1 hour  sodium chloride 0.9% lock flush 3 milliLiter(s) IV Push every 8 hours  vancomycin  IVPB      vancomycin  IVPB 1250 milliGRAM(s) IV Intermittent every 24 hours    MEDICATIONS  (PRN):  acetaminophen  Suppository 650 milliGRAM(s) Rectal every 6 hours PRN For Temp greater than 38 C (100.4 F)  sodium chloride 0.65% Nasal 1 Spray(s) Both Nostrils four times a day PRN Congestion      Vital Signs Last 24 Hrs  T(C): 37.4 (24 Dec 2017 10:14), Max: 38.2 (23 Dec 2017 16:29)  T(F): 99.3 (24 Dec 2017 10:14), Max: 100.8 (23 Dec 2017 16:29)  HR: 53 (24 Dec 2017 13:01) (53 - 65)  BP: 133/74 (24 Dec 2017 13:01) (112/51 - 133/74)  BP(mean): --  RR: 18 (24 Dec 2017 13:01) (18 - 20)  SpO2: 97% (24 Dec 2017 13:01) (94% - 98%)  CAPILLARY BLOOD GLUCOSE        I&O's Summary      PHYSICAL EXAM:  GENERAL: NAD, well-developed  HEAD:  Atraumatic, Normocephalic  EYES: EOMI, PERRLA, conjunctiva and sclera clear  NECK: Supple, No JVD  CHEST/LUNG: Clear to auscultation bilaterally; No wheeze  HEART: Regular rate and rhythm; No murmurs, rubs, or gallops  ABDOMEN: Soft, Nontender, Nondistended; Bowel sounds present  EXTREMITIES:  2+ Peripheral Pulses, No clubbing, cyanosis, or edema  PSYCH: AAOx3  NEUROLOGY: L-hemiparesis. CN intact. Follows commands.  SKIN: No rashes or lesions    LABS:                        11.0   14.24 )-----------( 293      ( 24 Dec 2017 07:00 )             33.5     12-24    138  |  101  |  21  ----------------------------<  127<H>  3.3<L>   |  24  |  1.03    Ca    8.6      24 Dec 2017 07:00  Mg     2.1     12-24            Urinalysis Basic - ( 22 Dec 2017 19:00 )    Color: YELLOW / Appearance: CLEAR / S.024 / pH: 7.0  Gluc: NEGATIVE / Ketone: NEGATIVE  / Bili: NEGATIVE / Urobili: 4 mg/dL   Blood: TRACE / Protein: 300 mg/dL / Nitrite: NEGATIVE   Leuk Esterase: NEGATIVE / RBC: 5-10 / WBC 0-2   Sq Epi: x / Non Sq Epi: x / Bacteria: x        RADIOLOGY & ADDITIONAL TESTS:    Imaging Personally Reviewed:    Consultant(s) Notes Reviewed:  ID    Care Discussed with Consultants/Other Providers:

## 2017-12-25 DIAGNOSIS — E87.1 HYPO-OSMOLALITY AND HYPONATREMIA: ICD-10-CM

## 2017-12-25 LAB
BUN SERPL-MCNC: 17 MG/DL — SIGNIFICANT CHANGE UP (ref 7–23)
CALCIUM SERPL-MCNC: 8.4 MG/DL — SIGNIFICANT CHANGE UP (ref 8.4–10.5)
CHLORIDE SERPL-SCNC: 98 MMOL/L — SIGNIFICANT CHANGE UP (ref 98–107)
CO2 SERPL-SCNC: 22 MMOL/L — SIGNIFICANT CHANGE UP (ref 22–31)
CREAT SERPL-MCNC: 0.94 MG/DL — SIGNIFICANT CHANGE UP (ref 0.5–1.3)
GLUCOSE SERPL-MCNC: 120 MG/DL — HIGH (ref 70–99)
HCT VFR BLD CALC: 34.1 % — LOW (ref 39–50)
HGB BLD-MCNC: 11.1 G/DL — LOW (ref 13–17)
HIV 1+2 AB+HIV1 P24 AG SERPL QL IA: SIGNIFICANT CHANGE UP
MCHC RBC-ENTMCNC: 28.6 PG — SIGNIFICANT CHANGE UP (ref 27–34)
MCHC RBC-ENTMCNC: 32.6 % — SIGNIFICANT CHANGE UP (ref 32–36)
MCV RBC AUTO: 87.9 FL — SIGNIFICANT CHANGE UP (ref 80–100)
NRBC # FLD: 0 — SIGNIFICANT CHANGE UP
OSMOLALITY UR: 255 MOSMO/KG — SIGNIFICANT CHANGE UP (ref 50–1200)
PLATELET # BLD AUTO: 344 K/UL — SIGNIFICANT CHANGE UP (ref 150–400)
PMV BLD: 9.3 FL — SIGNIFICANT CHANGE UP (ref 7–13)
POTASSIUM SERPL-MCNC: 3.5 MMOL/L — SIGNIFICANT CHANGE UP (ref 3.5–5.3)
POTASSIUM SERPL-SCNC: 3.5 MMOL/L — SIGNIFICANT CHANGE UP (ref 3.5–5.3)
RBC # BLD: 3.88 M/UL — LOW (ref 4.2–5.8)
RBC # FLD: 14 % — SIGNIFICANT CHANGE UP (ref 10.3–14.5)
SODIUM SERPL-SCNC: 134 MMOL/L — LOW (ref 135–145)
SODIUM UR-SCNC: 52 MEQ/L — SIGNIFICANT CHANGE UP
WBC # BLD: 11.73 K/UL — HIGH (ref 3.8–10.5)
WBC # FLD AUTO: 11.73 K/UL — HIGH (ref 3.8–10.5)

## 2017-12-25 PROCEDURE — 99233 SBSQ HOSP IP/OBS HIGH 50: CPT

## 2017-12-25 RX ADMIN — SODIUM CHLORIDE 50 MILLILITER(S): 9 INJECTION, SOLUTION INTRAVENOUS at 17:13

## 2017-12-25 RX ADMIN — CEFTRIAXONE 100 GRAM(S): 500 INJECTION, POWDER, FOR SOLUTION INTRAMUSCULAR; INTRAVENOUS at 17:12

## 2017-12-25 RX ADMIN — CEFTRIAXONE 100 GRAM(S): 500 INJECTION, POWDER, FOR SOLUTION INTRAMUSCULAR; INTRAVENOUS at 05:14

## 2017-12-25 RX ADMIN — Medication 264 MILLIGRAM(S): at 17:12

## 2017-12-25 RX ADMIN — Medication 216 GRAM(S): at 02:50

## 2017-12-25 RX ADMIN — LACOSAMIDE 140 MILLIGRAM(S): 50 TABLET ORAL at 08:41

## 2017-12-25 RX ADMIN — SODIUM CHLORIDE 3 MILLILITER(S): 9 INJECTION INTRAMUSCULAR; INTRAVENOUS; SUBCUTANEOUS at 22:00

## 2017-12-25 RX ADMIN — FOSPHENYTOIN 104 MILLIGRAM(S) PE: 50 INJECTION INTRAMUSCULAR; INTRAVENOUS at 22:00

## 2017-12-25 RX ADMIN — FOSPHENYTOIN 104 MILLIGRAM(S) PE: 50 INJECTION INTRAMUSCULAR; INTRAVENOUS at 06:57

## 2017-12-25 RX ADMIN — Medication 216 GRAM(S): at 15:47

## 2017-12-25 RX ADMIN — HEPARIN SODIUM 5000 UNIT(S): 5000 INJECTION INTRAVENOUS; SUBCUTANEOUS at 17:13

## 2017-12-25 RX ADMIN — Medication 166.67 MILLIGRAM(S): at 11:16

## 2017-12-25 RX ADMIN — Medication 216 GRAM(S): at 08:42

## 2017-12-25 RX ADMIN — Medication 264 MILLIGRAM(S): at 05:19

## 2017-12-25 RX ADMIN — LEVETIRACETAM 400 MILLIGRAM(S): 250 TABLET, FILM COATED ORAL at 08:42

## 2017-12-25 RX ADMIN — LEVETIRACETAM 400 MILLIGRAM(S): 250 TABLET, FILM COATED ORAL at 20:53

## 2017-12-25 RX ADMIN — FOSPHENYTOIN 104 MILLIGRAM(S) PE: 50 INJECTION INTRAMUSCULAR; INTRAVENOUS at 17:12

## 2017-12-25 RX ADMIN — LACOSAMIDE 140 MILLIGRAM(S): 50 TABLET ORAL at 21:41

## 2017-12-25 RX ADMIN — HEPARIN SODIUM 5000 UNIT(S): 5000 INJECTION INTRAVENOUS; SUBCUTANEOUS at 05:19

## 2017-12-25 RX ADMIN — SODIUM CHLORIDE 3 MILLILITER(S): 9 INJECTION INTRAMUSCULAR; INTRAVENOUS; SUBCUTANEOUS at 15:46

## 2017-12-25 RX ADMIN — Medication 216 GRAM(S): at 21:43

## 2017-12-25 RX ADMIN — SODIUM CHLORIDE 3 MILLILITER(S): 9 INJECTION INTRAMUSCULAR; INTRAVENOUS; SUBCUTANEOUS at 06:58

## 2017-12-25 NOTE — PROGRESS NOTE ADULT - ATTENDING COMMENTS
The patient is more awake, alert and cooperative today.   He continues to demonstrate weakness in the left upper extremity with a flattened  left nasal labial fold.  The CT scan of the head did not reveal an acute stroke.  the weakness may be related to a postictal  Mal's paralysis or  a small brainstem event.  There have been no witnessed seizures.  Continue with three AED's and follow [facility] level. For MRI head.

## 2017-12-25 NOTE — PROGRESS NOTE ADULT - ASSESSMENT
Pt is a 77 year old man with unknown PMH BIBEMS found outside shoveling snow soaking wet by neighbors, brought to ED, where he had 2 witnessed seizures, treated w/ Ativan and started on keppra, dose increased to 750 mg BID given 2 noted breakthrough seizures on 12/18. 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  On neuro exam, patient more awake and alert. CT head today showed no intracranial hemorrhage, mass effect, or midline shift. Mild ventriculomegaly superimposed on cerebral volume loss, unchanged.     Left sided hemiparesis may be secondary to Mal's paralysis vs CVA    Recommendations:  -MRI brain without contrast  -Dilantin level  -c/w keppra 1500mg bid  -c/w phosphenytoin 100 TID  -c/w vimpat 200 BID  -c/w infectious w/u

## 2017-12-25 NOTE — PROGRESS NOTE ADULT - SUBJECTIVE AND OBJECTIVE BOX
Patient is a 77y old  Male who presents with a chief complaint of Seizure x 1 day (16 Dec 2017 01:26)      SUBJECTIVE / OVERNIGHT EVENTS: No acute events. Pt more awake today. When asked if in any pain/discomfort pt states no.    MEDICATIONS  (STANDING):  acyclovir IVPB      acyclovir IVPB 700 milliGRAM(s) IV Intermittent every 12 hours  ampicillin  IVPB 2 Gram(s) IV Intermittent every 6 hours  cefTRIAXone   IVPB 2 Gram(s) IV Intermittent every 12 hours  cefTRIAXone   IVPB      dextrose 5%. 1000 milliLiter(s) (50 mL/Hr) IV Continuous <Continuous>  fosphenytoin IVPB 100 milliGRAM(s) PE IV Intermittent every 8 hours  heparin  Injectable 5000 Unit(s) SubCutaneous every 12 hours  lacosamide IVPB      lacosamide IVPB 200 milliGRAM(s) IV Intermittent every 12 hours  levETIRAcetam  IVPB 1500 milliGRAM(s) IV Intermittent every 12 hours  multivitamin 1 Tablet(s) Oral daily  sodium chloride 0.9% lock flush 3 milliLiter(s) IV Push every 8 hours  vancomycin  IVPB      vancomycin  IVPB 1250 milliGRAM(s) IV Intermittent every 24 hours    MEDICATIONS  (PRN):  acetaminophen  Suppository 650 milliGRAM(s) Rectal every 6 hours PRN For Temp greater than 38 C (100.4 F)  sodium chloride 0.65% Nasal 1 Spray(s) Both Nostrils four times a day PRN Congestion      Vital Signs Last 24 Hrs  T(C): 36.8 (25 Dec 2017 11:15), Max: 36.8 (24 Dec 2017 21:15)  T(F): 98.2 (25 Dec 2017 11:15), Max: 98.3 (25 Dec 2017 05:43)  HR: 60 (25 Dec 2017 11:15) (52 - 60)  BP: 151/68 (25 Dec 2017 11:15) (133/74 - 151/68)  BP(mean): --  RR: 18 (25 Dec 2017 11:15) (18 - 18)  SpO2: 94% (25 Dec 2017 11:15) (94% - 97%)  CAPILLARY BLOOD GLUCOSE        I&O's Summary      PHYSICAL EXAM:  GENERAL: NAD, well-developed  HEAD:  Atraumatic, Normocephalic  EYES: EOMI, PERRLA, conjunctiva and sclera clear  NECK: Supple, No JVD  CHEST/LUNG: Clear to auscultation bilaterally; No wheeze  HEART: Regular rate and rhythm; No murmurs, rubs, or gallops  ABDOMEN: Soft, Nontender, Nondistended; Bowel sounds present  EXTREMITIES:  2+ Peripheral Pulses, No clubbing, cyanosis, or edema  PSYCH: AAOx1  NEUROLOGY: More awake, follows commands, L hemiparesis  SKIN: No rashes or lesions    LABS:                        11.1   11.73 )-----------( 344      ( 25 Dec 2017 06:25 )             34.1     12-25    134<L>  |  98  |  17  ----------------------------<  120<H>  3.5   |  22  |  0.94    Ca    8.4      25 Dec 2017 06:25  Mg     2.1     12-24                RADIOLOGY & ADDITIONAL TESTS:    Imaging Personally Reviewed:    Consultant(s) Notes Reviewed:  ID, neurology    Care Discussed with Consultants/Other Providers: Neurology

## 2017-12-25 NOTE — PROGRESS NOTE ADULT - PROBLEM SELECTOR PLAN 1
- No seizures observed last 24 hours. Last EEG showed no seizures. Neuro following, input appreciated.  - Pt appears stable enough for MRI brain at this point. Consent obtained by PA, in chart.  - c/w current regimen of AEDs, check Phenytoin level

## 2017-12-25 NOTE — PROGRESS NOTE ADULT - SUBJECTIVE AND OBJECTIVE BOX
Subjective:Interval History - No events overnight    Objective:   Vital Signs Last 24 Hrs  T(C): 36.8 (25 Dec 2017 11:15), Max: 36.8 (24 Dec 2017 21:15)  T(F): 98.2 (25 Dec 2017 11:15), Max: 98.3 (25 Dec 2017 05:43)  HR: 60 (25 Dec 2017 11:15) (52 - 60)  BP: 151/68 (25 Dec 2017 11:15) (133/74 - 151/68)  RR: 18 (25 Dec 2017 11:15) (18 - 18)  SpO2: 94% (25 Dec 2017 11:15) (94% - 97%)    General Exam:   General appearance: No acute distress                   Neurological Exam:  Mental Status: Orientated to self and hospital.  Attention intact.  No dysarthria, aphasia or neglect.     Cranial Nerves: PERRL, EOMI, blinks to threat bilaterally, no nystagmus or diplopia.  No facial asymmetry.     Motor:   Tone: normal.                  Strength: LUE 0/5, moving RUE spontaneously    Other:    12-25    134<L>  |  98  |  17  ----------------------------<  120<H>  3.5   |  22  |  0.94    Ca    8.4      25 Dec 2017 06:25  Mg     2.1     12-24                        11.1   11.73 )-----------( 344      ( 25 Dec 2017 06:25 )             34.1     MEDICATIONS  (STANDING):  acyclovir IVPB      acyclovir IVPB 700 milliGRAM(s) IV Intermittent every 12 hours  ampicillin  IVPB 2 Gram(s) IV Intermittent every 6 hours  cefTRIAXone   IVPB 2 Gram(s) IV Intermittent every 12 hours  cefTRIAXone   IVPB      dextrose 5%. 1000 milliLiter(s) (50 mL/Hr) IV Continuous <Continuous>  fosphenytoin IVPB 100 milliGRAM(s) PE IV Intermittent every 8 hours  heparin  Injectable 5000 Unit(s) SubCutaneous every 12 hours  lacosamide IVPB      lacosamide IVPB 200 milliGRAM(s) IV Intermittent every 12 hours  levETIRAcetam  IVPB 1500 milliGRAM(s) IV Intermittent every 12 hours  multivitamin 1 Tablet(s) Oral daily  sodium chloride 0.9% lock flush 3 milliLiter(s) IV Push every 8 hours  vancomycin  IVPB      vancomycin  IVPB 1250 milliGRAM(s) IV Intermittent every 24 hours    MEDICATIONS  (PRN):  acetaminophen  Suppository 650 milliGRAM(s) Rectal every 6 hours PRN For Temp greater than 38 C (100.4 F)  sodium chloride 0.65% Nasal 1 Spray(s) Both Nostrils four times a day PRN Congestion

## 2017-12-25 NOTE — PROGRESS NOTE ADULT - ASSESSMENT
78 yo M with dementia (unclear baseline), p/w episode of obtundation with course c/b seizures x 2 in the ED that initially resolved with Ativan. 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 Hospital course now complicated by fever, SIRS, cannot r/o meningitis vs encephalitis.

## 2017-12-25 NOTE — PROGRESS NOTE ADULT - PROBLEM SELECTOR PLAN 2
- Pt afebrile, WBC downtrending. ID recs appreciated.  - So far all cultures have been negative  - Concern for underlying viral illness vs HSV encephalitis  - Decision maker for pt is Chelita (step-daughter) who now states if pt is ok with an LP she will consent to it. Will see if pt has understanding of this decision and discuss further with Chelita. Hopeful to arrange for LP early this week if pt and Chelita agreeable.  - c/w Vancomycin (monitor troughs), Ceftriaxone, Acyclovir, Ampicillin  - Check cryptococcal, [place] antigens, HIV

## 2017-12-25 NOTE — PROGRESS NOTE ADULT - PROBLEM SELECTOR PLAN 3
- Na has declined from 149 on 12/21 to 134 today  - Check serum/urine osm, urine Na  - Trend Na level

## 2017-12-26 LAB
BUN SERPL-MCNC: 15 MG/DL — SIGNIFICANT CHANGE UP (ref 7–23)
CALCIUM SERPL-MCNC: 8.5 MG/DL — SIGNIFICANT CHANGE UP (ref 8.4–10.5)
CHLORIDE SERPL-SCNC: 93 MMOL/L — LOW (ref 98–107)
CO2 SERPL-SCNC: 22 MMOL/L — SIGNIFICANT CHANGE UP (ref 22–31)
CREAT SERPL-MCNC: 0.86 MG/DL — SIGNIFICANT CHANGE UP (ref 0.5–1.3)
GLUCOSE SERPL-MCNC: 145 MG/DL — HIGH (ref 70–99)
HCT VFR BLD CALC: 42.8 % — SIGNIFICANT CHANGE UP (ref 39–50)
HGB BLD-MCNC: 14.7 G/DL — SIGNIFICANT CHANGE UP (ref 13–17)
MCHC RBC-ENTMCNC: 30.2 PG — SIGNIFICANT CHANGE UP (ref 27–34)
MCHC RBC-ENTMCNC: 34.3 % — SIGNIFICANT CHANGE UP (ref 32–36)
MCV RBC AUTO: 87.9 FL — SIGNIFICANT CHANGE UP (ref 80–100)
NRBC # FLD: 0 — SIGNIFICANT CHANGE UP
OSMOLALITY SERPL: 277 MOSMO/KG — SIGNIFICANT CHANGE UP (ref 275–295)
PHENYTOIN FREE SERPL-MCNC: 2.3 MG/L — HIGH (ref 1–2)
PHENYTOIN FREE SERPL-MCNC: 8.1 UG/ML — LOW (ref 10–20)
PLATELET # BLD AUTO: 429 K/UL — HIGH (ref 150–400)
PMV BLD: SIGNIFICANT CHANGE UP FL (ref 7–13)
POTASSIUM SERPL-MCNC: 4.1 MMOL/L — SIGNIFICANT CHANGE UP (ref 3.5–5.3)
POTASSIUM SERPL-SCNC: 4.1 MMOL/L — SIGNIFICANT CHANGE UP (ref 3.5–5.3)
RBC # BLD: 4.87 M/UL — SIGNIFICANT CHANGE UP (ref 4.2–5.8)
RBC # FLD: SIGNIFICANT CHANGE UP % (ref 10.3–14.5)
SODIUM SERPL-SCNC: 131 MMOL/L — LOW (ref 135–145)
VANCOMYCIN TROUGH SERPL-MCNC: 13.9 UG/ML — SIGNIFICANT CHANGE UP (ref 10–20)
WBC # BLD: 13.49 K/UL — HIGH (ref 3.8–10.5)
WBC # FLD AUTO: 13.49 K/UL — HIGH (ref 3.8–10.5)

## 2017-12-26 PROCEDURE — 99232 SBSQ HOSP IP/OBS MODERATE 35: CPT | Mod: GC

## 2017-12-26 PROCEDURE — 70551 MRI BRAIN STEM W/O DYE: CPT | Mod: 26

## 2017-12-26 PROCEDURE — 99233 SBSQ HOSP IP/OBS HIGH 50: CPT

## 2017-12-26 RX ORDER — HYDRALAZINE HCL 50 MG
5 TABLET ORAL EVERY 6 HOURS
Qty: 0 | Refills: 0 | Status: DISCONTINUED | OUTPATIENT
Start: 2017-12-26 | End: 2017-12-29

## 2017-12-26 RX ORDER — ATORVASTATIN CALCIUM 80 MG/1
80 TABLET, FILM COATED ORAL AT BEDTIME
Qty: 0 | Refills: 0 | Status: DISCONTINUED | OUTPATIENT
Start: 2017-12-26 | End: 2018-01-07

## 2017-12-26 RX ORDER — ASPIRIN/CALCIUM CARB/MAGNESIUM 324 MG
81 TABLET ORAL DAILY
Qty: 0 | Refills: 0 | Status: DISCONTINUED | OUTPATIENT
Start: 2017-12-26 | End: 2017-12-26

## 2017-12-26 RX ORDER — SODIUM CHLORIDE 9 MG/ML
1000 INJECTION, SOLUTION INTRAVENOUS
Qty: 0 | Refills: 0 | Status: DISCONTINUED | OUTPATIENT
Start: 2017-12-26 | End: 2017-12-29

## 2017-12-26 RX ADMIN — CEFTRIAXONE 100 GRAM(S): 500 INJECTION, POWDER, FOR SOLUTION INTRAMUSCULAR; INTRAVENOUS at 05:34

## 2017-12-26 RX ADMIN — SODIUM CHLORIDE 50 MILLILITER(S): 9 INJECTION, SOLUTION INTRAVENOUS at 14:04

## 2017-12-26 RX ADMIN — HEPARIN SODIUM 5000 UNIT(S): 5000 INJECTION INTRAVENOUS; SUBCUTANEOUS at 05:35

## 2017-12-26 RX ADMIN — FOSPHENYTOIN 104 MILLIGRAM(S) PE: 50 INJECTION INTRAMUSCULAR; INTRAVENOUS at 05:34

## 2017-12-26 RX ADMIN — SODIUM CHLORIDE 3 MILLILITER(S): 9 INJECTION INTRAMUSCULAR; INTRAVENOUS; SUBCUTANEOUS at 05:33

## 2017-12-26 RX ADMIN — Medication 216 GRAM(S): at 14:05

## 2017-12-26 RX ADMIN — SODIUM CHLORIDE 3 MILLILITER(S): 9 INJECTION INTRAMUSCULAR; INTRAVENOUS; SUBCUTANEOUS at 13:57

## 2017-12-26 RX ADMIN — LEVETIRACETAM 400 MILLIGRAM(S): 250 TABLET, FILM COATED ORAL at 20:34

## 2017-12-26 RX ADMIN — SODIUM CHLORIDE 3 MILLILITER(S): 9 INJECTION INTRAMUSCULAR; INTRAVENOUS; SUBCUTANEOUS at 22:43

## 2017-12-26 RX ADMIN — LACOSAMIDE 140 MILLIGRAM(S): 50 TABLET ORAL at 09:17

## 2017-12-26 RX ADMIN — HEPARIN SODIUM 5000 UNIT(S): 5000 INJECTION INTRAVENOUS; SUBCUTANEOUS at 18:34

## 2017-12-26 RX ADMIN — LEVETIRACETAM 400 MILLIGRAM(S): 250 TABLET, FILM COATED ORAL at 08:15

## 2017-12-26 RX ADMIN — LACOSAMIDE 140 MILLIGRAM(S): 50 TABLET ORAL at 22:43

## 2017-12-26 RX ADMIN — Medication 264 MILLIGRAM(S): at 05:33

## 2017-12-26 RX ADMIN — Medication 264 MILLIGRAM(S): at 17:40

## 2017-12-26 RX ADMIN — Medication 216 GRAM(S): at 20:40

## 2017-12-26 RX ADMIN — CEFTRIAXONE 100 GRAM(S): 500 INJECTION, POWDER, FOR SOLUTION INTRAMUSCULAR; INTRAVENOUS at 17:40

## 2017-12-26 RX ADMIN — Medication 216 GRAM(S): at 02:52

## 2017-12-26 RX ADMIN — FOSPHENYTOIN 104 MILLIGRAM(S) PE: 50 INJECTION INTRAMUSCULAR; INTRAVENOUS at 23:16

## 2017-12-26 RX ADMIN — Medication 166.67 MILLIGRAM(S): at 11:49

## 2017-12-26 RX ADMIN — Medication 216 GRAM(S): at 08:40

## 2017-12-26 RX ADMIN — FOSPHENYTOIN 104 MILLIGRAM(S) PE: 50 INJECTION INTRAMUSCULAR; INTRAVENOUS at 14:05

## 2017-12-26 NOTE — PROGRESS NOTE ADULT - SUBJECTIVE AND OBJECTIVE BOX
Subjective:Interval History - No events overnight    Objective:   Vital Signs Last 24 Hrs  T(C): 36.7 (26 Dec 2017 05:32), Max: 36.8 (25 Dec 2017 11:15)  T(F): 98 (26 Dec 2017 05:32), Max: 98.2 (25 Dec 2017 11:15)  HR: 66 (26 Dec 2017 05:32) (60 - 66)  BP: 160/84 (26 Dec 2017 05:32) (151/68 - 160/84)  BP(mean): --  RR: 20 (26 Dec 2017 05:32) (10 - 20)  SpO2: 96% (26 Dec 2017 05:32) (94% - 98%)    General Exam:   General appearance: No acute distress                   Neurological Exam:  Mental Status: Orientated to self and hospital.  Attention intact.  No dysarthria, aphasia or neglect.     Cranial Nerves: PERRL, EOMI, blinks to threat bilaterally, no nystagmus or diplopia.  No facial asymmetry.     Motor:   Tone: normal.                  Strength: LUE 3/5, moving RUE spontaneously, not moving LE    Other:    MEDICATIONS  (STANDING):  acyclovir IVPB      acyclovir IVPB 700 milliGRAM(s) IV Intermittent every 12 hours  ampicillin  IVPB 2 Gram(s) IV Intermittent every 6 hours  cefTRIAXone   IVPB 2 Gram(s) IV Intermittent every 12 hours  cefTRIAXone   IVPB      dextrose 5%. 1000 milliLiter(s) (50 mL/Hr) IV Continuous <Continuous>  fosphenytoin IVPB 100 milliGRAM(s) PE IV Intermittent every 8 hours  heparin  Injectable 5000 Unit(s) SubCutaneous every 12 hours  lacosamide IVPB      lacosamide IVPB 200 milliGRAM(s) IV Intermittent every 12 hours  levETIRAcetam  IVPB 1500 milliGRAM(s) IV Intermittent every 12 hours  multivitamin 1 Tablet(s) Oral daily  sodium chloride 0.9% lock flush 3 milliLiter(s) IV Push every 8 hours  vancomycin  IVPB      vancomycin  IVPB 1250 milliGRAM(s) IV Intermittent every 24 hours    MEDICATIONS  (PRN):  acetaminophen  Suppository 650 milliGRAM(s) Rectal every 6 hours PRN For Temp greater than 38 C (100.4 F)  sodium chloride 0.65% Nasal 1 Spray(s) Both Nostrils four times a day PRN Congestion Subjective:Interval History - No events overnight    Objective:   Vital Signs Last 24 Hrs  T(C): 36.7 (26 Dec 2017 05:32), Max: 36.8 (25 Dec 2017 11:15)  T(F): 98 (26 Dec 2017 05:32), Max: 98.2 (25 Dec 2017 11:15)  HR: 66 (26 Dec 2017 05:32) (60 - 66)  BP: 160/84 (26 Dec 2017 05:32) (151/68 - 160/84)  BP(mean): --  RR: 20 (26 Dec 2017 05:32) (10 - 20)  SpO2: 96% (26 Dec 2017 05:32) (94% - 98%)    General Exam:   General appearance: No acute distress                   Neurological Exam:  Mental Status: Orientated to self and hospital.   No dysarthria, aphasia or neglect. Difficulty following simple commands.     Cranial Nerves: PERRL, EOMI, blinks to threat bilaterally, no nystagmus or diplopia.  Decreased right naso-labial fold.     Motor: rare involuntary movement of the LLE and left thumb.   Tone: Increased tone LUE.                   Strength: LUE 3/5, moving RUE spontaneously, not moving LE    Other:    MEDICATIONS  (STANDING):  acyclovir IVPB      acyclovir IVPB 700 milliGRAM(s) IV Intermittent every 12 hours  ampicillin  IVPB 2 Gram(s) IV Intermittent every 6 hours  cefTRIAXone   IVPB 2 Gram(s) IV Intermittent every 12 hours  cefTRIAXone   IVPB      dextrose 5%. 1000 milliLiter(s) (50 mL/Hr) IV Continuous <Continuous>  fosphenytoin IVPB 100 milliGRAM(s) PE IV Intermittent every 8 hours  heparin  Injectable 5000 Unit(s) SubCutaneous every 12 hours  lacosamide IVPB      lacosamide IVPB 200 milliGRAM(s) IV Intermittent every 12 hours  levETIRAcetam  IVPB 1500 milliGRAM(s) IV Intermittent every 12 hours  multivitamin 1 Tablet(s) Oral daily  sodium chloride 0.9% lock flush 3 milliLiter(s) IV Push every 8 hours  vancomycin  IVPB      vancomycin  IVPB 1250 milliGRAM(s) IV Intermittent every 24 hours    MEDICATIONS  (PRN):  acetaminophen  Suppository 650 milliGRAM(s) Rectal every 6 hours PRN For Temp greater than 38 C (100.4 F)  sodium chloride 0.65% Nasal 1 Spray(s) Both Nostrils four times a day PRN Congestion

## 2017-12-26 NOTE — SWALLOW BEDSIDE ASSESSMENT ADULT - COMMENTS
Attempted to see pt for initial assessment of swallow function this am at which time pt's RN reports pt is off unit at MRI. This dept to continue to f/u as schedule permits for formal assessment of swallow function.

## 2017-12-26 NOTE — PROGRESS NOTE ADULT - ASSESSMENT
Pt is a 77 year old man with unknown PMH BIBEMS found outside shoveling snow soaking wet by neighbors, brought to ED, where he had 2 witnessed seizures, treated w/ Ativan and started on keppra, dose increased to 750 mg BID given 2 noted breakthrough seizures on 12/18. 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  On neuro exam, patient more awake and alert. CT head today showed no intracranial hemorrhage, mass effect, or midline shift. Mild ventriculomegaly superimposed on cerebral volume loss, unchanged.     Left sided hemiparesis resolving but still having muscle twitching in LLE    Recommendations:  -MRI brain without contrast  -c/w keppra 1500mg bid  -c/w phosphenytoin 100 TID  -c/w vimpat 200 BID  -c/w infectious w/u  -Routine EEG

## 2017-12-26 NOTE — PROGRESS NOTE ADULT - ATTENDING COMMENTS
Left hemiparesis still of unclear etiology.  More awake and alert today.  Some involuntary movements of the ISELA and LL extremities that are not clearly ictal.  Will obtain F/U EEG and MRI head. Continue on present AED's.  Follow DPH levels.

## 2017-12-26 NOTE — SWALLOW BEDSIDE ASSESSMENT ADULT - COMMENTS
Pt was awake though lethargy noted for a clinical assessment of swallow function this PM. Per charting, pt is a 78 y/o male with PMHx significant for dementia who is currently admitted to [facility] for seizures. CXR completed on 12/23/2017 revealed "Cardiac size is not accurately evaluated in this projection.  Lungs are clear.  No pleural effusions or pneumothorax. No acute osseous abnormality"

## 2017-12-26 NOTE — PROGRESS NOTE ADULT - SUBJECTIVE AND OBJECTIVE BOX
Discussed with neurology, Dr Fernandez;  >>  as per discussion, ASA 81 mg and Lipitor 80 mg daily ordered, (possible subacute CVA on MRI/brain)

## 2017-12-26 NOTE — PROGRESS NOTE ADULT - PROBLEM SELECTOR PLAN 1
- No seizures observed last 24 hours. Last EEG showed no seizures. Neuro f/u appreciated  - MRI brain (12/26) abnormal signal involves the right cingulate gyrus-medial frontal parietal cortex as described. Some considerations include evolving   subacute ischemia, postictal cortical edema, or encephalitis.  - c/w current regimen of AEDs (fosphenytoin, keppra, vimpat)  , check Phenytoin level (dilantin level 8.1 today)

## 2017-12-26 NOTE — PROGRESS NOTE ADULT - ATTENDING COMMENTS
77 year old male with Dementia (unknown baseline) presented with seizures.     Patient unable to provide any history at this time.     The patient was apparently seizing while shoveling snow and had EMS called by a concerned neighbor. The patient was found soaking wet and in the ED was found to have 2 seizures and was given 2mg Ativan IV.     In the ED he was initially afebrile, tachycardic, hypertensive to > 200 SBP, leukocytosis 14.62 on presentation. UA neg, CXR clear. CT Head without acute abnormality.     Developed fevers during hospital stay. 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    MRI with Abnormal signal involves the right cingulate gyrus-medial frontal parietal cortex as described. Some considerations include evolving   subacute ischemia, postictal cortical edema, or encephalitis.    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 in the setting of right frontal seizure activity   -Leukocytosis  -Mental status improving    Recommend:  -Continue ceftriaxone/ ampicillin/ vancomycin/ acyclovir  -Monitor vanco trough.  -LP if family agrees  -F/U neurology  -F/U bld cxs.  -IVF with acyclovir  -Trend WBC.  -Continue to monitor temperature curve.      Sky Phoenix MD  Pager [phone]  After 5pm/weekends call [phone]

## 2017-12-26 NOTE — PROGRESS NOTE ADULT - SUBJECTIVE AND OBJECTIVE BOX
Follow Up:  new onset seizures, concern for encephalitis    Interval History/ROS: afebrile overnight    Allergies  No Known Allergies        ANTIMICROBIALS:  acyclovir IVPB    acyclovir IVPB 700 every 12 hours  ampicillin  IVPB 2 every 6 hours  cefTRIAXone   IVPB 2 every 12 hours  cefTRIAXone   IVPB    vancomycin  IVPB    vancomycin  IVPB 1250 every 24 hours      OTHER MEDS:  MEDICATIONS  (STANDING):  acetaminophen  Suppository 650 every 6 hours PRN  fosphenytoin IVPB 100 every 8 hours  heparin  Injectable 5000 every 12 hours  lacosamide IVPB    lacosamide IVPB 200 every 12 hours  levETIRAcetam  IVPB 1500 every 12 hours      Vital Signs Last 24 Hrs  T(C): 36.7 (26 Dec 2017 05:32), Max: 36.8 (25 Dec 2017 11:15)  T(F): 98 (26 Dec 2017 05:32), Max: 98.2 (25 Dec 2017 11:15)  HR: 66 (26 Dec 2017 05:32) (60 - 66)  BP: 160/84 (26 Dec 2017 05:32) (151/68 - 160/84)  BP(mean): --  RR: 20 (26 Dec 2017 05:32) (10 - 20)  SpO2: 96% (26 Dec 2017 05:32) (94% - 98%)    PHYSICAL EXAM:  General: non-toxic, awake, oriented x 2 but not to time  HEAD/EYES: anicteric, PERRL  ENT:  supple  Cardiovascular:   S1, S2   Respiratory:  clear bilaterally  GI:  soft, non-tender, normal bowel sounds  :  no CVA tenderness   Musculoskeletal:  no synovitis  Neurologic:  RTe UE 4+, LT UE 2/5, unable to move LE, complains of pain on passive flexion, no rigidity  Skin:  healed linear scab below right knee  Lymph: no lymphadenopathy  Psychiatric:  appropriate affect  Vascular:  no phlebitis                                14.7   13.49 )-----------( 429      ( 26 Dec 2017 05:45 )             42.8       12-26    131<L>  |  93<L>  |  15  ----------------------------<  145<H>  4.1   |  22  |  0.86    Ca    8.5      26 Dec 2017 05:45            MICROBIOLOGY:  v  URINE MIDSTREAM  12-22-17 --  --  --      BLOOD PERIPHERAL  12-22-17 --  --  --                RADIOLOGY:  < from: CT Head No Cont (12.24.17 @ 18:59) >    IMPRESSION:   No intracranial hemorrhage, mass effect, or midline shift.   Mild ventriculomegaly superimposed on cerebral volume loss, unchanged.    < end of copied text >    Clinical Impression:  Findings indicate the risk of focal seizures from the right hemisphere, with greatest prominence in the right fronto-central region. There is also evidence of a moderate diffuse encephalopathy with a greater degree of dysfunction in the right hemisphere. No clear seizures were recorded. This study is improved in comparison to the day prior.

## 2017-12-26 NOTE — SWALLOW BEDSIDE ASSESSMENT ADULT - PHARYNGEAL PHASE
Absent laryngeal elevation/Absent trigger of swallow/Delayed cough post oral intake/Delayed throat clear post oral intake/Cough post oral intake/Throat clear post oral intake

## 2017-12-26 NOTE — PROGRESS NOTE ADULT - SUBJECTIVE AND OBJECTIVE BOX
CHIEF COMPLAINT: Patient is a 77y old  male who presents with a chief complaint of Seizure x 1 day (16 Dec 2017 01:26)      SUBJECTIVE / OVERNIGHT EVENTS:  pt feels ok, no specific complaints, denies chest pain/sob/headache    MEDICATIONS  (STANDING):  acyclovir IVPB      acyclovir IVPB 700 milliGRAM(s) IV Intermittent every 12 hours  ampicillin  IVPB 2 Gram(s) IV Intermittent every 6 hours  cefTRIAXone   IVPB 2 Gram(s) IV Intermittent every 12 hours  cefTRIAXone   IVPB      dextrose 5% + sodium chloride 0.9%. 1000 milliLiter(s) (50 mL/Hr) IV Continuous <Continuous>  fosphenytoin IVPB 100 milliGRAM(s) PE IV Intermittent every 8 hours  heparin  Injectable 5000 Unit(s) SubCutaneous every 12 hours  lacosamide IVPB      lacosamide IVPB 200 milliGRAM(s) IV Intermittent every 12 hours  levETIRAcetam  IVPB 1500 milliGRAM(s) IV Intermittent every 12 hours  multivitamin 1 Tablet(s) Oral daily  sodium chloride 0.9% lock flush 3 milliLiter(s) IV Push every 8 hours  vancomycin  IVPB      vancomycin  IVPB 1250 milliGRAM(s) IV Intermittent every 24 hours    MEDICATIONS  (PRN):  acetaminophen  Suppository 650 milliGRAM(s) Rectal every 6 hours PRN For Temp greater than 38 C (100.4 F)  sodium chloride 0.65% Nasal 1 Spray(s) Both Nostrils four times a day PRN Congestion      VITALS:  T(F): 98 (12-26-17 @ 05:32), Max: 98 (12-26-17 @ 05:32)  HR: 66 (12-26-17 @ 05:32) (62 - 66)  BP: 160/84 (12-26-17 @ 05:32) (152/78 - 160/84)  RR: 20 (12-26-17 @ 05:32) (10 - 20)  SpO2: 96% (12-26-17 @ 05:32)      CAPILLARY BLOOD GLUCOSE    Output     I&O's Summary  T(F): 98 (12-26-17 @ 05:32), Max: 98 (12-26-17 @ 05:32)  HR: 66 (12-26-17 @ 05:32) (62 - 66)  BP: 160/84 (12-26-17 @ 05:32) (152/78 - 160/84)  RR: 20 (12-26-17 @ 05:32) (10 - 20)  SpO2: 96% (12-26-17 @ 05:32)    PHYSICAL EXAM:  GENERAL: NAD, well-developed  HEAD:  Atraumatic, Normocephalic  EYES: EOMI, PERRLA, conjunctiva and sclera clear  NECK: Supple, No JVD  CHEST/LUNG: Clear to auscultation bilaterally; No wheeze  HEART: Regular rate and rhythm; No murmurs, rubs, or gallops  ABDOMEN: Soft, Nontender, Nondistended; Bowel sounds present  EXTREMITIES:  2+ Peripheral Pulses, No clubbing, cyanosis, or edema  PSYCH: AAOx1  NEUROLOGY: More awake, follows commands, L hemiparesis  SKIN: No rashes or lesions    LABS:              14.7                 131  | 22   | 15           13.49 >-----------< 429     ------------------------< 145                   42.8                 4.1  | 93   | 0.86                                         Ca 8.5   Mg x     Ph x            ABG - ( 24 Dec 2017 14:14 )  pH: 7.52  /  pCO2: 28    /  pO2: 131   / HCO3: 26    / Base Excess: 0.3   /  SaO2: 99.5        MICROBIOLOGY:    RADIOLOGY & ADDITIONAL TESTS:    Imaging Personally Reviewed:  < from: MR Head No Cont (12.26.17 @ 11:05) >  IMPRESSION:    Abnormal signal involves the right cingulate gyrus-medial frontal  parietal cortex as described. Some considerations include evolving   subacute ischemia, postictal cortical edema, or encephalitis.    A 1.8 cm x 1 cm nonspecific right parietal scalp lesion is present at the   vertex. Although this could reflect a sebaceous cyst, correlate with   direct visualization findings to exclude other etiologies for scalp   lesions.      [ ] Consultant(s) Notes Reviewed:  [ ] Care Discussed with Consultants/Other Providers:

## 2017-12-26 NOTE — PROGRESS NOTE ADULT - ASSESSMENT
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 He is afebrile with improving mental status    c/w ceftriaxone/ ampicillin/ vancomycin/ acyclovir. Check vanco trough before next dose  AED per neuro  Awaiting MRI brain  LP for CSF analysis would be helpful- if family agrees

## 2017-12-26 NOTE — SWALLOW BEDSIDE ASSESSMENT ADULT - ORAL PHASE
Delayed oral transit time/Stasis in lateral sulci/Lingual stasis/Decreased anterior-posterior movement of the bolus/Stasis in anterior sulcus/Palatal stasis

## 2017-12-26 NOTE — PROGRESS NOTE ADULT - PROBLEM SELECTOR PLAN 2
- Pt afebrile, WBC downtrending. ID recs appreciated.  - So far all cultures have been negative  - Concern for underlying viral illness vs HSV encephalitis  -consider LP to r/o meningitis vs. viral encephalitis, f/u Chelita (step-daughter)  -LP if family agreeable  - c/w Vancomycin (monitor troughs), Ceftriaxone, Acyclovir, Ampicillin  - serologies negative, cryptococcal antigen and HIV negative

## 2017-12-27 LAB
BACTERIA BLD CULT: SIGNIFICANT CHANGE UP
BACTERIA BLD CULT: SIGNIFICANT CHANGE UP
BUN SERPL-MCNC: 13 MG/DL — SIGNIFICANT CHANGE UP (ref 7–23)
CALCIUM SERPL-MCNC: 8.8 MG/DL — SIGNIFICANT CHANGE UP (ref 8.4–10.5)
CHLORIDE SERPL-SCNC: 104 MMOL/L — SIGNIFICANT CHANGE UP (ref 98–107)
CO2 SERPL-SCNC: 23 MMOL/L — SIGNIFICANT CHANGE UP (ref 22–31)
CREAT SERPL-MCNC: 0.88 MG/DL — SIGNIFICANT CHANGE UP (ref 0.5–1.3)
GLUCOSE SERPL-MCNC: 115 MG/DL — HIGH (ref 70–99)
HCT VFR BLD CALC: 39 % — SIGNIFICANT CHANGE UP (ref 39–50)
HGB BLD-MCNC: 12.8 G/DL — LOW (ref 13–17)
MAGNESIUM SERPL-MCNC: 2.1 MG/DL — SIGNIFICANT CHANGE UP (ref 1.6–2.6)
MCHC RBC-ENTMCNC: 28.4 PG — SIGNIFICANT CHANGE UP (ref 27–34)
MCHC RBC-ENTMCNC: 32.8 % — SIGNIFICANT CHANGE UP (ref 32–36)
MCV RBC AUTO: 86.5 FL — SIGNIFICANT CHANGE UP (ref 80–100)
NRBC # FLD: 0 — SIGNIFICANT CHANGE UP
PHOSPHATE SERPL-MCNC: 2.8 MG/DL — SIGNIFICANT CHANGE UP (ref 2.5–4.5)
PLATELET # BLD AUTO: 469 K/UL — HIGH (ref 150–400)
PMV BLD: 8.7 FL — SIGNIFICANT CHANGE UP (ref 7–13)
POTASSIUM SERPL-MCNC: 3.6 MMOL/L — SIGNIFICANT CHANGE UP (ref 3.5–5.3)
POTASSIUM SERPL-SCNC: 3.6 MMOL/L — SIGNIFICANT CHANGE UP (ref 3.5–5.3)
RBC # BLD: 4.51 M/UL — SIGNIFICANT CHANGE UP (ref 4.2–5.8)
RBC # FLD: 14 % — SIGNIFICANT CHANGE UP (ref 10.3–14.5)
SODIUM SERPL-SCNC: 139 MMOL/L — SIGNIFICANT CHANGE UP (ref 135–145)
WBC # BLD: 15.94 K/UL — HIGH (ref 3.8–10.5)
WBC # FLD AUTO: 15.94 K/UL — HIGH (ref 3.8–10.5)
WNV IGG TITR FLD: NEGATIVE — SIGNIFICANT CHANGE UP

## 2017-12-27 PROCEDURE — 99232 SBSQ HOSP IP/OBS MODERATE 35: CPT

## 2017-12-27 PROCEDURE — 95819 EEG AWAKE AND ASLEEP: CPT | Mod: 26

## 2017-12-27 PROCEDURE — 99233 SBSQ HOSP IP/OBS HIGH 50: CPT

## 2017-12-27 RX ORDER — FOSPHENYTOIN 50 MG/ML
350 INJECTION INTRAMUSCULAR; INTRAVENOUS ONCE
Qty: 0 | Refills: 0 | Status: DISCONTINUED | OUTPATIENT
Start: 2017-12-27 | End: 2017-12-27

## 2017-12-27 RX ORDER — FOSPHENYTOIN 50 MG/ML
125 INJECTION INTRAMUSCULAR; INTRAVENOUS EVERY 8 HOURS
Qty: 0 | Refills: 0 | Status: DISCONTINUED | OUTPATIENT
Start: 2017-12-27 | End: 2018-01-09

## 2017-12-27 RX ORDER — FOSPHENYTOIN 50 MG/ML
300 INJECTION INTRAMUSCULAR; INTRAVENOUS ONCE
Qty: 0 | Refills: 0 | Status: COMPLETED | OUTPATIENT
Start: 2017-12-27 | End: 2017-12-27

## 2017-12-27 RX ORDER — LIDOCAINE HCL 20 MG/ML
20 VIAL (ML) INJECTION ONCE
Qty: 0 | Refills: 0 | Status: DISCONTINUED | OUTPATIENT
Start: 2017-12-27 | End: 2018-01-07

## 2017-12-27 RX ADMIN — Medication 1 TABLET(S): at 12:45

## 2017-12-27 RX ADMIN — SODIUM CHLORIDE 3 MILLILITER(S): 9 INJECTION INTRAMUSCULAR; INTRAVENOUS; SUBCUTANEOUS at 14:56

## 2017-12-27 RX ADMIN — SODIUM CHLORIDE 3 MILLILITER(S): 9 INJECTION INTRAMUSCULAR; INTRAVENOUS; SUBCUTANEOUS at 21:57

## 2017-12-27 RX ADMIN — FOSPHENYTOIN 104 MILLIGRAM(S) PE: 50 INJECTION INTRAMUSCULAR; INTRAVENOUS at 05:27

## 2017-12-27 RX ADMIN — ATORVASTATIN CALCIUM 80 MILLIGRAM(S): 80 TABLET, FILM COATED ORAL at 21:57

## 2017-12-27 RX ADMIN — Medication 216 GRAM(S): at 02:57

## 2017-12-27 RX ADMIN — LACOSAMIDE 140 MILLIGRAM(S): 50 TABLET ORAL at 08:12

## 2017-12-27 RX ADMIN — Medication 264 MILLIGRAM(S): at 18:01

## 2017-12-27 RX ADMIN — Medication 216 GRAM(S): at 08:12

## 2017-12-27 RX ADMIN — SODIUM CHLORIDE 3 MILLILITER(S): 9 INJECTION INTRAMUSCULAR; INTRAVENOUS; SUBCUTANEOUS at 06:06

## 2017-12-27 RX ADMIN — FOSPHENYTOIN 112 MILLIGRAM(S) PE: 50 INJECTION INTRAMUSCULAR; INTRAVENOUS at 11:16

## 2017-12-27 RX ADMIN — HEPARIN SODIUM 5000 UNIT(S): 5000 INJECTION INTRAVENOUS; SUBCUTANEOUS at 05:28

## 2017-12-27 RX ADMIN — LACOSAMIDE 140 MILLIGRAM(S): 50 TABLET ORAL at 21:57

## 2017-12-27 RX ADMIN — LEVETIRACETAM 400 MILLIGRAM(S): 250 TABLET, FILM COATED ORAL at 09:35

## 2017-12-27 RX ADMIN — CEFTRIAXONE 100 GRAM(S): 500 INJECTION, POWDER, FOR SOLUTION INTRAMUSCULAR; INTRAVENOUS at 18:02

## 2017-12-27 RX ADMIN — FOSPHENYTOIN 105 MILLIGRAM(S) PE: 50 INJECTION INTRAMUSCULAR; INTRAVENOUS at 21:58

## 2017-12-27 RX ADMIN — FOSPHENYTOIN 105 MILLIGRAM(S) PE: 50 INJECTION INTRAMUSCULAR; INTRAVENOUS at 14:54

## 2017-12-27 RX ADMIN — Medication 264 MILLIGRAM(S): at 06:29

## 2017-12-27 RX ADMIN — CEFTRIAXONE 100 GRAM(S): 500 INJECTION, POWDER, FOR SOLUTION INTRAMUSCULAR; INTRAVENOUS at 05:27

## 2017-12-27 RX ADMIN — LEVETIRACETAM 400 MILLIGRAM(S): 250 TABLET, FILM COATED ORAL at 20:14

## 2017-12-27 NOTE — CHART NOTE - NSCHARTNOTEFT_GEN_A_CORE
Source: Patient [X]    Family [ ]     other [ ]; Review of the patient's medical chart, RN    Current Diet : Mechanical soft with thin fluids, Pt. was previously NPO x 8 days.   Reported:  [ ] nausea  [ ] vomiting [ ] diarrhea [ ] constipation  [ ]chewing problems [ ] swallowing issues  [ ] other:   PO intake:  < 50% [X] 50-75% [ ]   % [ ]  other :  Current Weight: 141.9 lbs (64.4kgs) [12-27], suggest 8 lbs ( 5.6%) Wt loss since admission. Admission Wt. 149.9 pounds (12/16)  Diet recently advanced to  mechanical soft, Pt previously NPO since 12-19.  No GI distress (nausea/vomiting/diarrhea/constipation.) s/p swallow eval 12/16, but patient was not able to participate and he is pending repeat swallow evaluation.      __________________ Pertinent Medications__________________   MEDICATIONS  (STANDING):  acyclovir IVPB      acyclovir IVPB 700 milliGRAM(s) IV Intermittent every 12 hours  atorvastatin 80 milliGRAM(s) Oral at bedtime  cefTRIAXone   IVPB 2 Gram(s) IV Intermittent every 12 hours  cefTRIAXone   IVPB      dextrose 5% + sodium chloride 0.9%. 1000 milliLiter(s) (50 mL/Hr) IV Continuous <Continuous>  fosphenytoin IVPB 125 milliGRAM(s) PE IV Intermittent every 8 hours  lacosamide IVPB      lacosamide IVPB 200 milliGRAM(s) IV Intermittent every 12 hours  levETIRAcetam  IVPB 1500 milliGRAM(s) IV Intermittent every 12 hours  lidocaine 1% Injectable 20 milliLiter(s) Local Injection once  multivitamin 1 Tablet(s) Oral daily  sodium chloride 0.9% lock flush 3 milliLiter(s) IV Push every 8 hours    MEDICATIONS  (PRN):  acetaminophen  Suppository 650 milliGRAM(s) Rectal every 6 hours PRN For Temp greater than 38 C (100.4 F)  hydrALAZINE Injectable 5 milliGRAM(s) IV Push every 6 hours PRN SBP>150  sodium chloride 0.65% Nasal 1 Spray(s) Both Nostrils four times a day PRN Congestion      __________________ Pertinent Labs__________________   12-27 Na139 mmol/L Glu 115 mg/dL<H> K+ 3.6 mmol/L Cr  0.88 mg/dL BUN 13 mg/dL Phos 2.8 mg/dL Alb n/a   PAB n/a         New Nutrition Diagnosis: [ ] not applicable    [ ] Inadequate Protein Energy Intake   [ ]Inadequate Oral Intake   [ ] Excessive Energy Intake   [ ] Underweight   [ ] Increased Nutrient Needs   [ ] Overweight/Obesity   [ ] Altered GI Function   [ ] Unintended Weight Loss   [ ] Food & Nutrition Related Knowledge Deficit  [ ] Limited Adherence to nutrition related recommendations   [X] Malnutrition    [ ] other:        Related to: Pt meets criteria for severe malnutrition in the context of acute illness  As evidenced by: wt loss x  8 pounds ( 5.6%) < 1 mo and NPo status     Nutrition Recommendations:  1- Continue current diet order, which remains appropriate at this time. f/u speech and swallow recommendations  2- Monitor weights, labs, BM's, skin integrity, p.o. intake.   3- Please Encourage po intake, assist with meals and menu selections, provide alternatives PRN.   4 consider alternate means of nutrition if Pt is not able to tolerate current diet. reconsult RD for recommendations.

## 2017-12-27 NOTE — CHART NOTE - NSCHARTNOTEFT_GEN_A_CORE
NUTRITION SERVICES                                                                                  MALNUTRITION ALERT     Attention Health Care Provider: Upon nutritional assessment by the Registered Dietitian your patient was determined to meet criteria / has evidence of the following diagnosis/diagnoses:    [ ] Mild Protein Calorie Malnutrition   [ ] Moderate Protein Calorie Malnutrition   [X ] Severe Protein Calorie Malnutrition   [ ] Unspecified Protein Calorie Malnutrition   [ ] Underweight / BMI <19  [ ] Morbid Obesity / BMI >40    By signing this assessment you are acknowledging the diagnosis/diagnoses.       PLAN OF CARE: Refer to Initial Dietitian Evaluation or Nutrition Follow-Up Documentation for Nutritional Recommendations.

## 2017-12-27 NOTE — PROGRESS NOTE ADULT - ASSESSMENT
77 year old man with unknown PMH BIBEMS found outside shoveling snow soaking wet by neighbors, brought to ED, where he had 2 witnessed seizures, treated w/ Ativan and started on keppra, dose increased to 750 mg BID given 2 noted breakthrough seizures on 12/18. 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 CT head showed no intracranial hemorrhage, mass effect, or midline shift. Mild ventriculomegaly superimposed on cerebral volume loss, unchanged.   MRI brain showed abnormal signal involves the right cingulate gyrus-medial frontal parietal cortex. A 1.8 cm x 1 cm nonspecific right parietal scalp lesion is present at the vertex.  On neuro exam today, patient more awake and alert, moving LUE more.     Left sided hemiparesis resolving but still having muscle twitching in LLE    Recommendations:  -c/w keppra 1500 mg BID  -increase phosphenytoin to 125 mg TID as dilantin level was low (8.1)  -c/w vimpat 200 mg BID  -c/w infectious w/u  -LP for herpes PCR 77 year old man with unknown PMH BIBEMS found outside shoveling snow soaking wet by neighbors, brought to ED, where he had 2 witnessed seizures, treated w/ Ativan and started on keppra, dose increased to 750 mg BID given 2 noted breakthrough seizures on 12/18. 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 CT head showed no intracranial hemorrhage, mass effect, or midline shift. Mild ventriculomegaly superimposed on cerebral volume loss, unchanged.   MRI brain showed abnormal signal involves the right cingulate gyrus-medial frontal parietal cortex. A 1.8 cm x 1 cm nonspecific right parietal scalp lesion is present at the vertex.  On neuro exam today, patient more awake and alert, moving LUE more.     Left sided hemiparesis resolving and LLE clonic movements much improved.     Recommendations:  -c/w keppra 1500 mg BID  -increase phosphenytoin to 125 mg TID as dilantin level was low (8.1)  -c/w vimpat 200 mg BID  -c/w infectious w/u  -LP for herpes PCR

## 2017-12-27 NOTE — EEG REPORT - NS EEG TEXT BOX
[facility]   COMPREHENSIVE EPILEPSY CENTER   REPORT OF ROUTINE VIDEO EEG     [facility]: [address], [place] [address], Ph#: [phone]  [address]: [id number] [address], Ph#: [phone]  Office: [address], [place] [address] Ph#: [phone]    Patient Name: DELGADO SUE  Age and : 77y (1940)  MRN #: [id number]  Location: [facility] 4N 405 A    Study Date: 17    --------------------------------------------------------------------------------------------------  TECHNICAL INFORMATION    EEG Placement and Labeling of Electrodes:  The EEG was performed utilizing 20 channels referential EEG connections (coronal over temporal over parasagittal montage) using all standard 10-20 electrode placements with EKG.  Recording was at a sampling rate of 256 samples per second per channel.  Time synchronized digital video recording was done simultaneously with EEG recording.  A low light infrared camera was used for low light recording.  Nils and seizure detection algorithms were utilized.    --------------------------------------------------------------------------------------------------  History:  Patient is a 77y old  Male who presents with a chief complaint of Seizure x 1 day (16 Dec 2017 01:26).    Pertinent Medication:  fosphenytoin IVPB 125 milliGRAM(s) PE IV Intermittent every 8 hours  lacosamide IVPB 200 milliGRAM(s) IV Intermittent every 12 hours  levETIRAcetam  IVPB 1500 milliGRAM(s) IV Intermittent every 12 hours    --------------------------------------------------------------------------------------------------  STUDY INTERPRETATION:    Awake Background:  The background was continuous, spontaneously variable and reactive.  During wakefulness, the posteriorly dominant rhythm consisted of symmetric, well-modulated 7 Hz activity, with an amplitude to 30 uV, that attenuated to eye opening.  Low amplitude frontal beta was noted in wakefulness. The remainder of the awake background consisted predominantly of alpha frequency activity diffusely.    Sleep Background:  Drowsiness was characterized by fragmentation, attenuation, and slowing of the background activity.    Sleep was characterized by the presence of vertex waves.    Non-epileptiform Abnormalities  None    Interictal Epileptiform Abnormalities:   None    Ictal Epileptiform Abnormalities:   No clinical events.  No electrographic seizures.    Artifacts:  Intermittent myogenic and movement artifacts were noted.    ECG:  The heart rate on single channel ECG was predominantly between 60-70 BPM.    EEG Classification / Summary:  Mildly abnormal EEG in the awake/drowsy states due to  - mild generalized slowing    Clinical Impression:  Mildly abnormal EEG study in the awake and drowsy states due to mild diffuse or multifocal cerebral dysfunction.  No epileptic pattern or seizure seen.    ________________________________________    Miguel Eckert MD  Attending Physician, [facility]

## 2017-12-27 NOTE — PROGRESS NOTE ADULT - ATTENDING COMMENTS
77 year old male with Dementia (unknown baseline) presented with seizures.     Patient unable to provide any history at this time.     The patient was apparently seizing while shoveling snow and had EMS called by a concerned neighbor. The patient was found soaking wet and in the ED was found to have 2 seizures and was given 2mg Ativan IV.     In the ED he was initially afebrile, tachycardic, hypertensive to > 200 SBP, leukocytosis 14.62 on presentation. UA neg, CXR clear. CT Head without acute abnormality.     Developed fevers during hospital stay. 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    MRI with Abnormal signal involves the right cingulate gyrus-medial frontal parietal cortex as described. Some considerations include evolving   subacute ischemia, postictal cortical edema, or encephalitis.    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 in the setting of right frontal seizure activity   -Leukocytosis  -Mental status improving    Recommend:  -Continue ceftriaxone/ acyclovir.  -Discontinue vanco/ ampicillin  -LP if family agrees  -F/U neurology  -F/U bld cxs.  -IVF with acyclovir  -Trend WBC.  -Continue to monitor temperature curve.    Sky Phoenix MD  Pager [phone]  After 5pm/weekends call [phone]

## 2017-12-27 NOTE — PROGRESS NOTE ADULT - SUBJECTIVE AND OBJECTIVE BOX
CHIEF COMPLAINT: Patient is a 77y old  male who presents with a chief complaint of Seizure x 1 day (16 Dec 2017 01:26)      SUBJECTIVE / OVERNIGHT EVENTS:  pt feels ok, knows that he is in the hospital and able to answer simple questions    MEDICATIONS  (STANDING):  acyclovir IVPB      acyclovir IVPB 700 milliGRAM(s) IV Intermittent every 12 hours  ampicillin  IVPB 2 Gram(s) IV Intermittent every 6 hours  atorvastatin 80 milliGRAM(s) Oral at bedtime  cefTRIAXone   IVPB 2 Gram(s) IV Intermittent every 12 hours  cefTRIAXone   IVPB      dextrose 5% + sodium chloride 0.9%. 1000 milliLiter(s) (50 mL/Hr) IV Continuous <Continuous>  fosphenytoin IVPB 125 milliGRAM(s) PE IV Intermittent every 8 hours  lacosamide IVPB      lacosamide IVPB 200 milliGRAM(s) IV Intermittent every 12 hours  levETIRAcetam  IVPB 1500 milliGRAM(s) IV Intermittent every 12 hours  multivitamin 1 Tablet(s) Oral daily  sodium chloride 0.9% lock flush 3 milliLiter(s) IV Push every 8 hours  vancomycin  IVPB      vancomycin  IVPB 1250 milliGRAM(s) IV Intermittent every 24 hours    MEDICATIONS  (PRN):  acetaminophen  Suppository 650 milliGRAM(s) Rectal every 6 hours PRN For Temp greater than 38 C (100.4 F)  hydrALAZINE Injectable 5 milliGRAM(s) IV Push every 6 hours PRN SBP>150  sodium chloride 0.65% Nasal 1 Spray(s) Both Nostrils four times a day PRN Congestion      VITALS:  T(F): 99.3 (12-27-17 @ 04:56), Max: 99.3 (12-27-17 @ 04:56)  HR: 63 (12-27-17 @ 04:56) (60 - 71)  BP: 140/76 (12-27-17 @ 04:56) (131/74 - 151/82)  RR: 19 (12-27-17 @ 04:56) (19 - 19)  SpO2: 97% (12-27-17 @ 04:56)      CAPILLARY BLOOD GLUCOSE    Output     I&O's Summary  T(F): 99.3 (12-27-17 @ 04:56), Max: 99.3 (12-27-17 @ 04:56)  HR: 63 (12-27-17 @ 04:56) (60 - 71)  BP: 140/76 (12-27-17 @ 04:56) (131/74 - 151/82)  RR: 19 (12-27-17 @ 04:56) (19 - 19)  SpO2: 97% (12-27-17 @ 04:56)    PHYSICAL EXAM:  GENERAL: NAD, well-developed  HEAD:  Atraumatic, Normocephalic  EYES: EOMI, PERRLA, conjunctiva and sclera clear  NECK: Supple, No JVD  CHEST/LUNG: Clear to auscultation bilaterally; No wheeze  HEART: Regular rate and rhythm; No murmurs, rubs, or gallops  ABDOMEN: Soft, Nontender, Nondistended; Bowel sounds present  EXTREMITIES:  2+ Peripheral Pulses, No clubbing, cyanosis, or edema  PSYCH: AAOx1  NEUROLOGY: More awake, follows commands, Left hemiparesis  SKIN: No rashes or lesions    LABS:              12.8                 139  | 23   | 13           15.94 >-----------< 469     ------------------------< 115                   39.0                 3.6  | 104  | 0.88                                         Ca 8.8   Mg 2.1   Ph 2.8        MICROBIOLOGY:    RADIOLOGY & ADDITIONAL TESTS:    Imaging Personally Reviewed:  < from: MR Head No Cont (12.26.17 @ 11:05) >  ncreased DWI signal involves the right cingulate gyrus-medial frontal   parietal cortical location, without discrete increased or decreased ADC   map signal. Mild increased T2 and FLAIR signal is present in this   location without significant mass effect. There is no evidence for acute   hemorrhage. No focal signal abnormalities are noted involving the medial   temporal lobes or insular cortex.    Nonspecific dilatation of ventricular system is stable compared to the   prior CT. Considerations include central greater than cortical atrophy or   normal pressure hydrocephalus in the appropriate clinical setting.    Mild patchy increased T2 and FLAIR signal is present throughout the   periventricular and subcortical white matter of the cerebral hemispheres   without mass effect which most likely represents chronic microvascular   ischemic changes given the patient's age. Cerebral volume loss is noted   with secondary prominence of the sulci and ventricles.    A 1.8 cm x 1 cm nonspecific right parietal scalp lesion is present at the   vertex. Although this could reflect a sebaceous cyst, correlate with   direct visualization findings to exclude other etiologies for scalp   lesions.    I discussed the exam findings and differential diagnosis with the   covering clinician Lyndsay at 11:30 AM on 12/26/2017 with read back.    IMPRESSION:    Abnormal signal involves the right cingulate gyrus-medial frontal  parietal cortex as described. Some considerations include evolving   subacute ischemia, postictal cortical edema, or encephalitis.    A 1.8 cm x 1 cm nonspecific right parietal scalp lesion is present at the   vertex. Although this could reflect a sebaceous cyst, correlate with   direct visualization findings to exclude other etiologies for scalp   lesions.      [ ] Consultant(s) Notes Reviewed:  [ ] Care Discussed with Consultants/Other Providers:

## 2017-12-27 NOTE — PROGRESS NOTE ADULT - SUBJECTIVE AND OBJECTIVE BOX
CC: F/U seizure, meningitis    Inverval History/ROS: Patient's mental status improving, now aaox2, has no complaints. Denies fever, chills.    Allergies  No Known Allergies      ANTIMICROBIALS:  acyclovir IVPB    acyclovir IVPB 700 every 12 hours  ampicillin  IVPB 2 every 6 hours  cefTRIAXone   IVPB 2 every 12 hours  cefTRIAXone   IVPB    vancomycin  IVPB    vancomycin  IVPB 1250 every 24 hours      OTHER MEDS:  acetaminophen  Suppository 650 milliGRAM(s) Rectal every 6 hours PRN  atorvastatin 80 milliGRAM(s) Oral at bedtime  dextrose 5% + sodium chloride 0.9%. 1000 milliLiter(s) IV Continuous <Continuous>  fosphenytoin IVPB 125 milliGRAM(s) PE IV Intermittent every 8 hours  fosphenytoin IVPB 300 milliGRAM(s) PE IV Intermittent once  heparin  Injectable 5000 Unit(s) SubCutaneous every 12 hours  hydrALAZINE Injectable 5 milliGRAM(s) IV Push every 6 hours PRN  lacosamide IVPB      lacosamide IVPB 200 milliGRAM(s) IV Intermittent every 12 hours  levETIRAcetam  IVPB 1500 milliGRAM(s) IV Intermittent every 12 hours  multivitamin 1 Tablet(s) Oral daily  sodium chloride 0.65% Nasal 1 Spray(s) Both Nostrils four times a day PRN  sodium chloride 0.9% lock flush 3 milliLiter(s) IV Push every 8 hours      PE:    Vital Signs Last 24 Hrs  T(C): 37.4 (27 Dec 2017 04:56), Max: 37.4 (27 Dec 2017 04:56)  T(F): 99.3 (27 Dec 2017 04:56), Max: 99.3 (27 Dec 2017 04:56)  HR: 63 (27 Dec 2017 04:56) (60 - 71)  BP: 140/76 (27 Dec 2017 04:56) (131/74 - 151/82)  BP(mean): --  RR: 19 (27 Dec 2017 04:56) (19 - 19)  SpO2: 97% (27 Dec 2017 04:56) (96% - 98%)    Gen: NAD, non-toxic  CV: S1+S2 normal, no murmurs  Resp: Clear bilat, no resp distress  Abd: Soft, nontender, +BS  Ext: No LE edema, no wounds  : No Tena  IV/Skin: No thrombophlebitis  Neuro: AAOx2    LABS:                          12.8   15.94 )-----------( 469      ( 27 Dec 2017 06:20 )             39.0       12-27    139  |  104  |  13  ----------------------------<  115<H>  3.6   |  23  |  0.88    Ca    8.8      27 Dec 2017 06:20  Phos  2.8     12-27  Mg     2.1     12-27    MICROBIOLOGY:  Vancomycin Level, Trough: 13.9 ug/mL (12-26-17 @ 11:47)  v  URINE MIDSTREAM  12-22-17 --  --  --      BLOOD PERIPHERAL  12-22-17 --  --  --    RADIOLOGY:    < from: MR Head No Cont (12.26.17 @ 11:05) >  IMPRESSION:    Abnormal signal involves the right cingulate gyrus-medial frontal  parietal cortex as described. Some considerations include evolving   subacute ischemia, postictal cortical edema, or encephalitis.    A 1.8 cm x 1 cm nonspecific right parietal scalp lesion is present at the   vertex. Although this could reflect a sebaceous cyst, correlate with   direct visualization findings to exclude other etiologies for scalp   lesions.          < end of copied text >

## 2017-12-27 NOTE — PROGRESS NOTE ADULT - ATTENDING COMMENTS
Pt is clinically improving on broad spectrum ABx and acyclovir. MRI findings not typical for HSV encephalitis and are more likely related to prolonged focal status epilepticus.  Awaiting LP to determine if acyclovir is necessary.  Will adjust dose of DPH and repeat routine EEG.

## 2017-12-27 NOTE — PROGRESS NOTE ADULT - SUBJECTIVE AND OBJECTIVE BOX
Subjective:Interval History - No events overnight  Patient states he's feeling better    Objective:   Vital Signs Last 24 Hrs  T(C): 37.4 (27 Dec 2017 04:56), Max: 37.4 (27 Dec 2017 04:56)  T(F): 99.3 (27 Dec 2017 04:56), Max: 99.3 (27 Dec 2017 04:56)  HR: 63 (27 Dec 2017 04:56) (60 - 71)  BP: 140/76 (27 Dec 2017 04:56) (131/74 - 151/82)  RR: 19 (27 Dec 2017 04:56) (19 - 19)  SpO2: 97% (27 Dec 2017 04:56) (96% - 98%)    Mental Status: Orientated to self and hospital. No dysarthria, aphasia or neglect. Difficulty following simple commands.     Cranial Nerves: PERRL, EOMI, blinks to threat bilaterally, no nystagmus or diplopia.  Decreased right naso-labial fold.     Motor:  Tone: Increased tone LUE.                   Strength: LUE 3/5, moving RUE spontaneously, not moving LE    12-27    139  |  104  |  13  ----------------------------<  115<H>  3.6   |  23  |  0.88    Ca    8.8      27 Dec 2017 06:20  Phos  2.8     12-27  Mg     2.1     12-27                        12.8   15.94 )-----------( 469      ( 27 Dec 2017 06:20 )             39.0     MEDICATIONS  (STANDING):  acyclovir IVPB      acyclovir IVPB 700 milliGRAM(s) IV Intermittent every 12 hours  ampicillin  IVPB 2 Gram(s) IV Intermittent every 6 hours  atorvastatin 80 milliGRAM(s) Oral at bedtime  cefTRIAXone   IVPB 2 Gram(s) IV Intermittent every 12 hours  cefTRIAXone   IVPB      dextrose 5% + sodium chloride 0.9%. 1000 milliLiter(s) (50 mL/Hr) IV Continuous <Continuous>  fosphenytoin IVPB 100 milliGRAM(s) PE IV Intermittent every 8 hours  heparin  Injectable 5000 Unit(s) SubCutaneous every 12 hours  lacosamide IVPB      lacosamide IVPB 200 milliGRAM(s) IV Intermittent every 12 hours  levETIRAcetam  IVPB 1500 milliGRAM(s) IV Intermittent every 12 hours  multivitamin 1 Tablet(s) Oral daily  sodium chloride 0.9% lock flush 3 milliLiter(s) IV Push every 8 hours  vancomycin  IVPB      vancomycin  IVPB 1250 milliGRAM(s) IV Intermittent every 24 hours    MEDICATIONS  (PRN):  acetaminophen  Suppository 650 milliGRAM(s) Rectal every 6 hours PRN For Temp greater than 38 C (100.4 F)  hydrALAZINE Injectable 5 milliGRAM(s) IV Push every 6 hours PRN SBP>150  sodium chloride 0.65% Nasal 1 Spray(s) Both Nostrils four times a day PRN Congestion Subjective:Interval History - No events overnight  Patient states he's feeling better    Objective:   Vital Signs Last 24 Hrs  T(C): 37.4 (27 Dec 2017 04:56), Max: 37.4 (27 Dec 2017 04:56)  T(F): 99.3 (27 Dec 2017 04:56), Max: 99.3 (27 Dec 2017 04:56)  HR: 63 (27 Dec 2017 04:56) (60 - 71)  BP: 140/76 (27 Dec 2017 04:56) (131/74 - 151/82)  RR: 19 (27 Dec 2017 04:56) (19 - 19)  SpO2: 97% (27 Dec 2017 04:56) (96% - 98%)    Mental Status: Orientated to self and hospital. No dysarthria, aphasia or neglect. Difficulty following simple commands.     Cranial Nerves: PERRL, EOMI, blinks to threat bilaterally, no nystagmus or diplopia.  Decreased right naso-labial fold.     Motor:  Tone: Increased tone LUE.                   Strength: LUE 3/5, moving RUE spontaneously, not moving LE, LE's in contracture    12-27    139  |  104  |  13  ----------------------------<  115<H>  3.6   |  23  |  0.88    Ca    8.8      27 Dec 2017 06:20  Phos  2.8     12-27  Mg     2.1     12-27                        12.8   15.94 )-----------( 469      ( 27 Dec 2017 06:20 )             39.0     MEDICATIONS  (STANDING):  acyclovir IVPB      acyclovir IVPB 700 milliGRAM(s) IV Intermittent every 12 hours  ampicillin  IVPB 2 Gram(s) IV Intermittent every 6 hours  atorvastatin 80 milliGRAM(s) Oral at bedtime  cefTRIAXone   IVPB 2 Gram(s) IV Intermittent every 12 hours  cefTRIAXone   IVPB      dextrose 5% + sodium chloride 0.9%. 1000 milliLiter(s) (50 mL/Hr) IV Continuous <Continuous>  fosphenytoin IVPB 100 milliGRAM(s) PE IV Intermittent every 8 hours  heparin  Injectable 5000 Unit(s) SubCutaneous every 12 hours  lacosamide IVPB      lacosamide IVPB 200 milliGRAM(s) IV Intermittent every 12 hours  levETIRAcetam  IVPB 1500 milliGRAM(s) IV Intermittent every 12 hours  multivitamin 1 Tablet(s) Oral daily  sodium chloride 0.9% lock flush 3 milliLiter(s) IV Push every 8 hours  vancomycin  IVPB      vancomycin  IVPB 1250 milliGRAM(s) IV Intermittent every 24 hours    MEDICATIONS  (PRN):  acetaminophen  Suppository 650 milliGRAM(s) Rectal every 6 hours PRN For Temp greater than 38 C (100.4 F)  hydrALAZINE Injectable 5 milliGRAM(s) IV Push every 6 hours PRN SBP>150  sodium chloride 0.65% Nasal 1 Spray(s) Both Nostrils four times a day PRN Congestion

## 2017-12-27 NOTE — PROGRESS NOTE ADULT - PROBLEM SELECTOR PLAN 2
- So far all cultures have been negative  - Concern for underlying viral illness vs HSV encephalitis  -plan for LP tomorrow to r/o meningitis vs. viral (herpes) encephalitis. I spoke to pt's daughter in law Chelita [phone] who's agreeable with it now.  - c/w Vancomycin (monitor troughs), Ceftriaxone, Acyclovir, Ampicillin  - serologies negative, cryptococcal antigen and HIV negative

## 2017-12-28 LAB
APTT BLD: 32.6 SEC — SIGNIFICANT CHANGE UP (ref 27.5–37.4)
BASOPHILS # BLD AUTO: 0.05 K/UL — SIGNIFICANT CHANGE UP (ref 0–0.2)
BASOPHILS NFR BLD AUTO: 0.3 % — SIGNIFICANT CHANGE UP (ref 0–2)
BUN SERPL-MCNC: 13 MG/DL — SIGNIFICANT CHANGE UP (ref 7–23)
CALCIUM SERPL-MCNC: 8.1 MG/DL — LOW (ref 8.4–10.5)
CHLORIDE SERPL-SCNC: 100 MMOL/L — SIGNIFICANT CHANGE UP (ref 98–107)
CO2 SERPL-SCNC: 22 MMOL/L — SIGNIFICANT CHANGE UP (ref 22–31)
CREAT SERPL-MCNC: 0.82 MG/DL — SIGNIFICANT CHANGE UP (ref 0.5–1.3)
EOSINOPHIL # BLD AUTO: 0.27 K/UL — SIGNIFICANT CHANGE UP (ref 0–0.5)
EOSINOPHIL NFR BLD AUTO: 1.9 % — SIGNIFICANT CHANGE UP (ref 0–6)
GLUCOSE SERPL-MCNC: 182 MG/DL — HIGH (ref 70–99)
HCT VFR BLD CALC: 34.9 % — LOW (ref 39–50)
HGB BLD-MCNC: 11.6 G/DL — LOW (ref 13–17)
IMM GRANULOCYTES # BLD AUTO: 0.64 # — SIGNIFICANT CHANGE UP
IMM GRANULOCYTES NFR BLD AUTO: 4.4 % — HIGH (ref 0–1.5)
INR BLD: 1.54 — HIGH (ref 0.88–1.17)
LYMPHOCYTES # BLD AUTO: 1.01 K/UL — SIGNIFICANT CHANGE UP (ref 1–3.3)
LYMPHOCYTES # BLD AUTO: 7 % — LOW (ref 13–44)
MAGNESIUM SERPL-MCNC: 2 MG/DL — SIGNIFICANT CHANGE UP (ref 1.6–2.6)
MCHC RBC-ENTMCNC: 28.8 PG — SIGNIFICANT CHANGE UP (ref 27–34)
MCHC RBC-ENTMCNC: 33.2 % — SIGNIFICANT CHANGE UP (ref 32–36)
MCV RBC AUTO: 86.6 FL — SIGNIFICANT CHANGE UP (ref 80–100)
MONOCYTES # BLD AUTO: 0.71 K/UL — SIGNIFICANT CHANGE UP (ref 0–0.9)
MONOCYTES NFR BLD AUTO: 4.9 % — SIGNIFICANT CHANGE UP (ref 2–14)
NEUTROPHILS # BLD AUTO: 11.74 K/UL — HIGH (ref 1.8–7.4)
NEUTROPHILS NFR BLD AUTO: 81.5 % — HIGH (ref 43–77)
NRBC # FLD: 0 — SIGNIFICANT CHANGE UP
PLATELET # BLD AUTO: 490 K/UL — HIGH (ref 150–400)
PMV BLD: 8.9 FL — SIGNIFICANT CHANGE UP (ref 7–13)
POTASSIUM SERPL-MCNC: 3.3 MMOL/L — LOW (ref 3.5–5.3)
POTASSIUM SERPL-SCNC: 3.3 MMOL/L — LOW (ref 3.5–5.3)
PROTHROM AB SERPL-ACNC: 17.2 SEC — HIGH (ref 9.8–13.1)
RBC # BLD: 4.03 M/UL — LOW (ref 4.2–5.8)
RBC # FLD: 14 % — SIGNIFICANT CHANGE UP (ref 10.3–14.5)
SODIUM SERPL-SCNC: 136 MMOL/L — SIGNIFICANT CHANGE UP (ref 135–145)
WBC # BLD: 14.42 K/UL — HIGH (ref 3.8–10.5)
WBC # FLD AUTO: 14.42 K/UL — HIGH (ref 3.8–10.5)
WNV IGM SPEC QL: NEGATIVE — SIGNIFICANT CHANGE UP

## 2017-12-28 PROCEDURE — 99233 SBSQ HOSP IP/OBS HIGH 50: CPT

## 2017-12-28 RX ORDER — POTASSIUM CHLORIDE 20 MEQ
40 PACKET (EA) ORAL ONCE
Qty: 0 | Refills: 0 | Status: COMPLETED | OUTPATIENT
Start: 2017-12-28 | End: 2017-12-28

## 2017-12-28 RX ORDER — LACOSAMIDE 50 MG/1
200 TABLET ORAL EVERY 12 HOURS
Qty: 0 | Refills: 0 | Status: DISCONTINUED | OUTPATIENT
Start: 2017-12-28 | End: 2018-01-04

## 2017-12-28 RX ORDER — ASPIRIN/CALCIUM CARB/MAGNESIUM 324 MG
81 TABLET ORAL DAILY
Qty: 0 | Refills: 0 | Status: DISCONTINUED | OUTPATIENT
Start: 2017-12-28 | End: 2018-01-07

## 2017-12-28 RX ORDER — HEPARIN SODIUM 5000 [USP'U]/ML
5000 INJECTION INTRAVENOUS; SUBCUTANEOUS EVERY 12 HOURS
Qty: 0 | Refills: 0 | Status: DISCONTINUED | OUTPATIENT
Start: 2017-12-28 | End: 2018-01-18

## 2017-12-28 RX ADMIN — Medication 40 MILLIEQUIVALENT(S): at 10:50

## 2017-12-28 RX ADMIN — SODIUM CHLORIDE 50 MILLILITER(S): 9 INJECTION, SOLUTION INTRAVENOUS at 04:27

## 2017-12-28 RX ADMIN — ATORVASTATIN CALCIUM 80 MILLIGRAM(S): 80 TABLET, FILM COATED ORAL at 21:39

## 2017-12-28 RX ADMIN — CEFTRIAXONE 100 GRAM(S): 500 INJECTION, POWDER, FOR SOLUTION INTRAMUSCULAR; INTRAVENOUS at 16:39

## 2017-12-28 RX ADMIN — Medication 264 MILLIGRAM(S): at 17:36

## 2017-12-28 RX ADMIN — HEPARIN SODIUM 5000 UNIT(S): 5000 INJECTION INTRAVENOUS; SUBCUTANEOUS at 18:37

## 2017-12-28 RX ADMIN — FOSPHENYTOIN 105 MILLIGRAM(S) PE: 50 INJECTION INTRAMUSCULAR; INTRAVENOUS at 23:14

## 2017-12-28 RX ADMIN — SODIUM CHLORIDE 3 MILLILITER(S): 9 INJECTION INTRAMUSCULAR; INTRAVENOUS; SUBCUTANEOUS at 21:39

## 2017-12-28 RX ADMIN — LEVETIRACETAM 400 MILLIGRAM(S): 250 TABLET, FILM COATED ORAL at 07:58

## 2017-12-28 RX ADMIN — Medication 264 MILLIGRAM(S): at 06:08

## 2017-12-28 RX ADMIN — Medication 1 TABLET(S): at 11:56

## 2017-12-28 RX ADMIN — FOSPHENYTOIN 105 MILLIGRAM(S) PE: 50 INJECTION INTRAMUSCULAR; INTRAVENOUS at 15:46

## 2017-12-28 RX ADMIN — LACOSAMIDE 140 MILLIGRAM(S): 50 TABLET ORAL at 08:39

## 2017-12-28 RX ADMIN — LEVETIRACETAM 400 MILLIGRAM(S): 250 TABLET, FILM COATED ORAL at 21:39

## 2017-12-28 RX ADMIN — SODIUM CHLORIDE 3 MILLILITER(S): 9 INJECTION INTRAMUSCULAR; INTRAVENOUS; SUBCUTANEOUS at 11:56

## 2017-12-28 RX ADMIN — FOSPHENYTOIN 105 MILLIGRAM(S) PE: 50 INJECTION INTRAMUSCULAR; INTRAVENOUS at 09:25

## 2017-12-28 RX ADMIN — CEFTRIAXONE 100 GRAM(S): 500 INJECTION, POWDER, FOR SOLUTION INTRAMUSCULAR; INTRAVENOUS at 06:08

## 2017-12-28 RX ADMIN — LACOSAMIDE 140 MILLIGRAM(S): 50 TABLET ORAL at 18:37

## 2017-12-28 RX ADMIN — SODIUM CHLORIDE 3 MILLILITER(S): 9 INJECTION INTRAMUSCULAR; INTRAVENOUS; SUBCUTANEOUS at 06:07

## 2017-12-28 NOTE — SWALLOW BEDSIDE ASSESSMENT ADULT - SWALLOW EVAL: RECOMMENDED FEEDING/EATING TECHNIQUES
maintain upright posture during/after eating for 30 mins/oral hygiene/alternate food with liquid/small sips/bites/check mouth frequently for oral residue/pocketing/position upright (90 degrees)

## 2017-12-28 NOTE — PROGRESS NOTE ADULT - ASSESSMENT
77 year old man with unknown PMH BIBEMS found outside shoveling snow soaking wet by neighbors, brought to ED, where he had 2 witnessed seizures, treated w/ Ativan and started on keppra, dose increased to 750 mg BID given 2 noted breakthrough seizures on 12/18. 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 CT head showed no intracranial hemorrhage, mass effect, or midline shift. Mild ventriculomegaly superimposed on cerebral volume loss, unchanged.   MRI brain showed abnormal signal involves the right cingulate gyrus-medial frontal parietal cortex. A 1.8 cm x 1 cm nonspecific right parietal scalp lesion is present at the vertex.  On neuro exam today, patient more awake and alert, moving LUE more.         Recommendations:  -c/w keppra 1500 mg BID  -increase phosphenytoin to 125 mg TID as dilantin level was low (8.1)  -c/w vimpat 200 mg BID  -c/w infectious w/u  -LP today for herpes PCR

## 2017-12-28 NOTE — SWALLOW BEDSIDE ASSESSMENT ADULT - COMMENTS
Patient received in bed, alert and cooperative.  Patient able to follow directions provided by clinician.  Patient re-positioned in bed by nursing to be seated upright.

## 2017-12-28 NOTE — PROGRESS NOTE ADULT - PROBLEM SELECTOR PLAN 1
- now controlled, no seizure last 24 hours. Last EEG showed no seizures. Neuro f/u appreciated  - MRI brain (12/26) abnormal signal involves the right cingulate gyrus-medial frontal parietal cortex as described. Some considerations include evolving   subacute ischemia, postictal cortical edema, or encephalitis.  - c/w current regimen of AEDs (fosphenytoin, keppra, vimpat)  , check Phenytoin level  -possible subacute CVA, start ASA 81 mg , c/w lipitor 80mg  -no plan for LP as daughter in law Chelita would not consent the procedure without sedation, wants it done under general anesthesia, risk >benefit, low suspicion for HSV encephalitis, hold off on LP at this time - now controlled, no recurrence of seizures. Last EEG showed no seizures. Neuro f/u appreciated  - MRI brain (12/26) abnormal signal involves the right cingulate gyrus-medial frontal parietal cortex as described. Some considerations include evolving   subacute ischemia, postictal cortical edema, or encephalitis.  - c/w current regimen of AEDs (fosphenytoin, keppra, vimpat)  , check Phenytoin level  -possible subacute CVA, start ASA 81 mg , c/w lipitor 80mg  -no plan for LP as daughter in law Chelita would not consent the procedure without sedation, wants it done under general anesthesia, risk >benefit, low suspicion for HSV encephalitis, hold off on LP at this time

## 2017-12-28 NOTE — PROGRESS NOTE ADULT - PROBLEM SELECTOR PLAN 2
- So far all cultures have been negative  - Concern for underlying viral illness vs HSV encephalitis  -Hold off LP as above, daughter in law Chelita [phone] would not consent to procedure without sedation  - c/w ceftriaxone and acyclovir, f/u ID, clarify duration of abx  - serologies negative, cryptococcal antigen and HIV negative

## 2017-12-28 NOTE — PROGRESS NOTE ADULT - SUBJECTIVE AND OBJECTIVE BOX
CHIEF COMPLAINT: Patient is a 77y old  male who presents with a chief complaint of Seizure x 1 day (16 Dec 2017 01:26)      SUBJECTIVE / OVERNIGHT EVENTS:  pt feels better, denies chest pain/sob/headache, tolerating dysphagia diet    MEDICATIONS  (STANDING):  acyclovir IVPB      acyclovir IVPB 700 milliGRAM(s) IV Intermittent every 12 hours  aspirin enteric coated 81 milliGRAM(s) Oral daily  atorvastatin 80 milliGRAM(s) Oral at bedtime  cefTRIAXone   IVPB 2 Gram(s) IV Intermittent every 12 hours  cefTRIAXone   IVPB      dextrose 5% + sodium chloride 0.9%. 1000 milliLiter(s) (50 mL/Hr) IV Continuous <Continuous>  fosphenytoin IVPB 125 milliGRAM(s) PE IV Intermittent every 8 hours  heparin  Injectable 5000 Unit(s) SubCutaneous every 12 hours  lacosamide IVPB 200 milliGRAM(s) IV Intermittent every 12 hours  levETIRAcetam  IVPB 1500 milliGRAM(s) IV Intermittent every 12 hours  lidocaine 1% Injectable 20 milliLiter(s) Local Injection once  multivitamin 1 Tablet(s) Oral daily  sodium chloride 0.9% lock flush 3 milliLiter(s) IV Push every 8 hours    MEDICATIONS  (PRN):  acetaminophen  Suppository 650 milliGRAM(s) Rectal every 6 hours PRN For Temp greater than 38 C (100.4 F)  hydrALAZINE Injectable 5 milliGRAM(s) IV Push every 6 hours PRN SBP>150  sodium chloride 0.65% Nasal 1 Spray(s) Both Nostrils four times a day PRN Congestion      VITALS:  T(F): 99.3 (12-28-17 @ 05:01), Max: 99.3 (12-28-17 @ 05:01)  HR: 63 (12-28-17 @ 05:01) (63 - 72)  BP: 126/71 (12-28-17 @ 05:01) (117/63 - 167/83)  RR: 18 (12-28-17 @ 05:01) (18 - 19)  SpO2: 95% (12-28-17 @ 05:01)      CAPILLARY BLOOD GLUCOSE    Output     I&O's Summary  T(F): 99.3 (12-28-17 @ 05:01), Max: 99.3 (12-28-17 @ 05:01)  HR: 63 (12-28-17 @ 05:01) (63 - 72)  BP: 126/71 (12-28-17 @ 05:01) (117/63 - 167/83)  RR: 18 (12-28-17 @ 05:01) (18 - 19)  SpO2: 95% (12-28-17 @ 05:01)    PHYSICAL EXAM:  GENERAL: NAD, well-developed  HEAD:  Atraumatic, Normocephalic  EYES: EOMI, PERRLA, conjunctiva and sclera clear  NECK: Supple, No JVD  CHEST/LUNG: Clear to auscultation bilaterally; No wheeze  HEART: Regular rate and rhythm; No murmurs, rubs, or gallops  ABDOMEN: Soft, Nontender, Nondistended; Bowel sounds present  EXTREMITIES:  2+ Peripheral Pulses, No clubbing, cyanosis, or edema  PSYCH: AAOx1  NEUROLOGY: More awake, follows commands, Left hemiparesis  SKIN: No rashes or lesions  LABS:              11.6                 x    | x    | x            14.42 >-----------< 490     ------------------------< x                     34.9                 x    | x    | x                                            Ca x     Mg x     Ph x            INR: 1.54<H>;    PT: 17.2 SEC<H>;    PTT: 32.6 SEC      MICROBIOLOGY:    RADIOLOGY & ADDITIONAL TESTS:    Imaging Personally Reviewed:  < from: MR Head No Cont (12.26.17 @ 11:05) >    Abnormal signal involves the right cingulate gyrus-medial frontal  parietal cortex as described. Some considerations include evolving   subacute ischemia, postictal cortical edema, or encephalitis.    A 1.8 cm x 1 cm nonspecific right parietal scalp lesion is present at the   vertex. Although this could reflect a sebaceous cyst, correlate with   direct visualization findings to exclude other etiologies for scalp   lesions.      [ ] Consultant(s) Notes Reviewed:  [ ] Care Discussed with Consultants/Other Providers:

## 2017-12-28 NOTE — CHART NOTE - NSCHARTNOTEFT_GEN_A_CORE
Procedure team consulted for LP.    Pt seen and examined at time of examination does not have capacity to consent for LP.  I discussed the procedure, indications, risks, and benefits with the patient's health care proxy, Chelita Pereyra. The patient's healthcare proxy will not consent to LP without sedation.  Discussed with the primary team, will hold off on LP at this time, consider neuroradiology vs IR for LP with anesthesiology.    Please reconsult as needed.    GRETA Reid MD PGY-2  Pager [phone]

## 2017-12-28 NOTE — PROGRESS NOTE ADULT - ATTENDING COMMENTS
No recurrence of seizures.  MS continues to improve as is the strength in the LUE.  For repeat EEG  and LP today. Continue with present AED's.

## 2017-12-28 NOTE — PROGRESS NOTE ADULT - SUBJECTIVE AND OBJECTIVE BOX
Subjective:Interval History - No events overnight  Patient states he's feeling better    Objective: Vital Signs Last 24 Hrs  T(C): 37.4 (28 Dec 2017 05:01), Max: 37.4 (28 Dec 2017 05:01)  T(F): 99.3 (28 Dec 2017 05:01), Max: 99.3 (28 Dec 2017 05:01)  HR: 63 (28 Dec 2017 05:01) (63 - 72)  BP: 126/71 (28 Dec 2017 05:01) (117/63 - 167/83)  BP(mean): --  RR: 18 (28 Dec 2017 05:01) (18 - 19)  SpO2: 95% (28 Dec 2017 05:01) (95% - 97%)    Mental Status: Awake and alert Orientated to self and hospital. No dysarthria, aphasia or neglect. follows commands    Cranial Nerves: PERRL, EOMI, blinks to threat bilaterally, no nystagmus or diplopia.  Decreased left naso-labial fold.     Motor:  Tone: Increased tone LUE.                   Strength: LUE 3/5, moving RUE spontaneously, not moving LE, LE's in contracture    MEDICATIONS  (STANDING):  acyclovir IVPB      acyclovir IVPB 700 milliGRAM(s) IV Intermittent every 12 hours  atorvastatin 80 milliGRAM(s) Oral at bedtime  cefTRIAXone   IVPB 2 Gram(s) IV Intermittent every 12 hours  cefTRIAXone   IVPB      dextrose 5% + sodium chloride 0.9%. 1000 milliLiter(s) (50 mL/Hr) IV Continuous <Continuous>  fosphenytoin IVPB 125 milliGRAM(s) PE IV Intermittent every 8 hours  lacosamide IVPB      lacosamide IVPB 200 milliGRAM(s) IV Intermittent every 12 hours  levETIRAcetam  IVPB 1500 milliGRAM(s) IV Intermittent every 12 hours  lidocaine 1% Injectable 20 milliLiter(s) Local Injection once  multivitamin 1 Tablet(s) Oral daily  sodium chloride 0.9% lock flush 3 milliLiter(s) IV Push every 8 hours    MEDICATIONS  (PRN):  acetaminophen  Suppository 650 milliGRAM(s) Rectal every 6 hours PRN For Temp greater than 38 C (100.4 F)  hydrALAZINE Injectable 5 milliGRAM(s) IV Push every 6 hours PRN SBP>150  sodium chloride 0.65% Nasal 1 Spray(s) Both Nostrils four times a day PRN Congestion

## 2017-12-29 LAB
BUN SERPL-MCNC: 17 MG/DL — SIGNIFICANT CHANGE UP (ref 7–23)
CALCIUM SERPL-MCNC: 8.4 MG/DL — SIGNIFICANT CHANGE UP (ref 8.4–10.5)
CHLORIDE SERPL-SCNC: 101 MMOL/L — SIGNIFICANT CHANGE UP (ref 98–107)
CO2 SERPL-SCNC: 25 MMOL/L — SIGNIFICANT CHANGE UP (ref 22–31)
CREAT SERPL-MCNC: 0.92 MG/DL — SIGNIFICANT CHANGE UP (ref 0.5–1.3)
GLUCOSE SERPL-MCNC: 124 MG/DL — HIGH (ref 70–99)
HCT VFR BLD CALC: 38.8 % — LOW (ref 39–50)
HGB BLD-MCNC: 12.6 G/DL — LOW (ref 13–17)
MCHC RBC-ENTMCNC: 28.6 PG — SIGNIFICANT CHANGE UP (ref 27–34)
MCHC RBC-ENTMCNC: 32.5 % — SIGNIFICANT CHANGE UP (ref 32–36)
MCV RBC AUTO: 88.2 FL — SIGNIFICANT CHANGE UP (ref 80–100)
NRBC # FLD: 0 — SIGNIFICANT CHANGE UP
PHENYTOIN FREE SERPL-MCNC: 4.9 UG/ML — LOW (ref 10–20)
PLATELET # BLD AUTO: 629 K/UL — HIGH (ref 150–400)
PMV BLD: 8.7 FL — SIGNIFICANT CHANGE UP (ref 7–13)
POTASSIUM SERPL-MCNC: 3.5 MMOL/L — SIGNIFICANT CHANGE UP (ref 3.5–5.3)
POTASSIUM SERPL-SCNC: 3.5 MMOL/L — SIGNIFICANT CHANGE UP (ref 3.5–5.3)
RBC # BLD: 4.4 M/UL — SIGNIFICANT CHANGE UP (ref 4.2–5.8)
RBC # FLD: 14.1 % — SIGNIFICANT CHANGE UP (ref 10.3–14.5)
SODIUM SERPL-SCNC: 138 MMOL/L — SIGNIFICANT CHANGE UP (ref 135–145)
WBC # BLD: 14.58 K/UL — HIGH (ref 3.8–10.5)
WBC # FLD AUTO: 14.58 K/UL — HIGH (ref 3.8–10.5)

## 2017-12-29 PROCEDURE — 99233 SBSQ HOSP IP/OBS HIGH 50: CPT

## 2017-12-29 PROCEDURE — 74230 X-RAY XM SWLNG FUNCJ C+: CPT | Mod: 26

## 2017-12-29 PROCEDURE — 99232 SBSQ HOSP IP/OBS MODERATE 35: CPT | Mod: GC

## 2017-12-29 RX ORDER — HYDRALAZINE HCL 50 MG
5 TABLET ORAL ONCE
Qty: 0 | Refills: 0 | Status: DISCONTINUED | OUTPATIENT
Start: 2017-12-29 | End: 2017-12-29

## 2017-12-29 RX ORDER — DEXTROSE MONOHYDRATE, SODIUM CHLORIDE, AND POTASSIUM CHLORIDE 50; .745; 4.5 G/1000ML; G/1000ML; G/1000ML
1000 INJECTION, SOLUTION INTRAVENOUS
Qty: 0 | Refills: 0 | Status: DISCONTINUED | OUTPATIENT
Start: 2017-12-29 | End: 2018-01-09

## 2017-12-29 RX ADMIN — LEVETIRACETAM 400 MILLIGRAM(S): 250 TABLET, FILM COATED ORAL at 20:04

## 2017-12-29 RX ADMIN — ATORVASTATIN CALCIUM 80 MILLIGRAM(S): 80 TABLET, FILM COATED ORAL at 22:30

## 2017-12-29 RX ADMIN — FOSPHENYTOIN 105 MILLIGRAM(S) PE: 50 INJECTION INTRAMUSCULAR; INTRAVENOUS at 05:55

## 2017-12-29 RX ADMIN — DEXTROSE MONOHYDRATE, SODIUM CHLORIDE, AND POTASSIUM CHLORIDE 50 MILLILITER(S): 50; .745; 4.5 INJECTION, SOLUTION INTRAVENOUS at 18:04

## 2017-12-29 RX ADMIN — Medication 1 TABLET(S): at 11:43

## 2017-12-29 RX ADMIN — CEFTRIAXONE 100 GRAM(S): 500 INJECTION, POWDER, FOR SOLUTION INTRAMUSCULAR; INTRAVENOUS at 17:11

## 2017-12-29 RX ADMIN — Medication 264 MILLIGRAM(S): at 06:19

## 2017-12-29 RX ADMIN — CEFTRIAXONE 100 GRAM(S): 500 INJECTION, POWDER, FOR SOLUTION INTRAMUSCULAR; INTRAVENOUS at 06:38

## 2017-12-29 RX ADMIN — LACOSAMIDE 140 MILLIGRAM(S): 50 TABLET ORAL at 18:04

## 2017-12-29 RX ADMIN — HEPARIN SODIUM 5000 UNIT(S): 5000 INJECTION INTRAVENOUS; SUBCUTANEOUS at 06:33

## 2017-12-29 RX ADMIN — LEVETIRACETAM 400 MILLIGRAM(S): 250 TABLET, FILM COATED ORAL at 08:00

## 2017-12-29 RX ADMIN — Medication 81 MILLIGRAM(S): at 11:42

## 2017-12-29 RX ADMIN — SODIUM CHLORIDE 3 MILLILITER(S): 9 INJECTION INTRAMUSCULAR; INTRAVENOUS; SUBCUTANEOUS at 14:04

## 2017-12-29 RX ADMIN — SODIUM CHLORIDE 3 MILLILITER(S): 9 INJECTION INTRAMUSCULAR; INTRAVENOUS; SUBCUTANEOUS at 06:33

## 2017-12-29 RX ADMIN — Medication 264 MILLIGRAM(S): at 17:11

## 2017-12-29 RX ADMIN — SODIUM CHLORIDE 3 MILLILITER(S): 9 INJECTION INTRAMUSCULAR; INTRAVENOUS; SUBCUTANEOUS at 22:31

## 2017-12-29 RX ADMIN — HEPARIN SODIUM 5000 UNIT(S): 5000 INJECTION INTRAVENOUS; SUBCUTANEOUS at 17:12

## 2017-12-29 RX ADMIN — LACOSAMIDE 140 MILLIGRAM(S): 50 TABLET ORAL at 05:55

## 2017-12-29 RX ADMIN — SODIUM CHLORIDE 50 MILLILITER(S): 9 INJECTION, SOLUTION INTRAVENOUS at 00:37

## 2017-12-29 RX ADMIN — FOSPHENYTOIN 105 MILLIGRAM(S) PE: 50 INJECTION INTRAMUSCULAR; INTRAVENOUS at 13:38

## 2017-12-29 RX ADMIN — FOSPHENYTOIN 105 MILLIGRAM(S) PE: 50 INJECTION INTRAMUSCULAR; INTRAVENOUS at 22:31

## 2017-12-29 NOTE — PROGRESS NOTE ADULT - PROBLEM SELECTOR PLAN 1
- now controlled, no recurrence of seizures.  neuro f/u appreciated  -last EEG (12/27) Mildly abnormal EEG study in the awake and drowsy states due to mild diffuse or multifocal cerebral dysfunction. No epileptic pattern or seizure seen.  - MRI brain (12/26) abnormal signal involves the right cingulate gyrus-medial frontal parietal cortex as described. Some considerations include evolving   subacute ischemia, postictal cortical edema, or encephalitis.  - c/w current regimen of AEDs (fosphenytoin, keppra, vimpat), f/u dilantin level  -c/w ASA 81 mg and lipitor 80mg for possible subacute CVA  -no plan for LP as daughter in law Chelita would not consent the procedure without sedation, wants it done under general anesthesia, risk >benefit, low suspicion for HSV encephalitis, hold off on LP at this time - now controlled, no recurrence of seizures.  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 No epileptic pattern or seizure seen.  - MRI brain (12/26) abnormal signal involves the right cingulate gyrus-medial frontal parietal cortex as described. Some considerations include evolving   subacute ischemia, postictal cortical edema, or encephalitis.  - c/w current regimen of AEDs (fosphenytoin, keppra, vimpat), f/u dilantin level  -c/w ASA 81 mg and lipitor 80mg for possible subacute CVA  -no plan for LP as daughter in law Chelita would not consent the procedure without sedation, wants it done under general anesthesia, risk >benefit, low suspicion for HSV encephalitis, hold off on LP at this time

## 2017-12-29 NOTE — SWALLOW VFSS/MBS ASSESSMENT ADULT - COMMENTS
Patient is a 77 year old male with dementia who presented to ED in obtunded state; hospital course complicated by seizures, fever, SIRS, ?meningitis vs encephalitis. Pt known to this department from several encounters during this acute care stay; last seen for a clinical reassessment of swallow function yesterday 12/28/17 (please see chart for SLP documentation). Pt was received in the radiology suite this AM at which time he was awake and alert with confusion; patient required continueous verbal/tactile cueing to elevate Patient is a 77 year old male with dementia who presented to ED in obtunded state; hospital course complicated by seizures, fever, SIRS, ?meningitis vs encephalitis. Pt known to this department from several encounters during this acute care stay; last seen for a clinical reassessment of swallow function yesterday 12/28/17 (please see chart for SLP documentation). Pt was received in the radiology suite this AM at which time he was awake and alert with confusion. Pt required consistent verbal/tactile cueing to elevate his head/torso to upright position due to continuous leaning forward. Patient is a 77 year old male with dementia who presented to ED in obtunded state; hospital course complicated by seizures, fever, SIRS, ?meningitis vs encephalitis. Pt known to this department from several encounters during this acute care stay; last seen for a clinical reassessment of swallow function yesterday 12/28/17 (please see chart for SLP documentation). Pt was received in the radiology suite this AM at which time he was awake and alert with confusion. Pt required consistent verbal/tactile cueing to elevate his head/torso due to continuous leaning forward with head down position.

## 2017-12-29 NOTE — DISCHARGE NOTE ADULT - INSTRUCTIONS
PT - rehab   NPO with Jevity 1.2 (total 1512 mL at 24 hours, start 20 mL/hour - increase 15/mL per 4 hours to goal of 63 mL/hour)

## 2017-12-29 NOTE — PROGRESS NOTE ADULT - SUBJECTIVE AND OBJECTIVE BOX
Follow Up:  concern for meningtis/encephalitis    Interval History/ROS:    Allergies  No Known Allergies        ANTIMICROBIALS:  acyclovir IVPB    acyclovir IVPB 700 every 12 hours  cefTRIAXone   IVPB 2 every 12 hours  cefTRIAXone   IVPB        OTHER MEDS:  MEDICATIONS  (STANDING):  acetaminophen  Suppository 650 every 6 hours PRN  aspirin enteric coated 81 daily  atorvastatin 80 at bedtime  fosphenytoin IVPB 125 every 8 hours  heparin  Injectable 5000 every 12 hours  lacosamide IVPB 200 every 12 hours  levETIRAcetam  IVPB 1500 every 12 hours      Vital Signs Last 24 Hrs  T(C): 37.1 (29 Dec 2017 03:34), Max: 37.8 (28 Dec 2017 23:28)  T(F): 98.7 (29 Dec 2017 03:34), Max: 100.1 (28 Dec 2017 23:28)  HR: 74 (29 Dec 2017 11:20) (65 - 74)  BP: 136/68 (29 Dec 2017 11:20) (102/86 - 136/82)  BP(mean): --  RR: 17 (29 Dec 2017 11:20) (17 - 18)  SpO2: 97% (29 Dec 2017 11:20) (96% - 99%)    PHYSICAL EXAM:  General: non-toxic axox1  HEAD/EYES: anicteric, PERRL  ENT:  supple  Cardiovascular:   S1, S2  Respiratory:  clear bilaterally  GI:  soft, non-tender, normal bowel sounds  :  no CVA tenderness   Musculoskeletal:  no synovitis  Neurologic:  lt LE 2+ RT LE 4+ UE intact  Skin:  no rash  Lymph: no lymphadenopathy  Psychiatric:  appropriate affect  Vascular:  no phlebitis                                11.6   14.42 )-----------( 490      ( 28 Dec 2017 09:12 )             34.9       12-28    136  |  100  |  13  ----------------------------<  182<H>  3.3<L>   |  22  |  0.82    Ca    8.1<L>      28 Dec 2017 09:06  Mg     2.0     12-28            MICROBIOLOGY:  v  URINE MIDSTREAM  12-22-17 --  --  --      BLOOD PERIPHERAL  12-22-17 --  --  --                RADIOLOGY: Follow Up:  concern for meningtis/encephalitis    Interval History/ROS: no complaint.  does not recall circumstances surrounding his admission.  knows hospital and .    Allergies  No Known Allergies        ANTIMICROBIALS:  acyclovir IVPB    acyclovir IVPB 700 every 12 hours  cefTRIAXone   IVPB 2 every 12 hours  cefTRIAXone   IVPB        OTHER MEDS:  MEDICATIONS  (STANDING):  acetaminophen  Suppository 650 every 6 hours PRN  aspirin enteric coated 81 daily  atorvastatin 80 at bedtime  fosphenytoin IVPB 125 every 8 hours  heparin  Injectable 5000 every 12 hours  lacosamide IVPB 200 every 12 hours  levETIRAcetam  IVPB 1500 every 12 hours      Vital Signs Last 24 Hrs  T(C): 37.1 (29 Dec 2017 03:34), Max: 37.8 (28 Dec 2017 23:28)  T(F): 98.7 (29 Dec 2017 03:34), Max: 100.1 (28 Dec 2017 23:28)  HR: 74 (29 Dec 2017 11:20) (65 - 74)  BP: 136/68 (29 Dec 2017 11:20) (102/86 - 136/82)  BP(mean): --  RR: 17 (29 Dec 2017 11:20) (17 - 18)  SpO2: 97% (29 Dec 2017 11:20) (96% - 99%)    PHYSICAL EXAM:  General: non-toxic axox1  HEAD/EYES: anicteric, PERRL  ENT:  supple  Cardiovascular:   S1, S2  Respiratory:  clear bilaterally  GI:  soft, non-tender, normal bowel sounds  :  no CVA tenderness   Musculoskeletal:  no synovitis  Neurologic:  lt LE 2+ RT LE 4+ UE intact  Skin:  no rash  Lymph: no lymphadenopathy  Psychiatric:  appropriate affect  Vascular:  no phlebitis                                11.6   14.42 )-----------( 490      ( 28 Dec 2017 09:12 )             34.9           136  |  100  |  13  ----------------------------<  182<H>  3.3<L>   |  22  |  0.82    Ca    8.1<L>      28 Dec 2017 09:06  Mg     2.0                 MICROBIOLOGY:  v  URINE MIDSTREAM  17 --  --  --      BLOOD PERIPHERAL  17 --  --  --                RADIOLOGY:

## 2017-12-29 NOTE — PROGRESS NOTE ADULT - SUBJECTIVE AND OBJECTIVE BOX
CHIEF COMPLAINT: Patient is a 77y old  male who presents with a chief complaint of Seizure x 1 day (16 Dec 2017 01:26)      SUBJECTIVE / OVERNIGHT EVENTS:  pt feels ok, no specific complaints, tolerating dysphagia diet with assistance    MEDICATIONS  (STANDING):  acyclovir IVPB      acyclovir IVPB 700 milliGRAM(s) IV Intermittent every 12 hours  aspirin enteric coated 81 milliGRAM(s) Oral daily  atorvastatin 80 milliGRAM(s) Oral at bedtime  cefTRIAXone   IVPB 2 Gram(s) IV Intermittent every 12 hours  cefTRIAXone   IVPB      dextrose 5% + sodium chloride 0.9% with potassium chloride 20 mEq/L 1000 milliLiter(s) (50 mL/Hr) IV Continuous <Continuous>  fosphenytoin IVPB 125 milliGRAM(s) PE IV Intermittent every 8 hours  heparin  Injectable 5000 Unit(s) SubCutaneous every 12 hours  lacosamide IVPB 200 milliGRAM(s) IV Intermittent every 12 hours  levETIRAcetam  IVPB 1500 milliGRAM(s) IV Intermittent every 12 hours  lidocaine 1% Injectable 20 milliLiter(s) Local Injection once  multivitamin 1 Tablet(s) Oral daily  sodium chloride 0.9% lock flush 3 milliLiter(s) IV Push every 8 hours    MEDICATIONS  (PRN):  acetaminophen  Suppository 650 milliGRAM(s) Rectal every 6 hours PRN For Temp greater than 38 C (100.4 F)  sodium chloride 0.65% Nasal 1 Spray(s) Both Nostrils four times a day PRN Congestion      VITALS:  T(F): 98.7 (12-29-17 @ 03:34), Max: 100.1 (12-28-17 @ 23:28)  HR: 74 (12-29-17 @ 11:20) (65 - 74)  BP: 136/68 (12-29-17 @ 11:20) (102/86 - 136/82)  RR: 17 (12-29-17 @ 11:20) (17 - 18)  SpO2: 97% (12-29-17 @ 11:20)      CAPILLARY BLOOD GLUCOSE    Output     I&O's Summary  T(F): 98.7 (12-29-17 @ 03:34), Max: 100.1 (12-28-17 @ 23:28)  HR: 74 (12-29-17 @ 11:20) (65 - 74)  BP: 136/68 (12-29-17 @ 11:20) (102/86 - 136/82)  RR: 17 (12-29-17 @ 11:20) (17 - 18)  SpO2: 97% (12-29-17 @ 11:20)    PHYSICAL EXAM:  GENERAL: NAD, well-developed  HEAD:  Atraumatic, Normocephalic  EYES: EOMI, PERRLA, conjunctiva and sclera clear  NECK: Supple, No JVD  CHEST/LUNG: Clear to auscultation bilaterally; No wheeze  HEART: Regular rate and rhythm; No murmurs, rubs, or gallops  ABDOMEN: Soft, Nontender, Nondistended; Bowel sounds present  EXTREMITIES:  2+ Peripheral Pulses, No clubbing, cyanosis, or edema  PSYCH: AAOx1-2, dementia  NEUROLOGY: left hemiparesis  SKIN: No rashes or lesions    LABS:              11.6                 x    | x    | x            14.42 >-----------< 490     ------------------------< x                     34.9                 x    | x    | x                                            Ca x     Mg x     Ph x            INR: 1.54<H>;    PT: 17.2 SEC<H>;    PTT: 32.6 SEC                MICROBIOLOGY:        RADIOLOGY & ADDITIONAL TESTS:    Imaging Personally Reviewed:    [ ] Consultant(s) Notes Reviewed:  [ ] Care Discussed with Consultants/Other Providers:

## 2017-12-29 NOTE — SWALLOW VFSS/MBS ASSESSMENT ADULT - ROSENBEK'S PENETRATION ASPIRATION SCALE
(2) contrast enters airway, remains above the vocal cords, no residue remains (penetration) (3) contrast remains above the vocal cords, visible residue remains (penetration)

## 2017-12-29 NOTE — DISCHARGE NOTE ADULT - MEDICATION SUMMARY - MEDICATIONS TO TAKE
I will START or STAY ON the medications listed below when I get home from the hospital:    sodium chloride 0.9% lock flush  -- 3 milliliter(s) intravenous every 8 hours  -- Indication: For Water     aspirin 81 mg oral tablet, chewable  -- 1 tab(s) by mouth once a day  -- Indication: For Prophylactic measure     levETIRAcetam 100 mg/mL oral solution  -- 15 milliliter(s) by mouth 2 times a day  -- Indication: For Seizure    amLODIPine 5 mg oral tablet  -- 1 tab(s) by mouth once a day  -- Indication: For Hypertension, unspecified type    hydrocortisone 1% topical cream  -- 1 application on skin 2 times a day  -- Indication: For Topical cream    sodium chloride 0.65% nasal spray  -- 1 spray(s) into nose 4 times a day, As Needed for congestion  -- Indication: For Nasal spray    Multiple Vitamins oral tablet  -- 1 tab(s) by mouth once a day  -- Indication: For Supplement I will START or STAY ON the medications listed below when I get home from the hospital:    aspirin 81 mg oral tablet, chewable  -- 1 tab(s) by mouth once a day  -- Indication: For Prophylactic measure     levETIRAcetam 100 mg/mL oral solution  -- 15 milliliter(s) by mouth 2 times a day  -- Indication: For Seizure    amLODIPine 5 mg oral tablet  -- 1 tab(s) by mouth once a day  -- Indication: For Hypertension, unspecified type    hydrocortisone 1% topical cream  -- 1 application on skin 2 times a day  -- Indication: For Topical cream    sodium chloride 0.65% nasal spray  -- 1 spray(s) into nose 4 times a day, As Needed for congestion  -- Indication: For Nasal spray    Multiple Vitamins oral tablet  -- 1 tab(s) by mouth once a day  -- Indication: For Supplement

## 2017-12-29 NOTE — PROGRESS NOTE ADULT - PROBLEM SELECTOR PLAN 2
- So far all cultures have been negative  - Concern for underlying viral illness vs HSV encephalitis  -Hold off LP as above, daughter in law Chelita [phone] would not consent to procedure without sedation  - c/w ceftriaxone and acyclovir, f/u ID, clarify duration of abx  - serologies negative, cryptococcal antigen and HIV negative

## 2017-12-29 NOTE — PROGRESS NOTE ADULT - ATTENDING COMMENTS
77 year old male with Dementia (unknown baseline) presented with seizures.     Patient unable to provide any history at this time.     The patient was apparently seizing while shoveling snow and had EMS called by a concerned neighbor. The patient was found soaking wet and in the ED was found to have 2 seizures and was given 2mg Ativan IV.     In the ED he was initially afebrile, tachycardic, hypertensive to > 200 SBP, leukocytosis 14.62 on presentation. UA neg, CXR clear. CT Head without acute abnormality.     Developed fevers during hospital stay. 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    MRI with Abnormal signal involves the right cingulate gyrus-medial frontal parietal cortex as described. Some considerations include evolving   subacute ischemia, postictal cortical edema, or encephalitis.    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 in the setting of right frontal seizure activity   -Leukocytosis  -Mental status improving    Recommend:  -Continue ceftriaxone/ acyclovir.  -Discontinue vanco/ ampicillin  -LP if family agrees  -F/U neurology  -F/U bld cxs.  -IVF with acyclovir  -Trend WBC.  -Continue to monitor temperature curve. 78 yo man admitted 12/15 with seizure, fever.  LP not done as of yet.  Planned tomorrow.  Receiving empiric ceftriaxone and iv acyclovir for possible meningitis/ encephalitis. Has been on ceftriaxone since 12/23 and acyclovir 12/23. If not complete 10 days.  If LP done please check CSF PCR panel.    ID service will follow over weekend.  I will be away until 1/8/18.

## 2017-12-29 NOTE — DISCHARGE NOTE ADULT - CARE PROVIDER_API CALL
[facility],   Phone: [phone]  Fax: (   )    -    [facility],   Phone: [phone]  Fax: (   )    -

## 2017-12-29 NOTE — SWALLOW VFSS/MBS ASSESSMENT ADULT - NS SWALLOW VFSS REC ASPIR MON
change of breathing pattern/position upright (90Y)/gurgly voice/upper respiratory infection/fever/pneumonia/throat clearing/oral hygiene/cough

## 2017-12-29 NOTE — PROGRESS NOTE ADULT - ASSESSMENT
77 year old man with unknown PMH BIBEMS found outside shoveling snow soaking wet by neighbors, brought to ED, where he had 2 witnessed seizures, treated w/ Ativan and started on keppra, dose increased to 750 mg BID given 2 noted breakthrough seizures on 12/18. 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 CT head showed no intracranial hemorrhage, mass effect, or midline shift. Mild ventriculomegaly superimposed on cerebral volume loss, unchanged.   MRI brain showed abnormal signal involves the right cingulate gyrus-medial frontal parietal cortex. A 1.8 cm x 1 cm nonspecific right parietal scalp lesion is present at the vertex.  On neuro exam today, patient more awake and alert, moving LUE more.    Recommendations:  -c/w keppra 1500 mg BID  -c/w phosphenytoin 125 mg TID  -c/w vimpat 200 mg BID  -c/w infectious w/u  -Continue full course of acyclovir if LP cannot be performed

## 2017-12-29 NOTE — DISCHARGE NOTE ADULT - HOSPITAL COURSE
78 yo M with dementia (unclear baseline), p/w episode of obtundation with course c/b seizures x 2 in the ED that initially resolved with Ativan. 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 Hospital course now complicated by fever, SIRS, cannot r/o meningitis vs encephalitis all cultures have been negative serologies negative, cryptococcal antigen and HIV negative started on ceftriaxone and acyclovir . Patient started on fosphenytoin, keppra, vimpat now controlled, no recurrence of seizures. Last EEG showed no seizures. MRI brain on 12/26 showed abnormal signal involves the right cingulate gyrus-medial frontal parietal cortex as described. Some considerations include evolving subacute ischemia, postictal cortical edema, or encephalitis. No plan for LP as daughter in law Chelita would not consent the procedure without sedation, wants it done under general anesthesia, risk >benefit, low suspicion for HSV encephalitis, will defer LP at this time. PT  recommended Rehab. Stepdaughter- Chelita now wants to take pt home w/ private hire. Transferred to [facility]. 78 yo M with dementia (unclear baseline), p/w episode of obtundation with course c/b seizures x 2 in the ED that initially resolved with Ativan. 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 Hospital course now complicated by fever, SIRS, cannot r/o meningitis vs encephalitis all cultures have been negative serologies negative, cryptococcal antigen and HIV negative started on ceftriaxone and acyclovir . Patient started on fosphenytoin, keppra, vimpat now controlled, no recurrence of seizures. Last EEG showed no seizures. MRI brain on 12/26 showed abnormal signal involves the right cingulate gyrus-medial frontal parietal cortex as described. Some considerations include evolving subacute ischemia, postictal cortical edema, or encephalitis. No plan for LP as daughter in law Chelita would not consent the procedure without sedation, wants it done under general anesthesia, risk >benefit, low suspicion for HSV encephalitis, will defer LP at this time. PT  recommended Rehab.     Transaminitis.   -GI Cx - may be drug-induced due to anti-epileptics -----> Dilantin, Vimpat, Lipitor d'jeannette    -US abdomen negative, hepatitis panel negative, AMA/ ASMA/ anti-LKM negative    -Slowly improving  -F/U outpatient GI, trend LFTs at rehab ----> if worsens, may need liver biopsy outpatient    Tic.    -Etiology unclear  -Neuro Cx - re-evaluation - continue current management   -Stable, F/U outpatient Neurology clinic     Seizure.  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 No epileptic pattern or seizure seen.  -Neuro Cx - continue Keppra   -Seizure precations   -No seizure recurrence   -Stable, F/U outpatient Neurology clinic     Possible subacute CVA  -D/c'ed Lipitor 2/2 elevated LFTs  -ASA  -Stable, F/U outpatient PCP     Sepsis.  -Not septic, persistent leukocytosis   -CT A/P - no acute etiology   -RVP negative, BCx negative   -ID Cx - S/P empiric antibiotics/antivirals   -Stable for discharge to rehab, F/U outpatient PCP     Hypertensive urgency.   -Norvasc  -Resolved  -Stable, F/U outpatient PCP     Hyponatremia.   -Resolved, now has hypernatremia ----> addition of free water via PEG   -Stable, F/U outpatient PCP     Hypophosphatemia  -Resolved.-Stable, F/U outpatient PCP     Dysphagia.   -Failed Speech & Swallow - S/P PEG placement   -Nutritional Cx - NPO with tube feeds, free water via  mL/8 hours -----> may repeat Speech & Swallow at rehab and start pleasure feeds if family understand risk of aspiratio-Stable, F/U outpatient PCP     Dementia without behavioral disturbance, unspecified dementia type.    -Supportive care  -Psych Cx - no capacity to make medical decisions   -Off Seroquel  -Stable, F/U outpatient PCP     Essential hypertension.    -Norvasc  -Stable, F/U outpatient PCP    Discharge to rehab 76 yo M with dementia (unclear baseline), p/w episode of obtundation with course c/b seizures x 2 in the ED that initially resolved with Ativan. 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 Hospital course now complicated by fever, SIRS, cannot r/o meningitis vs encephalitis all cultures have been negative serologies negative, cryptococcal antigen and HIV negative started on ceftriaxone and acyclovir . Patient started on fosphenytoin, keppra, vimpat now controlled, no recurrence of seizures. Last EEG showed no seizures. MRI brain on 12/26 showed abnormal signal involves the right cingulate gyrus-medial frontal parietal cortex as described. Some considerations include evolving subacute ischemia, postictal cortical edema, or encephalitis. No plan for LP as daughter in law Chelita would not consent the procedure without sedation, wants it done under general anesthesia, risk >benefit, low suspicion for HSV encephalitis, will defer LP at this time. PT  recommended Rehab.     Transaminitis.   -GI Cx - may be drug-induced due to anti-epileptics -----> Dilantin, Vimpat, Lipitor d'jeannette    -US abdomen negative, hepatitis panel negative, AMA/ ASMA/ anti-LKM negative    -Slowly improving  -F/U outpatient GI, trend LFTs at rehab ----> if worsens, may need liver biopsy outpatient    Tic.    -Etiology unclear  -Neuro Cx - re-evaluation - continue current management   -Stable, F/U outpatient Neurology clinic     Seizure.  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 No epileptic pattern or seizure seen.  -Neuro Cx - continue Keppra   -Seizure precations   -No seizure recurrence   -Stable, F/U outpatient Neurology clinic     Possible subacute CVA  -D/c'ed Lipitor 2/2 elevated LFTs  -ASA  -Stable, F/U outpatient PCP     Sepsis.  -Not septic, persistent leukocytosis   -CT A/P - no acute etiology   -RVP negative, BCx negative   -ID Cx - S/P empiric antibiotics/antivirals   -Stable for discharge to rehab, F/U outpatient PCP     Hypertensive urgency.   -Norvasc  -Resolved  -Stable, F/U outpatient PCP     Hyponatremia.   -Resolved, now has hypernatremia ----> addition of free water via PEG   -Stable, F/U outpatient PCP     Hypophosphatemia  -Resolved.  -Stable, F/U outpatient PCP     Dysphagia.   -Failed Speech & Swallow - S/P PEG placement   -Nutritional Cx - NPO with tube feeds, free water via  mL/8 hours -----> may repeat Speech & Swallow at rehab and start pleasure feeds if family understand risk of aspiratio-Stable, F/U outpatient PCP     Dementia without behavioral disturbance, unspecified dementia type.    -Supportive care  -Psych Cx - no capacity to make medical decisions   -Off Seroquel  -Stable, F/U outpatient PCP     Essential hypertension.    -Norvasc  -Stable, F/U outpatient PCP    Discharge to rehab

## 2017-12-29 NOTE — PROGRESS NOTE ADULT - ATTENDING COMMENTS
Agree with above. Pt's sensorium is improving daily. No witnessed seizure activity. Will proceed with full course Acyclovir if LP not performed. Progressive ambulation.  More Hx regarding baseline MS from family.

## 2017-12-29 NOTE — DISCHARGE NOTE ADULT - PROVIDER TOKENS
FREE:[LAST:[[facility]],PHONE:[phone]],FAX:[(   )    -]],FREE:[LAST:[[facility]],PHONE:[phone]],FAX:[(   )    -]]

## 2017-12-29 NOTE — SWALLOW VFSS/MBS ASSESSMENT ADULT - DIAGNOSTIC IMPRESSIONS
1) The patient demonstrates a Moderate Oral Dysphagia characterized by slow/weak lingual motion with prolonged bolus collection, manipulation and anterior-posterior transport  2) The patient demonstrates a Moderate to Severe Pharyngeal Dysphagia characterized by a significantly delayed swallow trigger with the bolus head reaching to the valleculae followed by repetitive regurgitation into the oral cavity prior to triggering the pharyngeal swallow. 1) The patient demonstrates a Moderate Oral Dysphagia characterized by slow/weak lingual motion with prolonged bolus collection, manipulation and anterior-posterior transport. Piecemeal deglutition observed.   2) The patient demonstrates a Moderate to Severe Pharyngeal Dysphagia characterized by a significantly delayed swallow trigger with the bolus reaching to the valleculae with repetitive regurgitation from the valleculae into the oral cavity prior to triggering the pharyngeal swallow. The patient demonstrates a Moderate Oral Dysphagia and Severe Pharyngeal Dysphagia characterized by slow/weak lingual motion with prolonged bolus collection, manipulation and anterior-posterior transport. Patient demonstrates a significantly delayed swallow trigger dependent on max verbal/tactile cues (i.e. laryngeal massage) from clinician. There is bolus pooling in the valleculae with repetitive regurgitation from the valleculae into the oral cavity prior to triggering the swallow. There is trace laryngeal penetration during the swallow with full retrieval for puree, honey-thick and nectar-thick liquids. There is inconsistent deep laryngeal penetration before and during the swallow without full retrieval for thin liquids. Given patient dependency on clinician cues to initiate a swallow trigger, oral nutrition/hydration/medication is deemed contraindicated at this time due to high risk for choking and/or aspiration with PO intake.

## 2017-12-29 NOTE — DISCHARGE NOTE ADULT - ADDITIONAL INSTRUCTIONS
PT - rehab   NPO with Jevity 1.2 (total 1512 mL at 24 hours, start 20 mL/hour - increase 15/mL per 4 hours to goal of 63 mL/hour) PT - rehab   NPO with Jevity 1.2 (total 1512 mL at 24 hours, start 20 mL/hour - increase 15/mL per 4 hours to goal of 63 mL/hour), free water via  mL/8 hours

## 2017-12-29 NOTE — PROGRESS NOTE ADULT - SUBJECTIVE AND OBJECTIVE BOX
Subjective:Interval History - No events overnight  Patient reports doing good. States he lives alone and does not drive. Performs IADL.  More responsive and attentive today.    Objective:   Vital Signs Last 24 Hrs  T(C): 37.1 (29 Dec 2017 03:34), Max: 37.8 (28 Dec 2017 23:28)  T(F): 98.7 (29 Dec 2017 03:34), Max: 100.1 (28 Dec 2017 23:28)  HR: 65 (29 Dec 2017 03:34) (65 - 70)  BP: 114/58 (29 Dec 2017 04:01) (102/86 - 143/73)  RR: 18 (29 Dec 2017 03:34) (18 - 18)  SpO2: 96% (29 Dec 2017 03:34) (96% - 99%)    General Exam:   General appearance: No acute distress                   Neurological Exam:  Mental Status: Orientated to self and place. Knows Sorin just passed however when asked what holiday is coming up, he states "Thanksgiving, december, Christmas, october".  Attention intact.  No dysarthria, aphasia or neglect.     Cranial Nerves: PERRL, EOMI, no nystagmus or diplopia.  Left nasolabial flattening. Hearing intact.  Tongue midline.      Motor:   Tone: normal.                  Strength: LUE strength improving, 3/5. LLE iliopsoas 2/5 but limited by pain.  Tremor: No resting, postural or action tremor.    Other:    12-28    136  |  100  |  13  ----------------------------<  182<H>  3.3<L>   |  22  |  0.82    Ca    8.1<L>      28 Dec 2017 09:06  Mg     2.0     12-28               11.6   14.42 )-----------( 490      ( 28 Dec 2017 09:12 )             34.9     MEDICATIONS  (STANDING):  acyclovir IVPB      acyclovir IVPB 700 milliGRAM(s) IV Intermittent every 12 hours  aspirin enteric coated 81 milliGRAM(s) Oral daily  atorvastatin 80 milliGRAM(s) Oral at bedtime  cefTRIAXone   IVPB 2 Gram(s) IV Intermittent every 12 hours  cefTRIAXone   IVPB      fosphenytoin IVPB 125 milliGRAM(s) PE IV Intermittent every 8 hours  heparin  Injectable 5000 Unit(s) SubCutaneous every 12 hours  lacosamide IVPB 200 milliGRAM(s) IV Intermittent every 12 hours  levETIRAcetam  IVPB 1500 milliGRAM(s) IV Intermittent every 12 hours  lidocaine 1% Injectable 20 milliLiter(s) Local Injection once  multivitamin 1 Tablet(s) Oral daily  sodium chloride 0.9% lock flush 3 milliLiter(s) IV Push every 8 hours    MEDICATIONS  (PRN):  acetaminophen  Suppository 650 milliGRAM(s) Rectal every 6 hours PRN For Temp greater than 38 C (100.4 F)  sodium chloride 0.65% Nasal 1 Spray(s) Both Nostrils four times a day PRN Congestion

## 2017-12-29 NOTE — DISCHARGE NOTE ADULT - CARE PLAN
Principal Discharge DX:	Seizure  Goal:	Prevention of re-occurrence of seizures  Assessment and plan of treatment:	You presented with seizure. You were monitored and no further seizures were noted on EEG. As per Neurology, you are recommended to continue with Keppra. Follow up at Neurology Clinic at [phone] for further management.  Secondary Diagnosis:	Dementia without behavioral disturbance, unspecified dementia type  Assessment and plan of treatment:	Stable. Continue with supportive care. Follow up PCP within 1 week for further management.  Secondary Diagnosis:	Dysphagia, unspecified type  Assessment and plan of treatment:	You were found to have dysphagia. You were evaluated and recommended for PEG placement. You were recommended by Speech & Swallow for no oral intake and to continue PEG tube feeds as recommended. Follow up outpatient for repeat Speech & Swallow as needed for evaluation for possible oral intake. Follow up PCP within 1 week for further management.  Secondary Diagnosis:	Essential hypertension  Assessment and plan of treatment:	Continue blood pressure medication regimen as directed. Monitor for any visual changes, headaches or dizziness.  Monitor blood pressure regularly.  Follow up with your PCP for further management for high blood pressure.  Secondary Diagnosis:	Fever  Assessment and plan of treatment:	You were found to have fever. You had a workup and seen by Infectious Disease and no etiology was noted. Stable. Follow up PCP within 1 week for further management. Monitor for fevers, chills, shortness of breath, chest pain, abdominal pain.  Secondary Diagnosis:	Transaminitis  Assessment and plan of treatment:	You were found to have elevated liver enzymes. You were seen by Gastroenterology and recommended that this may secondary to seizure medications. Your Vimpat and Dilantin were stopped. Your liver enzymes have stable. Follow up outpatient with Gastroenterology Clinic at [phone] within 1 week for further management. Principal Discharge DX:	Seizure  Goal:	Prevention of re-occurrence of seizures  Assessment and plan of treatment:	You presented with seizure. You were monitored and no further seizures were noted on EEG. As per Neurology, you are recommended to continue with Keppra. Follow up at Neurology Clinic at [phone] for further management.  Secondary Diagnosis:	Dementia without behavioral disturbance, unspecified dementia type  Assessment and plan of treatment:	Stable. Continue with supportive care. Follow up PCP within 1 week for further management.  Secondary Diagnosis:	Dysphagia, unspecified type  Assessment and plan of treatment:	You were found to have dysphagia. You were evaluated and recommended for PEG placement. You were recommended by Speech & Swallow for no oral intake and to continue PEG tube feeds as recommended. Follow up outpatient for repeat Speech & Swallow as needed for evaluation for possible oral intake. Follow up PCP within 1 week for further management.  Secondary Diagnosis:	Essential hypertension  Assessment and plan of treatment:	Continue blood pressure medication regimen as directed. Monitor for any visual changes, headaches or dizziness.  Monitor blood pressure regularly.  Follow up with your PCP for further management for high blood pressure.  Secondary Diagnosis:	Fever  Assessment and plan of treatment:	You were found to have fever. You had a workup and seen by Infectious Disease and no etiology was noted. Stable. Follow up PCP within 1 week for further management. Monitor for fevers, chills, shortness of breath, chest pain, abdominal pain.  Secondary Diagnosis:	Transaminitis  Assessment and plan of treatment:	You were found to have elevated liver enzymes. You were seen by Gastroenterology and recommended that this may secondary to seizure medications. Your Vimpat and Dilantin were stopped. Your liver enzymes have stable. Follow up outpatient with Gastroenterology Clinic at [phone] within 1 week for further management and evaluate possible need for biopsy. Monitor your liver enzymes at your rehab center to evaluate for further management. Principal Discharge DX:	Seizure  Goal:	Prevention of re-occurrence of seizures  Assessment and plan of treatment:	You presented with seizure. You were monitored and no further seizures were noted on EEG. As per Neurology, you are recommended to continue with Keppra. Follow up at Neurology Clinic at [phone] for further management.  Secondary Diagnosis:	Dementia without behavioral disturbance, unspecified dementia type  Assessment and plan of treatment:	Stable. Continue with supportive care. Follow up PCP within 1 week for further management.  Secondary Diagnosis:	Dysphagia, unspecified type  Assessment and plan of treatment:	You were found to have dysphagia. You were evaluated and recommended for PEG placement. You were recommended by Speech & Swallow for no oral intake and to continue PEG tube feeds as recommended. Follow up outpatient for repeat Speech & Swallow as needed for evaluation for possible oral intake under the pretense that you understand the risks and benefits. Follow up PCP within 1 week for further management.  Secondary Diagnosis:	Essential hypertension  Assessment and plan of treatment:	Continue blood pressure medication regimen as directed. Monitor for any visual changes, headaches or dizziness.  Monitor blood pressure regularly.  Follow up with your PCP for further management for high blood pressure.  Secondary Diagnosis:	Fever  Assessment and plan of treatment:	You were found to have fever. You had a workup and seen by Infectious Disease and no etiology was noted. Stable. Follow up PCP within 1 week for further management. Monitor for fevers, chills, shortness of breath, chest pain, abdominal pain.  Secondary Diagnosis:	Transaminitis  Assessment and plan of treatment:	You were found to have elevated liver enzymes. You were seen by Gastroenterology and recommended that this may secondary to seizure medications. Your Vimpat and Dilantin were stopped. Your liver enzymes have stable. Follow up outpatient with Gastroenterology Clinic at [phone] within 1 week for further management and evaluate possible need for biopsy. Monitor your liver enzymes at your rehab center to evaluate for further management.

## 2017-12-29 NOTE — PROGRESS NOTE ADULT - ASSESSMENT
77 year old male with Dementia (unknown baseline) presented with seizures.     Patient unable to provide any history at this time.     The patient was apparently seizing while shoveling snow and had EMS called by a concerned neighbor. The patient was found soaking wet and in the ED was found to have 2 seizures and was given 2mg Ativan IV.     In the ED he was initially afebrile, tachycardic, hypertensive to > 200 SBP, leukocytosis 14.62 on presentation. UA neg, CXR clear. CT Head without acute abnormality.     Developed fevers during hospital stay. 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    MRI with Abnormal signal involves the right cingulate gyrus-medial frontal parietal cortex as described. Some considerations include evolving   subacute ischemia, postictal cortical edema, or encephalitis.    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 in the setting of right frontal seizure activity   -Leukocytosis  -Mental status improving    Recommend:  -Continue ceftriaxone/ acyclovir.  -LP if family agrees  -F/U neurology  -F/U bld cxs.  -IVF with acyclovir  -Trend WBC.  -Continue to monitor temperature curve.

## 2017-12-29 NOTE — DISCHARGE NOTE ADULT - PATIENT PORTAL LINK FT
“You can access the Follow[employer] Patient Portal, offered by [facility], by registering with the following website: [email or web address]”

## 2017-12-29 NOTE — DISCHARGE NOTE ADULT - SECONDARY DIAGNOSIS.
Dementia without behavioral disturbance, unspecified dementia type Dysphagia, unspecified type Essential hypertension Fever Transaminitis

## 2017-12-29 NOTE — SWALLOW VFSS/MBS ASSESSMENT ADULT - RECOMMENDED CONSISTENCY
1) Consider non-oral nutrition/hydration/medication; Defer to MD to discuss long term nutritional goals of care.   2) Aspiration Precautions  3) Meticulous oral care to minimize risk for oral pathogens and associated aspiration pneumonia  4) SLP follow up deemed not warranted at this time; MD may reconsult if deemed appropriate.

## 2017-12-29 NOTE — DISCHARGE NOTE ADULT - COMMUNITY RESOURCES
[facility], located [address] [place] [address]. tele# [phone], [facility], will transport patient at 6pm, trip # [phone].

## 2017-12-29 NOTE — SWALLOW VFSS/MBS ASSESSMENT ADULT - PHARYNGEAL PHASE COMMENTS
Severely delayed initiation of the pharyngeal swallows; Decreased base of tongue retraction; Reduced laryngeal elevation; Adequate pharyngeal constriction. Dependence on verbal/tactile cues from clinician to elicit a severely delayed swallow trigger (reaching 12+ seconds) with bolus pooling in the valleculae; Repetitive regurgitation from the valleculae into the oral cavity; Decreased base of tongue retraction with mild residue in the valleculae post swallow; Reduced laryngeal elevation; Decreased pharyngeal constriction with trace residue in the pyriform sinus and along the posterior pharyngeal wall post swallow. Dependence on verbal/tactile cues from clinician to elicit a severely delayed swallow trigger (reaching 12+ seconds) with bolus pooling in the valleculae and inconsistently migrating into the laryngeal vestibule; Repetitive regurgitation from the valleculae into the oral cavity; Decreased base of tongue retraction with mild residue in the valleculae post swallow; Reduced laryngeal elevation; Decreased pharyngeal constriction with trace residue in the pyriform sinus and along the posterior pharyngeal wall post swallow.

## 2017-12-29 NOTE — DISCHARGE NOTE ADULT - PLAN OF CARE
Prevention of re-occurrence of seizures You presented with seizure. You were monitored and no further seizures were noted on EEG. As per Neurology, you are recommended to continue with Keppra. Follow up at Neurology Clinic at [phone] for further management. Stable. Continue with supportive care. Follow up PCP within 1 week for further management. You were found to have dysphagia. You were evaluated and recommended for PEG placement. You were recommended by Speech & Swallow for no oral intake and to continue PEG tube feeds as recommended. Follow up outpatient for repeat Speech & Swallow as needed for evaluation for possible oral intake. Follow up PCP within 1 week for further management. Continue blood pressure medication regimen as directed. Monitor for any visual changes, headaches or dizziness.  Monitor blood pressure regularly.  Follow up with your PCP for further management for high blood pressure. You were found to have fever. You had a workup and seen by Infectious Disease and no etiology was noted. Stable. Follow up PCP within 1 week for further management. Monitor for fevers, chills, shortness of breath, chest pain, abdominal pain. You were found to have elevated liver enzymes. You were seen by Gastroenterology and recommended that this may secondary to seizure medications. Your Vimpat and Dilantin were stopped. Your liver enzymes have stable. Follow up outpatient with Gastroenterology Clinic at [phone] within 1 week for further management. You were found to have elevated liver enzymes. You were seen by Gastroenterology and recommended that this may secondary to seizure medications. Your Vimpat and Dilantin were stopped. Your liver enzymes have stable. Follow up outpatient with Gastroenterology Clinic at [phone] within 1 week for further management and evaluate possible need for biopsy. Monitor your liver enzymes at your rehab center to evaluate for further management. You were found to have dysphagia. You were evaluated and recommended for PEG placement. You were recommended by Speech & Swallow for no oral intake and to continue PEG tube feeds as recommended. Follow up outpatient for repeat Speech & Swallow as needed for evaluation for possible oral intake under the pretense that you understand the risks and benefits. Follow up PCP within 1 week for further management.

## 2017-12-29 NOTE — PROGRESS NOTE ADULT - PROBLEM SELECTOR PLAN 4
-pt with significant oropharyngeal dysphagia and laryngeal penetration on cineesophagram  -d/c dysphagia diet, maintain NPO except for meds  -pt with severe protein calorie malnutrition, consider GI consult for PEG  -IVF D5NS + 20 meq KCl, consider GI consult, and palliative care consult next week -pt with significant oropharyngeal dysphagia and laryngeal penetration on cineesophagram  -d/c dysphagia diet, maintain NPO except for meds  -pt with severe protein calorie malnutrition, will speak to pt's daughter about possible GI consult for PEG  -IVF D5NS + 20 meq KCl, consider palliative care consult next week -pt with significant oropharyngeal dysphagia and laryngeal penetration on cineesophagram  -d/c dysphagia diet, maintain NPO except for meds  -pt with severe protein calorie malnutrition. I spoke to patient's daughter about GI consult for PEG evaluation; she is agreeable with the consult and understands that PEG doesn't protect against aspiration  -IVF D5NS + 20 meq KCl, consider palliative care consult next week

## 2017-12-30 DIAGNOSIS — R74.0 NONSPECIFIC ELEVATION OF LEVELS OF TRANSAMINASE AND LACTIC ACID DEHYDROGENASE [LDH]: ICD-10-CM

## 2017-12-30 LAB
ALBUMIN SERPL ELPH-MCNC: 2.5 G/DL — LOW (ref 3.3–5)
ALP SERPL-CCNC: 278 U/L — HIGH (ref 40–120)
ALT FLD-CCNC: 57 U/L — HIGH (ref 4–41)
AST SERPL-CCNC: 72 U/L — HIGH (ref 4–40)
BASOPHILS # BLD AUTO: 0.09 K/UL — SIGNIFICANT CHANGE UP (ref 0–0.2)
BASOPHILS NFR BLD AUTO: 0.7 % — SIGNIFICANT CHANGE UP (ref 0–2)
BILIRUB SERPL-MCNC: 0.4 MG/DL — SIGNIFICANT CHANGE UP (ref 0.2–1.2)
BUN SERPL-MCNC: 14 MG/DL — SIGNIFICANT CHANGE UP (ref 7–23)
CALCIUM SERPL-MCNC: 8.4 MG/DL — SIGNIFICANT CHANGE UP (ref 8.4–10.5)
CHLORIDE SERPL-SCNC: 104 MMOL/L — SIGNIFICANT CHANGE UP (ref 98–107)
CO2 SERPL-SCNC: 22 MMOL/L — SIGNIFICANT CHANGE UP (ref 22–31)
CREAT SERPL-MCNC: 0.82 MG/DL — SIGNIFICANT CHANGE UP (ref 0.5–1.3)
EOSINOPHIL # BLD AUTO: 0.31 K/UL — SIGNIFICANT CHANGE UP (ref 0–0.5)
EOSINOPHIL NFR BLD AUTO: 2.3 % — SIGNIFICANT CHANGE UP (ref 0–6)
GLUCOSE SERPL-MCNC: 132 MG/DL — HIGH (ref 70–99)
HCT VFR BLD CALC: 34.2 % — LOW (ref 39–50)
HGB BLD-MCNC: 11.2 G/DL — LOW (ref 13–17)
IMM GRANULOCYTES # BLD AUTO: 0.52 # — SIGNIFICANT CHANGE UP
IMM GRANULOCYTES NFR BLD AUTO: 3.9 % — HIGH (ref 0–1.5)
LYMPHOCYTES # BLD AUTO: 1.43 K/UL — SIGNIFICANT CHANGE UP (ref 1–3.3)
LYMPHOCYTES # BLD AUTO: 10.8 % — LOW (ref 13–44)
MCHC RBC-ENTMCNC: 28.7 PG — SIGNIFICANT CHANGE UP (ref 27–34)
MCHC RBC-ENTMCNC: 32.7 % — SIGNIFICANT CHANGE UP (ref 32–36)
MCV RBC AUTO: 87.7 FL — SIGNIFICANT CHANGE UP (ref 80–100)
MONOCYTES # BLD AUTO: 0.79 K/UL — SIGNIFICANT CHANGE UP (ref 0–0.9)
MONOCYTES NFR BLD AUTO: 5.9 % — SIGNIFICANT CHANGE UP (ref 2–14)
NEUTROPHILS # BLD AUTO: 10.16 K/UL — HIGH (ref 1.8–7.4)
NEUTROPHILS NFR BLD AUTO: 76.4 % — SIGNIFICANT CHANGE UP (ref 43–77)
NRBC # FLD: 0 — SIGNIFICANT CHANGE UP
PLATELET # BLD AUTO: 660 K/UL — HIGH (ref 150–400)
PMV BLD: 8.5 FL — SIGNIFICANT CHANGE UP (ref 7–13)
POTASSIUM SERPL-MCNC: 3.8 MMOL/L — SIGNIFICANT CHANGE UP (ref 3.5–5.3)
POTASSIUM SERPL-SCNC: 3.8 MMOL/L — SIGNIFICANT CHANGE UP (ref 3.5–5.3)
PROT SERPL-MCNC: 6.7 G/DL — SIGNIFICANT CHANGE UP (ref 6–8.3)
RBC # BLD: 3.9 M/UL — LOW (ref 4.2–5.8)
RBC # FLD: 14.2 % — SIGNIFICANT CHANGE UP (ref 10.3–14.5)
SODIUM SERPL-SCNC: 138 MMOL/L — SIGNIFICANT CHANGE UP (ref 135–145)
WBC # BLD: 13.3 K/UL — HIGH (ref 3.8–10.5)
WBC # FLD AUTO: 13.3 K/UL — HIGH (ref 3.8–10.5)

## 2017-12-30 PROCEDURE — 99233 SBSQ HOSP IP/OBS HIGH 50: CPT

## 2017-12-30 RX ADMIN — HEPARIN SODIUM 5000 UNIT(S): 5000 INJECTION INTRAVENOUS; SUBCUTANEOUS at 17:44

## 2017-12-30 RX ADMIN — FOSPHENYTOIN 105 MILLIGRAM(S) PE: 50 INJECTION INTRAMUSCULAR; INTRAVENOUS at 22:43

## 2017-12-30 RX ADMIN — HEPARIN SODIUM 5000 UNIT(S): 5000 INJECTION INTRAVENOUS; SUBCUTANEOUS at 05:08

## 2017-12-30 RX ADMIN — Medication 1 TABLET(S): at 14:43

## 2017-12-30 RX ADMIN — LACOSAMIDE 140 MILLIGRAM(S): 50 TABLET ORAL at 06:29

## 2017-12-30 RX ADMIN — SODIUM CHLORIDE 3 MILLILITER(S): 9 INJECTION INTRAMUSCULAR; INTRAVENOUS; SUBCUTANEOUS at 21:56

## 2017-12-30 RX ADMIN — Medication 81 MILLIGRAM(S): at 14:43

## 2017-12-30 RX ADMIN — Medication 264 MILLIGRAM(S): at 07:06

## 2017-12-30 RX ADMIN — SODIUM CHLORIDE 3 MILLILITER(S): 9 INJECTION INTRAMUSCULAR; INTRAVENOUS; SUBCUTANEOUS at 05:07

## 2017-12-30 RX ADMIN — SODIUM CHLORIDE 3 MILLILITER(S): 9 INJECTION INTRAMUSCULAR; INTRAVENOUS; SUBCUTANEOUS at 14:33

## 2017-12-30 RX ADMIN — FOSPHENYTOIN 105 MILLIGRAM(S) PE: 50 INJECTION INTRAMUSCULAR; INTRAVENOUS at 16:21

## 2017-12-30 RX ADMIN — CEFTRIAXONE 100 GRAM(S): 500 INJECTION, POWDER, FOR SOLUTION INTRAMUSCULAR; INTRAVENOUS at 17:43

## 2017-12-30 RX ADMIN — LEVETIRACETAM 400 MILLIGRAM(S): 250 TABLET, FILM COATED ORAL at 20:16

## 2017-12-30 RX ADMIN — Medication 264 MILLIGRAM(S): at 17:42

## 2017-12-30 RX ADMIN — LEVETIRACETAM 400 MILLIGRAM(S): 250 TABLET, FILM COATED ORAL at 08:18

## 2017-12-30 RX ADMIN — LACOSAMIDE 140 MILLIGRAM(S): 50 TABLET ORAL at 17:44

## 2017-12-30 RX ADMIN — FOSPHENYTOIN 105 MILLIGRAM(S) PE: 50 INJECTION INTRAMUSCULAR; INTRAVENOUS at 05:01

## 2017-12-30 RX ADMIN — CEFTRIAXONE 100 GRAM(S): 500 INJECTION, POWDER, FOR SOLUTION INTRAMUSCULAR; INTRAVENOUS at 05:45

## 2017-12-30 RX ADMIN — ATORVASTATIN CALCIUM 80 MILLIGRAM(S): 80 TABLET, FILM COATED ORAL at 21:57

## 2017-12-30 NOTE — PROGRESS NOTE ADULT - PROBLEM SELECTOR PLAN 4
-pt with significant oropharyngeal dysphagia and laryngeal penetration on cineesophagram  -d/c dysphagia diet, maintain NPO except for meds  -pt with severe protein calorie malnutrition. I spoke to patient's daughter about GI consult for PEG evaluation; she is agreeable with the consult and understands that PEG doesn't protect against aspiration  -IVF D5NS + 20 meq KCl, consider palliative care consult next week -pt with significant oropharyngeal dysphagia and laryngeal penetration on cineesophagram  -d/c dysphagia diet, maintain NPO except for meds  -pt with severe protein calorie malnutrition. I spoke to patient's daughter about GI consult for PEG evaluation; she is agreeable with the consult and understands that PEG doesn't protect against aspiration  -IVF D5NS + 20 meq KCl, team to call palliative care consult next week

## 2017-12-30 NOTE — PROGRESS NOTE ADULT - PROBLEM SELECTOR PLAN 7
DVT ppx: HSQ  fall/aspiration/seizure precautions Rising transaminitis noted on labs today.  Abdominal exam is benign.  Possible adverse effect from medications i.e. new high intensity statin vs Lacosamide (rare to cause transaminitis).  Will continue to monitor daily CMP.

## 2017-12-30 NOTE — PROGRESS NOTE ADULT - SUBJECTIVE AND OBJECTIVE BOX
Patient is a 77y old  Male who presents with a chief complaint of Seizure x 1 day (16 Dec 2017 01:26)      SUBJECTIVE / OVERNIGHT EVENTS:      MEDICATIONS  (STANDING):  acyclovir IVPB      acyclovir IVPB 700 milliGRAM(s) IV Intermittent every 12 hours  aspirin enteric coated 81 milliGRAM(s) Oral daily  atorvastatin 80 milliGRAM(s) Oral at bedtime  cefTRIAXone   IVPB 2 Gram(s) IV Intermittent every 12 hours  cefTRIAXone   IVPB      dextrose 5% + sodium chloride 0.9% with potassium chloride 20 mEq/L 1000 milliLiter(s) (50 mL/Hr) IV Continuous <Continuous>  fosphenytoin IVPB 125 milliGRAM(s) PE IV Intermittent every 8 hours  heparin  Injectable 5000 Unit(s) SubCutaneous every 12 hours  lacosamide IVPB 200 milliGRAM(s) IV Intermittent every 12 hours  levETIRAcetam  IVPB 1500 milliGRAM(s) IV Intermittent every 12 hours  lidocaine 1% Injectable 20 milliLiter(s) Local Injection once  multivitamin 1 Tablet(s) Oral daily  sodium chloride 0.9% lock flush 3 milliLiter(s) IV Push every 8 hours    MEDICATIONS  (PRN):  acetaminophen  Suppository 650 milliGRAM(s) Rectal every 6 hours PRN For Temp greater than 38 C (100.4 F)  sodium chloride 0.65% Nasal 1 Spray(s) Both Nostrils four times a day PRN Congestion      Vital Signs Last 24 Hrs  T(C): 37.3 (30 Dec 2017 06:35), Max: 37.3 (30 Dec 2017 06:35)  T(F): 99.1 (30 Dec 2017 06:35), Max: 99.1 (30 Dec 2017 06:35)  HR: 68 (30 Dec 2017 06:35) (68 - 74)  BP: 151/86 (30 Dec 2017 06:35) (136/68 - 175/86)  BP(mean): --  RR: 18 (30 Dec 2017 06:35) (17 - 18)  SpO2: 96% (30 Dec 2017 06:35) (96% - 100%)  CAPILLARY BLOOD GLUCOSE        I&O's Summary        PHYSICAL EXAM  GENERAL: NAD, well-developed  HEAD:  Atraumatic, Normocephalic  EYES: EOMI, PERRLA, conjunctiva and sclera clear  NECK: Supple, No JVD  CHEST/LUNG: Clear to auscultation bilaterally; No wheeze  HEART: Regular rate and rhythm; No murmurs, rubs, or gallops  ABDOMEN: Soft, Nontender, Nondistended; Bowel sounds present  EXTREMITIES:  2+ Peripheral Pulses, No clubbing, cyanosis, or edema  PSYCH: AAOx3  SKIN: No rashes or lesions    LABS:                        11.2   13.30 )-----------( 660      ( 30 Dec 2017 05:34 )             34.2     12-30    138  |  104  |  14  ----------------------------<  132<H>  3.8   |  22  |  0.82    Ca    8.4      30 Dec 2017 05:34    TPro  6.7  /  Alb  2.5<L>  /  TBili  0.4  /  DBili  x   /  AST  72<H>  /  ALT  57<H>  /  AlkPhos  278<H>  12-30              RADIOLOGY & ADDITIONAL TESTS:    Imaging Personally Reviewed:  Consultant(s) Notes Reviewed:    Care Discussed with Consultants/Other Providers: Patient is a 77y old  Male who presents with a chief complaint of Seizure x 1 day (16 Dec 2017 01:26)      SUBJECTIVE / OVERNIGHT EVENTS: No major overnight events.  Pt states he feels well today, awake and alert however remains disoriented and confused.  Believes he's in [place].  Remains afebrile and seizure free.        MEDICATIONS  (STANDING):  acyclovir IVPB      acyclovir IVPB 700 milliGRAM(s) IV Intermittent every 12 hours  aspirin enteric coated 81 milliGRAM(s) Oral daily  atorvastatin 80 milliGRAM(s) Oral at bedtime  cefTRIAXone   IVPB 2 Gram(s) IV Intermittent every 12 hours  cefTRIAXone   IVPB      dextrose 5% + sodium chloride 0.9% with potassium chloride 20 mEq/L 1000 milliLiter(s) (50 mL/Hr) IV Continuous <Continuous>  fosphenytoin IVPB 125 milliGRAM(s) PE IV Intermittent every 8 hours  heparin  Injectable 5000 Unit(s) SubCutaneous every 12 hours  lacosamide IVPB 200 milliGRAM(s) IV Intermittent every 12 hours  levETIRAcetam  IVPB 1500 milliGRAM(s) IV Intermittent every 12 hours  lidocaine 1% Injectable 20 milliLiter(s) Local Injection once  multivitamin 1 Tablet(s) Oral daily  sodium chloride 0.9% lock flush 3 milliLiter(s) IV Push every 8 hours    MEDICATIONS  (PRN):  acetaminophen  Suppository 650 milliGRAM(s) Rectal every 6 hours PRN For Temp greater than 38 C (100.4 F)  sodium chloride 0.65% Nasal 1 Spray(s) Both Nostrils four times a day PRN Congestion      Vital Signs Last 24 Hrs  T(C): 37.3 (30 Dec 2017 06:35), Max: 37.3 (30 Dec 2017 06:35)  T(F): 99.1 (30 Dec 2017 06:35), Max: 99.1 (30 Dec 2017 06:35)  HR: 68 (30 Dec 2017 06:35) (68 - 74)  BP: 151/86 (30 Dec 2017 06:35) (136/68 - 175/86)  BP(mean): --  RR: 18 (30 Dec 2017 06:35) (17 - 18)  SpO2: 96% (30 Dec 2017 06:35) (96% - 100%)  CAPILLARY BLOOD GLUCOSE      PHYSICAL EXAM  GENERAL: NAD, well-developed  HEAD:  Atraumatic, Normocephalic  EYES: EOMI, PERRLA, conjunctiva and sclera clear  NECK: Supple, No JVD  CHEST/LUNG: Clear to auscultation bilaterally on anterior lung exam.   HEART: Regular rate and rhythm; No murmurs, rubs, or gallops  ABDOMEN: Soft, Nontender, Nondistended; Bowel sounds present  EXTREMITIES:  No clubbing, cyanosis, or edema, contracted   PSYCH: AAOx1      LABS:                        11.2   13.30 )-----------( 660      ( 30 Dec 2017 05:34 )             34.2     12-30    138  |  104  |  14  ----------------------------<  132<H>  3.8   |  22  |  0.82    Ca    8.4      30 Dec 2017 05:34    TPro  6.7  /  Alb  2.5<L>  /  TBili  0.4  /  DBili  x   /  AST  72<H>  /  ALT  57<H>  /  AlkPhos  278<H>  12-30        Consultant(s) Notes Reviewed:  Neuro

## 2017-12-30 NOTE — PROGRESS NOTE ADULT - ASSESSMENT
Status epilepticus appears to be much improved and no clinical seizures for nearly one week.  Brother has been estranged for the past year but is aware of significant cognitive decline over that time. He feels that the patient is at his baseline cognitively.  Maintain current AEDs and suggest PT.  Will likely need rehab and SNF.

## 2017-12-30 NOTE — PROGRESS NOTE ADULT - PROBLEM SELECTOR PLAN 2
- So far all cultures have been negative  - Concern for underlying viral illness vs HSV encephalitis  -Hold off LP as above, daughter in law Chelita [phone] would not consent to procedure without sedation  - c/w ceftriaxone and acyclovir, f/u ID, clarify duration of abx  - serologies negative, cryptococcal antigen and HIV negative - So far all cultures have been negative  - Concern for underlying viral illness vs HSV encephalitis  -Hold off LP as above, daughter in law Chelita [phone] would not consent to procedure without sedation  - c/w ceftriaxone and acyclovir, f/u ID, recommend 10 day total sof abx.  Continue with IVF while on IV acyclovir.   - serologies negative, cryptococcal antigen and HIV negative

## 2017-12-30 NOTE — PROGRESS NOTE ADULT - PROBLEM SELECTOR PLAN 1
- now controlled, no recurrence of seizures.  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 No epileptic pattern or seizure seen.  - MRI brain (12/26) abnormal signal involves the right cingulate gyrus-medial frontal parietal cortex as described. Some considerations include evolving   subacute ischemia, postictal cortical edema, or encephalitis.  - c/w current regimen of AEDs (fosphenytoin, keppra, vimpat), f/u dilantin level  -c/w ASA 81 mg and lipitor 80mg for possible subacute CVA  -no plan for LP as daughter in law Chelita would not consent the procedure without sedation, wants it done under general anesthesia, risk >benefit, low suspicion for HSV encephalitis, hold off on LP at this time

## 2017-12-30 NOTE — PROGRESS NOTE ADULT - SUBJECTIVE AND OBJECTIVE BOX
Subjective:Interval History - No events overnight    Objective:   Vital Signs Last 24 Hrs  T(C): 37.3 (30 Dec 2017 06:35), Max: 37.3 (30 Dec 2017 06:35)  T(F): 99.1 (30 Dec 2017 06:35), Max: 99.1 (30 Dec 2017 06:35)  HR: 68 (30 Dec 2017 06:35) (68 - 71)  BP: 151/86 (30 Dec 2017 06:35) (151/86 - 175/86)  BP(mean): --  RR: 18 (30 Dec 2017 06:35) (18 - 18)  SpO2: 96% (30 Dec 2017 06:35) (96% - 100%)    General Exam:   General appearance: No acute distress                   Neurological Exam:  Mental Status:  Much more awake and alert.  Discussing family issues with his brother. Disoriented to time, place and situation.  Much more attentive and interactive.     Cranial Nerves: CN I - not tested.  PERRL, EOMI, VFF, no nystagmus or diplopia.  No APD.  Fundi not visualized bilaterally.  CN V1-3 intact to light touch and pinprick. decreased left NLF.   Hearing intact to finger rub bilaterally.  Tongue, uvula and palate midline.  Sternocleidomastoid and Trapezius intact bilaterally.    Motor:   Tone: increased tone on the LUE and LE's.                 Strength: Left UE weakness is improving                 Tremor: No resting, postural or action tremor.  No myoclonus.         Other:    12-30    138  |  104  |  14  ----------------------------<  132<H>  3.8   |  22  |  0.82    Ca    8.4      30 Dec 2017 05:34    TPro  6.7  /  Alb  2.5<L>  /  TBili  0.4  /  DBili  x   /  AST  72<H>  /  ALT  57<H>  /  AlkPhos  278<H>  12-30    LIVER FUNCTIONS - ( 30 Dec 2017 05:34 )  Alb: 2.5 g/dL / Pro: 6.7 g/dL / ALK PHOS: 278 u/L / ALT: 57 u/L / AST: 72 u/L / GGT: x                                 11.2   13.30 )-----------( 660      ( 30 Dec 2017 05:34 )             34.2     Radiology        MEDICATIONS  (STANDING):  acyclovir IVPB      acyclovir IVPB 700 milliGRAM(s) IV Intermittent every 12 hours  aspirin enteric coated 81 milliGRAM(s) Oral daily  atorvastatin 80 milliGRAM(s) Oral at bedtime  cefTRIAXone   IVPB 2 Gram(s) IV Intermittent every 12 hours  cefTRIAXone   IVPB      dextrose 5% + sodium chloride 0.9% with potassium chloride 20 mEq/L 1000 milliLiter(s) (50 mL/Hr) IV Continuous <Continuous>  fosphenytoin IVPB 125 milliGRAM(s) PE IV Intermittent every 8 hours  heparin  Injectable 5000 Unit(s) SubCutaneous every 12 hours  lacosamide IVPB 200 milliGRAM(s) IV Intermittent every 12 hours  levETIRAcetam  IVPB 1500 milliGRAM(s) IV Intermittent every 12 hours  lidocaine 1% Injectable 20 milliLiter(s) Local Injection once  multivitamin 1 Tablet(s) Oral daily  sodium chloride 0.9% lock flush 3 milliLiter(s) IV Push every 8 hours    MEDICATIONS  (PRN):  acetaminophen  Suppository 650 milliGRAM(s) Rectal every 6 hours PRN For Temp greater than 38 C (100.4 F)  sodium chloride 0.65% Nasal 1 Spray(s) Both Nostrils four times a day PRN Congestion

## 2017-12-31 DIAGNOSIS — I16.0 HYPERTENSIVE URGENCY: ICD-10-CM

## 2017-12-31 LAB
BASOPHILS # BLD AUTO: 0.11 K/UL — SIGNIFICANT CHANGE UP (ref 0–0.2)
BASOPHILS NFR BLD AUTO: 0.8 % — SIGNIFICANT CHANGE UP (ref 0–2)
BASOPHILS NFR SPEC: 1.8 % — SIGNIFICANT CHANGE UP (ref 0–2)
BUN SERPL-MCNC: 12 MG/DL — SIGNIFICANT CHANGE UP (ref 7–23)
CALCIUM SERPL-MCNC: 8.5 MG/DL — SIGNIFICANT CHANGE UP (ref 8.4–10.5)
CHLORIDE SERPL-SCNC: 99 MMOL/L — SIGNIFICANT CHANGE UP (ref 98–107)
CO2 SERPL-SCNC: 22 MMOL/L — SIGNIFICANT CHANGE UP (ref 22–31)
CREAT SERPL-MCNC: 0.77 MG/DL — SIGNIFICANT CHANGE UP (ref 0.5–1.3)
ELLIPTOCYTES BLD QL SMEAR: SLIGHT — SIGNIFICANT CHANGE UP
EOSINOPHIL # BLD AUTO: 0.24 K/UL — SIGNIFICANT CHANGE UP (ref 0–0.5)
EOSINOPHIL NFR BLD AUTO: 1.8 % — SIGNIFICANT CHANGE UP (ref 0–6)
EOSINOPHIL NFR FLD: 0 % — SIGNIFICANT CHANGE UP (ref 0–6)
GIANT PLATELETS BLD QL SMEAR: PRESENT — SIGNIFICANT CHANGE UP
GLUCOSE SERPL-MCNC: 130 MG/DL — HIGH (ref 70–99)
HCT VFR BLD CALC: 36.7 % — LOW (ref 39–50)
HGB BLD-MCNC: 11.9 G/DL — LOW (ref 13–17)
IMM GRANULOCYTES # BLD AUTO: 0.33 # — SIGNIFICANT CHANGE UP
IMM GRANULOCYTES NFR BLD AUTO: 2.5 % — HIGH (ref 0–1.5)
LYMPHOCYTES # BLD AUTO: 1.15 K/UL — SIGNIFICANT CHANGE UP (ref 1–3.3)
LYMPHOCYTES # BLD AUTO: 8.7 % — LOW (ref 13–44)
LYMPHOCYTES NFR SPEC AUTO: 1.7 % — LOW (ref 13–44)
MAGNESIUM SERPL-MCNC: 1.9 MG/DL — SIGNIFICANT CHANGE UP (ref 1.6–2.6)
MCHC RBC-ENTMCNC: 28.5 PG — SIGNIFICANT CHANGE UP (ref 27–34)
MCHC RBC-ENTMCNC: 32.4 % — SIGNIFICANT CHANGE UP (ref 32–36)
MCV RBC AUTO: 88 FL — SIGNIFICANT CHANGE UP (ref 80–100)
METAMYELOCYTES # FLD: 0.9 % — SIGNIFICANT CHANGE UP (ref 0–1)
MONOCYTES # BLD AUTO: 0.76 K/UL — SIGNIFICANT CHANGE UP (ref 0–0.9)
MONOCYTES NFR BLD AUTO: 5.8 % — SIGNIFICANT CHANGE UP (ref 2–14)
MONOCYTES NFR BLD: 1.7 % — LOW (ref 2–9)
NEUTROPHIL AB SER-ACNC: 92.2 % — HIGH (ref 43–77)
NEUTROPHILS # BLD AUTO: 10.56 K/UL — HIGH (ref 1.8–7.4)
NEUTROPHILS NFR BLD AUTO: 80.4 % — HIGH (ref 43–77)
NRBC # FLD: 0 — SIGNIFICANT CHANGE UP
PLATELET # BLD AUTO: 702 K/UL — HIGH (ref 150–400)
PLATELET COUNT - ESTIMATE: SIGNIFICANT CHANGE UP
PMV BLD: 8.5 FL — SIGNIFICANT CHANGE UP (ref 7–13)
POIKILOCYTOSIS BLD QL AUTO: SLIGHT — SIGNIFICANT CHANGE UP
POTASSIUM SERPL-MCNC: 4.2 MMOL/L — SIGNIFICANT CHANGE UP (ref 3.5–5.3)
POTASSIUM SERPL-SCNC: 4.2 MMOL/L — SIGNIFICANT CHANGE UP (ref 3.5–5.3)
RBC # BLD: 4.17 M/UL — LOW (ref 4.2–5.8)
RBC # FLD: 14 % — SIGNIFICANT CHANGE UP (ref 10.3–14.5)
SCHISTOCYTES BLD QL AUTO: SLIGHT — SIGNIFICANT CHANGE UP
SODIUM SERPL-SCNC: 136 MMOL/L — SIGNIFICANT CHANGE UP (ref 135–145)
VARIANT LYMPHS # BLD: 1.7 % — SIGNIFICANT CHANGE UP
WBC # BLD: 13.15 K/UL — HIGH (ref 3.8–10.5)
WBC # FLD AUTO: 13.15 K/UL — HIGH (ref 3.8–10.5)

## 2017-12-31 PROCEDURE — 99233 SBSQ HOSP IP/OBS HIGH 50: CPT

## 2017-12-31 RX ORDER — AMLODIPINE BESYLATE 2.5 MG/1
5 TABLET ORAL DAILY
Qty: 0 | Refills: 0 | Status: DISCONTINUED | OUTPATIENT
Start: 2017-12-31 | End: 2018-01-07

## 2017-12-31 RX ADMIN — FOSPHENYTOIN 105 MILLIGRAM(S) PE: 50 INJECTION INTRAMUSCULAR; INTRAVENOUS at 05:22

## 2017-12-31 RX ADMIN — Medication 264 MILLIGRAM(S): at 17:58

## 2017-12-31 RX ADMIN — HEPARIN SODIUM 5000 UNIT(S): 5000 INJECTION INTRAVENOUS; SUBCUTANEOUS at 05:23

## 2017-12-31 RX ADMIN — SODIUM CHLORIDE 3 MILLILITER(S): 9 INJECTION INTRAMUSCULAR; INTRAVENOUS; SUBCUTANEOUS at 13:13

## 2017-12-31 RX ADMIN — SODIUM CHLORIDE 3 MILLILITER(S): 9 INJECTION INTRAMUSCULAR; INTRAVENOUS; SUBCUTANEOUS at 21:00

## 2017-12-31 RX ADMIN — DEXTROSE MONOHYDRATE, SODIUM CHLORIDE, AND POTASSIUM CHLORIDE 50 MILLILITER(S): 50; .745; 4.5 INJECTION, SOLUTION INTRAVENOUS at 05:22

## 2017-12-31 RX ADMIN — DEXTROSE MONOHYDRATE, SODIUM CHLORIDE, AND POTASSIUM CHLORIDE 50 MILLILITER(S): 50; .745; 4.5 INJECTION, SOLUTION INTRAVENOUS at 13:19

## 2017-12-31 RX ADMIN — CEFTRIAXONE 100 GRAM(S): 500 INJECTION, POWDER, FOR SOLUTION INTRAMUSCULAR; INTRAVENOUS at 05:34

## 2017-12-31 RX ADMIN — FOSPHENYTOIN 105 MILLIGRAM(S) PE: 50 INJECTION INTRAMUSCULAR; INTRAVENOUS at 21:02

## 2017-12-31 RX ADMIN — SODIUM CHLORIDE 3 MILLILITER(S): 9 INJECTION INTRAMUSCULAR; INTRAVENOUS; SUBCUTANEOUS at 05:23

## 2017-12-31 RX ADMIN — Medication 81 MILLIGRAM(S): at 11:33

## 2017-12-31 RX ADMIN — LACOSAMIDE 140 MILLIGRAM(S): 50 TABLET ORAL at 17:12

## 2017-12-31 RX ADMIN — LEVETIRACETAM 400 MILLIGRAM(S): 250 TABLET, FILM COATED ORAL at 09:03

## 2017-12-31 RX ADMIN — LACOSAMIDE 140 MILLIGRAM(S): 50 TABLET ORAL at 05:22

## 2017-12-31 RX ADMIN — HEPARIN SODIUM 5000 UNIT(S): 5000 INJECTION INTRAVENOUS; SUBCUTANEOUS at 17:58

## 2017-12-31 RX ADMIN — LEVETIRACETAM 400 MILLIGRAM(S): 250 TABLET, FILM COATED ORAL at 20:58

## 2017-12-31 RX ADMIN — Medication 264 MILLIGRAM(S): at 05:34

## 2017-12-31 RX ADMIN — FOSPHENYTOIN 105 MILLIGRAM(S) PE: 50 INJECTION INTRAMUSCULAR; INTRAVENOUS at 13:13

## 2017-12-31 RX ADMIN — CEFTRIAXONE 100 GRAM(S): 500 INJECTION, POWDER, FOR SOLUTION INTRAMUSCULAR; INTRAVENOUS at 17:55

## 2017-12-31 RX ADMIN — AMLODIPINE BESYLATE 5 MILLIGRAM(S): 2.5 TABLET ORAL at 13:19

## 2017-12-31 RX ADMIN — ATORVASTATIN CALCIUM 80 MILLIGRAM(S): 80 TABLET, FILM COATED ORAL at 21:02

## 2017-12-31 RX ADMIN — Medication 1 TABLET(S): at 11:33

## 2017-12-31 NOTE — PROGRESS NOTE ADULT - PROBLEM SELECTOR PLAN 5
-pt with significant oropharyngeal dysphagia and laryngeal penetration on cineesophagram  -d/c dysphagia diet, maintain NPO except for meds  -pt with severe protein calorie malnutrition. I spoke to patient's daughter about GI consult for PEG evaluation; she is agreeable with the consult and understands that PEG doesn't protect against aspiration  -IVF D5NS + 20 meq KCl, team to call palliative care consult next week

## 2017-12-31 NOTE — PROGRESS NOTE ADULT - PROBLEM SELECTOR PLAN 3
- So far all cultures have been negative  - Concern for underlying viral illness vs HSV encephalitis  -Hold off LP as above, daughter in law Chelita [phone] would not consent to procedure without sedation  - c/w ceftriaxone and acyclovir, f/u ID, recommend 10 day total sof abx.  Continue with IVF while on IV acyclovir.   - serologies negative, cryptococcal antigen and HIV negative

## 2017-12-31 NOTE — PROGRESS NOTE ADULT - PROBLEM SELECTOR PLAN 8
Rising transaminitis noted on labs yesterday.  Repeat labs today pending, can check in AM tomorrow.  Abdominal exam is benign.  Possible adverse effect from medications i.e. new high intensity statin vs Lacosamide (rare to cause transaminitis).  Will continue to monitor daily CMP.

## 2017-12-31 NOTE — CONSULT NOTE ADULT - SUBJECTIVE AND OBJECTIVE BOX
Chief Complaint:  Patient is a 77y old  Male who presents with a chief complaint of Seizure x 1 day (16 Dec 2017 01:26)      HPI:  76 yo M with dementia (unclear baseline), p/w episode of obtundation with course c/b seizures x 2 in the ED that initially resolved with Ativan. 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 Hospital course now complicated by fever, SIRS, cannot r/o meningitis vs encephalitis all cultures have been negative serologies negative, cryptococcal antigen and HIV negative started on ceftriaxone and acyclovir . [facility]. Hen underwent a swallowing evaluation which recommended NPO due to: There is bolus pooling in the valleculae with repetitive regurgitation from the valleculae into the oral cavity prior to triggering the swallow. There is trace laryngeal penetration during the swallow with full retrieval for puree, honey-thick and nectar-thick liquids. There is inconsistent deep laryngeal penetration before and during the swallow without full retrieval for thin liquids as well as dependance on encouragement to swallow.  current VS 9.7 HR 63 /77.     Allergies:  No Known Allergies      Home Medications:    Hospital Medications:  acetaminophen  Suppository 650 milliGRAM(s) Rectal every 6 hours PRN  acyclovir IVPB      acyclovir IVPB 700 milliGRAM(s) IV Intermittent every 12 hours  amLODIPine   Tablet 5 milliGRAM(s) Oral daily  aspirin enteric coated 81 milliGRAM(s) Oral daily  atorvastatin 80 milliGRAM(s) Oral at bedtime  cefTRIAXone   IVPB 2 Gram(s) IV Intermittent every 12 hours  cefTRIAXone   IVPB      dextrose 5% + sodium chloride 0.9% with potassium chloride 20 mEq/L 1000 milliLiter(s) IV Continuous <Continuous>  fosphenytoin IVPB 125 milliGRAM(s) PE IV Intermittent every 8 hours  heparin  Injectable 5000 Unit(s) SubCutaneous every 12 hours  lacosamide IVPB 200 milliGRAM(s) IV Intermittent every 12 hours  levETIRAcetam  IVPB 1500 milliGRAM(s) IV Intermittent every 12 hours  lidocaine 1% Injectable 20 milliLiter(s) Local Injection once  multivitamin 1 Tablet(s) Oral daily  sodium chloride 0.65% Nasal 1 Spray(s) Both Nostrils four times a day PRN  sodium chloride 0.9% lock flush 3 milliLiter(s) IV Push every 8 hours      PMHX/PSHX:  Dementia without behavioral disturbance, unspecified dementia type  No pertinent past medical history  No significant past surgical history      Family history:  No pertinent family history in first degree relatives      Social History:     ROS:     General:  No wt loss, fevers, chills, night sweats, fatigue,   Eyes:  Good vision, no reported pain  ENT:  No sore throat, pain, runny nose, dysphagia  CV:  No pain, palpitations, hypo/hypertension  Resp:  No dyspnea, cough, tachypnea, wheezing  GI:  See HPI  :  No pain, bleeding, incontinence, nocturia  Muscle:  No pain, weakness  Neuro:  No weakness, tingling, memory problems  Psych:  No fatigue, insomnia, mood problems, depression  Endocrine:  No polyuria, polydipsia, cold/heat intolerance  Heme:  No petechiae, ecchymosis, easy bruisability  Skin:  No rash, edema      PHYSICAL EXAM:     GENERAL:  Appears stated age, well-groomed, well-nourished, no distress  HEENT:  NC/AT,  conjunctivae clear and pink,  no JVD  CHEST:  Full & symmetric excursion, no increased effort, breath sounds clear  HEART:  Regular rhythm, S1, S2, no murmur/rub/S3/S4, no abdominal bruit, no edema  ABDOMEN:  Soft, non-tender, non-distended, normoactive bowel sounds,  no masses , no hepatosplenomegaly  EXTREMITIES:  no cyanosis,clubbing or edema  SKIN:  No rash/erythema/ecchymoses/petechiae/wounds/abscess/warm/dry  NEURO:  Alert, oriented    Vital Signs:  Vital Signs Last 24 Hrs  T(C): 37.1 (31 Dec 2017 13:18), Max: 37.3 (31 Dec 2017 04:34)  T(F): 98.7 (31 Dec 2017 13:18), Max: 99.1 (31 Dec 2017 04:34)  HR: 68 (31 Dec 2017 13:18) (66 - 78)  BP: 132/77 (31 Dec 2017 13:18) (129/75 - 180/90)  BP(mean): --  RR: 18 (31 Dec 2017 13:18) (18 - 19)  SpO2: 99% (31 Dec 2017 13:18) (95% - 99%)  Daily     Daily     LABS:                        11.2   13.30 )-----------( 660      ( 30 Dec 2017 05:34 )             34.2     12-30    138  |  104  |  14  ----------------------------<  132<H>  3.8   |  22  |  0.82    Ca    8.4      30 Dec 2017 05:34    TPro  6.7  /  Alb  2.5<L>  /  TBili  0.4  /  DBili  x   /  AST  72<H>  /  ALT  57<H>  /  AlkPhos  278<H>  12-30    LIVER FUNCTIONS - ( 30 Dec 2017 05:34 )  Alb: 2.5 g/dL / Pro: 6.7 g/dL / ALK PHOS: 278 u/L / ALT: 57 u/L / AST: 72 u/L / GGT: x                   Imaging: Chief Complaint:  Patient is a 77y old  Male who presents with a chief complaint of Seizure x 1 day (16 Dec 2017 01:26)      HPI:  76 yo M with dementia (unclear baseline), p/w episode of obtundation with course c/b seizures x 2 in the ED that initially resolved with Ativan. 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 On numerous antiepileptics. The patient seems to haver returned to [facility] mental status per collateral sources. Hen underwent a swallowing evaluation which recommended NPO due to: There is bolus pooling in the valleculae with repetitive regurgitation from the valleculae into the oral cavity prior to triggering the swallow. There is trace laryngeal penetration during the swallow with full retrieval for puree, honey-thick and nectar-thick liquids. There is inconsistent deep laryngeal penetration before and during the swallow without full retrieval for thin liquids as well as dependance on encouragement to swallow.  current VS 9.7 HR 63 /77. The patient has persistent leukocytosis currently 12.3 of unclear etiology, infectious w/u negative except pt family has declined LP.    Allergies:  No Known Allergies      Home Medications:    Hospital Medications:  acetaminophen  Suppository 650 milliGRAM(s) Rectal every 6 hours PRN  acyclovir IVPB      acyclovir IVPB 700 milliGRAM(s) IV Intermittent every 12 hours  amLODIPine   Tablet 5 milliGRAM(s) Oral daily  aspirin enteric coated 81 milliGRAM(s) Oral daily  atorvastatin 80 milliGRAM(s) Oral at bedtime  cefTRIAXone   IVPB 2 Gram(s) IV Intermittent every 12 hours  cefTRIAXone   IVPB      dextrose 5% + sodium chloride 0.9% with potassium chloride 20 mEq/L 1000 milliLiter(s) IV Continuous <Continuous>  fosphenytoin IVPB 125 milliGRAM(s) PE IV Intermittent every 8 hours  heparin  Injectable 5000 Unit(s) SubCutaneous every 12 hours  lacosamide IVPB 200 milliGRAM(s) IV Intermittent every 12 hours  levETIRAcetam  IVPB 1500 milliGRAM(s) IV Intermittent every 12 hours  lidocaine 1% Injectable 20 milliLiter(s) Local Injection once  multivitamin 1 Tablet(s) Oral daily  sodium chloride 0.65% Nasal 1 Spray(s) Both Nostrils four times a day PRN  sodium chloride 0.9% lock flush 3 milliLiter(s) IV Push every 8 hours      PMHX/PSHX:  Dementia without behavioral disturbance, unspecified dementia type  as above    Family history:  No pertinent family history in first degree relatives                PHYSICAL EXAM:     GENERAL:  Appears stated age, well-groomed, well-nourished, no distress  HEENT:  NC/AT,  conjunctivae clear and pink,  no JVD  CHEST:  Full & symmetric excursion, no increased effort, breath sounds clear  HEART:  Regular rhythm, S1, S2, no murmur/rub/S3/S4, no abdominal bruit, no edema  ABDOMEN:  Soft, non-tender, non-distended, normoactive bowel sounds,  no masses , no hepatosplenomegaly  EXTREMITIES:  no cyanosis,clubbing or edema  SKIN:  No rash/erythema/ecchymoses/petechiae/wounds/abscess/warm/dry  NEURO:  Alert, automatisms    Vital Signs:  Vital Signs Last 24 Hrs  T(C): 37.1 (31 Dec 2017 13:18), Max: 37.3 (31 Dec 2017 04:34)  T(F): 98.7 (31 Dec 2017 13:18), Max: 99.1 (31 Dec 2017 04:34)  HR: 68 (31 Dec 2017 13:18) (66 - 78)  BP: 132/77 (31 Dec 2017 13:18) (129/75 - 180/90)  BP(mean): --  RR: 18 (31 Dec 2017 13:18) (18 - 19)  SpO2: 99% (31 Dec 2017 13:18) (95% - 99%)  Daily     Daily     LABS:                        11.2   13.30 )-----------( 660      ( 30 Dec 2017 05:34 )             34.2     12-30    138  |  104  |  14  ----------------------------<  132<H>  3.8   |  22  |  0.82    Ca    8.4      30 Dec 2017 05:34    TPro  6.7  /  Alb  2.5<L>  /  TBili  0.4  /  DBili  x   /  AST  72<H>  /  ALT  57<H>  /  AlkPhos  278<H>  12-30    LIVER FUNCTIONS - ( 30 Dec 2017 05:34 )  Alb: 2.5 g/dL / Pro: 6.7 g/dL / ALK PHOS: 278 u/L / ALT: 57 u/L / AST: 72 u/L / GGT: x                   Imaging:

## 2017-12-31 NOTE — PROGRESS NOTE ADULT - PROBLEM SELECTOR PLAN 2
Elevated BP reading this morning.  OVeral 24 hour trend has been suboptimally controlled, will start norvasc.  If repeat BP elevated today, will give push of IV hydralazine.

## 2017-12-31 NOTE — CONSULT NOTE ADULT - ASSESSMENT
76 yo M with dementia (unclear baseline), p/w episode of obtundation with course c/b status epilepticus partialis now improved on multiple AED with oropharyngeal dysphagia and elevated amiontransferases/ALP    1. Oropharyngeal Dysphagia    2. Alk phos predominant liver chemistry abnormality 78 yo M with dementia (unclear baseline), p/w episode of obtundation with course c/b status epilepticus partialis now improved on multiple AED with oropharyngeal dysphagia and elevated amiontransferases/ALP    Impression/Recommendation:  1. Oropharyngeal Dysphagia: the swallow evaluation recommended NPO mainly b/c pt does not swallow on his own. The patient swallowed well for me on my exam. I would recommend repeating the swallow evaluation as the patient may do better now that he is alert.

## 2018-01-01 LAB
ALBUMIN SERPL ELPH-MCNC: 2.4 G/DL — LOW (ref 3.3–5)
ALP SERPL-CCNC: 248 U/L — HIGH (ref 40–120)
ALT FLD-CCNC: 56 U/L — HIGH (ref 4–41)
AST SERPL-CCNC: 60 U/L — HIGH (ref 4–40)
BASOPHILS # BLD AUTO: 0.09 K/UL — SIGNIFICANT CHANGE UP (ref 0–0.2)
BASOPHILS NFR BLD AUTO: 0.7 % — SIGNIFICANT CHANGE UP (ref 0–2)
BILIRUB SERPL-MCNC: 0.4 MG/DL — SIGNIFICANT CHANGE UP (ref 0.2–1.2)
BUN SERPL-MCNC: 12 MG/DL — SIGNIFICANT CHANGE UP (ref 7–23)
CALCIUM SERPL-MCNC: 8.4 MG/DL — SIGNIFICANT CHANGE UP (ref 8.4–10.5)
CHLORIDE SERPL-SCNC: 101 MMOL/L — SIGNIFICANT CHANGE UP (ref 98–107)
CO2 SERPL-SCNC: 20 MMOL/L — LOW (ref 22–31)
CREAT SERPL-MCNC: 0.81 MG/DL — SIGNIFICANT CHANGE UP (ref 0.5–1.3)
EOSINOPHIL # BLD AUTO: 0.28 K/UL — SIGNIFICANT CHANGE UP (ref 0–0.5)
EOSINOPHIL NFR BLD AUTO: 2.3 % — SIGNIFICANT CHANGE UP (ref 0–6)
GLUCOSE SERPL-MCNC: 103 MG/DL — HIGH (ref 70–99)
HCT VFR BLD CALC: 34.2 % — LOW (ref 39–50)
HGB BLD-MCNC: 11.4 G/DL — LOW (ref 13–17)
IMM GRANULOCYTES # BLD AUTO: 0.29 # — SIGNIFICANT CHANGE UP
IMM GRANULOCYTES NFR BLD AUTO: 2.4 % — HIGH (ref 0–1.5)
LYMPHOCYTES # BLD AUTO: 1.22 K/UL — SIGNIFICANT CHANGE UP (ref 1–3.3)
LYMPHOCYTES # BLD AUTO: 9.9 % — LOW (ref 13–44)
MAGNESIUM SERPL-MCNC: 1.9 MG/DL — SIGNIFICANT CHANGE UP (ref 1.6–2.6)
MCHC RBC-ENTMCNC: 28.9 PG — SIGNIFICANT CHANGE UP (ref 27–34)
MCHC RBC-ENTMCNC: 33.3 % — SIGNIFICANT CHANGE UP (ref 32–36)
MCV RBC AUTO: 86.8 FL — SIGNIFICANT CHANGE UP (ref 80–100)
MONOCYTES # BLD AUTO: 0.79 K/UL — SIGNIFICANT CHANGE UP (ref 0–0.9)
MONOCYTES NFR BLD AUTO: 6.4 % — SIGNIFICANT CHANGE UP (ref 2–14)
NEUTROPHILS # BLD AUTO: 9.66 K/UL — HIGH (ref 1.8–7.4)
NEUTROPHILS NFR BLD AUTO: 78.3 % — HIGH (ref 43–77)
NRBC # FLD: 0 — SIGNIFICANT CHANGE UP
PHOSPHATE SERPL-MCNC: 2.7 MG/DL — SIGNIFICANT CHANGE UP (ref 2.5–4.5)
PLATELET # BLD AUTO: 684 K/UL — HIGH (ref 150–400)
PMV BLD: 8.4 FL — SIGNIFICANT CHANGE UP (ref 7–13)
POTASSIUM SERPL-MCNC: 4.1 MMOL/L — SIGNIFICANT CHANGE UP (ref 3.5–5.3)
POTASSIUM SERPL-SCNC: 4.1 MMOL/L — SIGNIFICANT CHANGE UP (ref 3.5–5.3)
PROT SERPL-MCNC: 6.6 G/DL — SIGNIFICANT CHANGE UP (ref 6–8.3)
RBC # BLD: 3.94 M/UL — LOW (ref 4.2–5.8)
RBC # FLD: 14 % — SIGNIFICANT CHANGE UP (ref 10.3–14.5)
SODIUM SERPL-SCNC: 136 MMOL/L — SIGNIFICANT CHANGE UP (ref 135–145)
WBC # BLD: 12.33 K/UL — HIGH (ref 3.8–10.5)
WBC # FLD AUTO: 12.33 K/UL — HIGH (ref 3.8–10.5)

## 2018-01-01 PROCEDURE — 99233 SBSQ HOSP IP/OBS HIGH 50: CPT

## 2018-01-01 PROCEDURE — 99222 1ST HOSP IP/OBS MODERATE 55: CPT | Mod: GC

## 2018-01-01 RX ADMIN — Medication 264 MILLIGRAM(S): at 06:00

## 2018-01-01 RX ADMIN — DEXTROSE MONOHYDRATE, SODIUM CHLORIDE, AND POTASSIUM CHLORIDE 50 MILLILITER(S): 50; .745; 4.5 INJECTION, SOLUTION INTRAVENOUS at 17:00

## 2018-01-01 RX ADMIN — Medication 1 TABLET(S): at 11:42

## 2018-01-01 RX ADMIN — AMLODIPINE BESYLATE 5 MILLIGRAM(S): 2.5 TABLET ORAL at 06:00

## 2018-01-01 RX ADMIN — CEFTRIAXONE 100 GRAM(S): 500 INJECTION, POWDER, FOR SOLUTION INTRAMUSCULAR; INTRAVENOUS at 17:00

## 2018-01-01 RX ADMIN — DEXTROSE MONOHYDRATE, SODIUM CHLORIDE, AND POTASSIUM CHLORIDE 50 MILLILITER(S): 50; .745; 4.5 INJECTION, SOLUTION INTRAVENOUS at 06:00

## 2018-01-01 RX ADMIN — LEVETIRACETAM 400 MILLIGRAM(S): 250 TABLET, FILM COATED ORAL at 08:10

## 2018-01-01 RX ADMIN — CEFTRIAXONE 100 GRAM(S): 500 INJECTION, POWDER, FOR SOLUTION INTRAMUSCULAR; INTRAVENOUS at 05:59

## 2018-01-01 RX ADMIN — Medication 81 MILLIGRAM(S): at 11:42

## 2018-01-01 RX ADMIN — SODIUM CHLORIDE 3 MILLILITER(S): 9 INJECTION INTRAMUSCULAR; INTRAVENOUS; SUBCUTANEOUS at 22:44

## 2018-01-01 RX ADMIN — Medication 264 MILLIGRAM(S): at 17:58

## 2018-01-01 RX ADMIN — FOSPHENYTOIN 105 MILLIGRAM(S) PE: 50 INJECTION INTRAMUSCULAR; INTRAVENOUS at 14:03

## 2018-01-01 RX ADMIN — LACOSAMIDE 140 MILLIGRAM(S): 50 TABLET ORAL at 17:34

## 2018-01-01 RX ADMIN — FOSPHENYTOIN 105 MILLIGRAM(S) PE: 50 INJECTION INTRAMUSCULAR; INTRAVENOUS at 22:45

## 2018-01-01 RX ADMIN — HEPARIN SODIUM 5000 UNIT(S): 5000 INJECTION INTRAVENOUS; SUBCUTANEOUS at 06:00

## 2018-01-01 RX ADMIN — LACOSAMIDE 140 MILLIGRAM(S): 50 TABLET ORAL at 05:30

## 2018-01-01 RX ADMIN — FOSPHENYTOIN 105 MILLIGRAM(S) PE: 50 INJECTION INTRAMUSCULAR; INTRAVENOUS at 05:00

## 2018-01-01 RX ADMIN — SODIUM CHLORIDE 3 MILLILITER(S): 9 INJECTION INTRAMUSCULAR; INTRAVENOUS; SUBCUTANEOUS at 14:01

## 2018-01-01 RX ADMIN — LEVETIRACETAM 400 MILLIGRAM(S): 250 TABLET, FILM COATED ORAL at 22:32

## 2018-01-01 RX ADMIN — SODIUM CHLORIDE 3 MILLILITER(S): 9 INJECTION INTRAMUSCULAR; INTRAVENOUS; SUBCUTANEOUS at 06:01

## 2018-01-01 RX ADMIN — HEPARIN SODIUM 5000 UNIT(S): 5000 INJECTION INTRAVENOUS; SUBCUTANEOUS at 17:36

## 2018-01-01 NOTE — PROGRESS NOTE ADULT - PROBLEM SELECTOR PLAN 3
- So far all cultures have been negative  - Concern for underlying viral illness vs HSV encephalitis  -Hold off LP as above, daughter in law Chelita [phone] would not consent to procedure without sedation  - c/w ceftriaxone and acyclovir, f/u ID, recommend 10 day total sof abx.  Continue with IVF while on IV acyclovir.   - serologies negative, cryptococcal antigen and HIV negative

## 2018-01-01 NOTE — PROGRESS NOTE ADULT - SUBJECTIVE AND OBJECTIVE BOX
Patient is a 77y old  Male who presents with a chief complaint of Seizure x 1 day (16 Dec 2017 01:26)      SUBJECTIVE / OVERNIGHT EVENTS: No major overnight events.  Remains afebrile, seizure free.  Poor historian, continues to state that he feels unwell everywhere.  No N/V/D, abdominal exam benign.        MEDICATIONS  (STANDING):  acyclovir IVPB      acyclovir IVPB 700 milliGRAM(s) IV Intermittent every 12 hours  amLODIPine   Tablet 5 milliGRAM(s) Oral daily  aspirin enteric coated 81 milliGRAM(s) Oral daily  atorvastatin 80 milliGRAM(s) Oral at bedtime  cefTRIAXone   IVPB 2 Gram(s) IV Intermittent every 12 hours  cefTRIAXone   IVPB      dextrose 5% + sodium chloride 0.9% with potassium chloride 20 mEq/L 1000 milliLiter(s) (50 mL/Hr) IV Continuous <Continuous>  fosphenytoin IVPB 125 milliGRAM(s) PE IV Intermittent every 8 hours  heparin  Injectable 5000 Unit(s) SubCutaneous every 12 hours  lacosamide IVPB 200 milliGRAM(s) IV Intermittent every 12 hours  levETIRAcetam  IVPB 1500 milliGRAM(s) IV Intermittent every 12 hours  lidocaine 1% Injectable 20 milliLiter(s) Local Injection once  multivitamin 1 Tablet(s) Oral daily  sodium chloride 0.9% lock flush 3 milliLiter(s) IV Push every 8 hours    MEDICATIONS  (PRN):  acetaminophen  Suppository 650 milliGRAM(s) Rectal every 6 hours PRN For Temp greater than 38 C (100.4 F)  sodium chloride 0.65% Nasal 1 Spray(s) Both Nostrils four times a day PRN Congestion      Vital Signs Last 24 Hrs  T(C): 36.7 (01 Jan 2018 04:37), Max: 37.1 (31 Dec 2017 13:18)  T(F): 98.1 (01 Jan 2018 04:37), Max: 98.7 (31 Dec 2017 13:18)  HR: 68 (01 Jan 2018 04:37) (66 - 82)  BP: 144/78 (01 Jan 2018 04:37) (129/75 - 150/92)  BP(mean): --  RR: 17 (01 Jan 2018 04:37) (17 - 18)  SpO2: 98% (01 Jan 2018 04:37) (97% - 99%)  CAPILLARY BLOOD GLUCOSE      PHYSICAL EXAM  GENERAL: NAD, well-developed  HEAD:  Atraumatic, Normocephalic  EYES: EOMI, PERRLA, conjunctiva and sclera clear  NECK: Supple, No JVD  CHEST/LUNG: Clear to auscultation bilaterally; No wheeze  HEART: Regular rate and rhythm; No murmurs, rubs, or gallops  ABDOMEN: Soft, Nontender, Nondistended; Bowel sounds present  EXTREMITIES:  Contracted, No clubbing, cyanosis, or edema  PSYCH: AAOx1  SKIN: No rashes or lesions    LABS:                        11.4   12.33 )-----------( 684      ( 01 Jan 2018 05:15 )             34.2     01-01    136  |  101  |  12  ----------------------------<  103<H>  4.1   |  20<L>  |  0.81    Ca    8.4      01 Jan 2018 05:15  Phos  2.7     01-01  Mg     1.9     01-01    TPro  6.6  /  Alb  2.4<L>  /  TBili  0.4  /  DBili  x   /  AST  60<H>  /  ALT  56<H>  /  AlkPhos  248<H>  01-01              RADIOLOGY & ADDITIONAL TESTS:    Imaging Personally Reviewed:  Consultant(s) Notes Reviewed:    Care Discussed with Consultants/Other Providers:

## 2018-01-01 NOTE — PROGRESS NOTE ADULT - PROBLEM SELECTOR PLAN 8
Rising transaminitis noted on labs over weekend.  Abdominal exam is benign.  Possible adverse effect from medications i.e. new high intensity statin vs Lacosamide (rare to cause transaminitis) vs IV Ceftriaxone (known to cause biliary stasis)  Will continue to monitor daily CMP.  Consider RUQ sono

## 2018-01-02 DIAGNOSIS — R13.10 DYSPHAGIA, UNSPECIFIED: ICD-10-CM

## 2018-01-02 LAB
ALBUMIN SERPL ELPH-MCNC: 2.6 G/DL — LOW (ref 3.3–5)
ALP SERPL-CCNC: 259 U/L — HIGH (ref 40–120)
ALT FLD-CCNC: 71 U/L — HIGH (ref 4–41)
AST SERPL-CCNC: 73 U/L — HIGH (ref 4–40)
BASOPHILS # BLD AUTO: 0.09 K/UL — SIGNIFICANT CHANGE UP (ref 0–0.2)
BASOPHILS NFR BLD AUTO: 0.6 % — SIGNIFICANT CHANGE UP (ref 0–2)
BILIRUB SERPL-MCNC: 0.4 MG/DL — SIGNIFICANT CHANGE UP (ref 0.2–1.2)
BUN SERPL-MCNC: 13 MG/DL — SIGNIFICANT CHANGE UP (ref 7–23)
CALCIUM SERPL-MCNC: 8.9 MG/DL — SIGNIFICANT CHANGE UP (ref 8.4–10.5)
CHLORIDE SERPL-SCNC: 99 MMOL/L — SIGNIFICANT CHANGE UP (ref 98–107)
CO2 SERPL-SCNC: 21 MMOL/L — LOW (ref 22–31)
CREAT SERPL-MCNC: 0.87 MG/DL — SIGNIFICANT CHANGE UP (ref 0.5–1.3)
EOSINOPHIL # BLD AUTO: 0.17 K/UL — SIGNIFICANT CHANGE UP (ref 0–0.5)
EOSINOPHIL NFR BLD AUTO: 1.2 % — SIGNIFICANT CHANGE UP (ref 0–6)
GLUCOSE SERPL-MCNC: 127 MG/DL — HIGH (ref 70–99)
HCT VFR BLD CALC: 37.2 % — LOW (ref 39–50)
HGB BLD-MCNC: 12.3 G/DL — LOW (ref 13–17)
IMM GRANULOCYTES # BLD AUTO: 0.27 # — SIGNIFICANT CHANGE UP
IMM GRANULOCYTES NFR BLD AUTO: 1.9 % — HIGH (ref 0–1.5)
LYMPHOCYTES # BLD AUTO: 1.16 K/UL — SIGNIFICANT CHANGE UP (ref 1–3.3)
LYMPHOCYTES # BLD AUTO: 8 % — LOW (ref 13–44)
MAGNESIUM SERPL-MCNC: 2 MG/DL — SIGNIFICANT CHANGE UP (ref 1.6–2.6)
MCHC RBC-ENTMCNC: 28.6 PG — SIGNIFICANT CHANGE UP (ref 27–34)
MCHC RBC-ENTMCNC: 33.1 % — SIGNIFICANT CHANGE UP (ref 32–36)
MCV RBC AUTO: 86.5 FL — SIGNIFICANT CHANGE UP (ref 80–100)
MONOCYTES # BLD AUTO: 0.97 K/UL — HIGH (ref 0–0.9)
MONOCYTES NFR BLD AUTO: 6.7 % — SIGNIFICANT CHANGE UP (ref 2–14)
NEUTROPHILS # BLD AUTO: 11.85 K/UL — HIGH (ref 1.8–7.4)
NEUTROPHILS NFR BLD AUTO: 81.6 % — HIGH (ref 43–77)
NRBC # FLD: 0 — SIGNIFICANT CHANGE UP
PHOSPHATE SERPL-MCNC: 3.2 MG/DL — SIGNIFICANT CHANGE UP (ref 2.5–4.5)
PLATELET # BLD AUTO: 687 K/UL — HIGH (ref 150–400)
PMV BLD: 8.2 FL — SIGNIFICANT CHANGE UP (ref 7–13)
POTASSIUM SERPL-MCNC: 4.5 MMOL/L — SIGNIFICANT CHANGE UP (ref 3.5–5.3)
POTASSIUM SERPL-SCNC: 4.5 MMOL/L — SIGNIFICANT CHANGE UP (ref 3.5–5.3)
PROT SERPL-MCNC: 7.2 G/DL — SIGNIFICANT CHANGE UP (ref 6–8.3)
RBC # BLD: 4.3 M/UL — SIGNIFICANT CHANGE UP (ref 4.2–5.8)
RBC # FLD: 14.3 % — SIGNIFICANT CHANGE UP (ref 10.3–14.5)
SODIUM SERPL-SCNC: 132 MMOL/L — LOW (ref 135–145)
WBC # BLD: 14.51 K/UL — HIGH (ref 3.8–10.5)
WBC # FLD AUTO: 14.51 K/UL — HIGH (ref 3.8–10.5)

## 2018-01-02 PROCEDURE — 93975 VASCULAR STUDY: CPT | Mod: 26

## 2018-01-02 PROCEDURE — 99232 SBSQ HOSP IP/OBS MODERATE 35: CPT

## 2018-01-02 PROCEDURE — 99233 SBSQ HOSP IP/OBS HIGH 50: CPT

## 2018-01-02 RX ADMIN — LACOSAMIDE 140 MILLIGRAM(S): 50 TABLET ORAL at 17:45

## 2018-01-02 RX ADMIN — SODIUM CHLORIDE 3 MILLILITER(S): 9 INJECTION INTRAMUSCULAR; INTRAVENOUS; SUBCUTANEOUS at 12:45

## 2018-01-02 RX ADMIN — LEVETIRACETAM 400 MILLIGRAM(S): 250 TABLET, FILM COATED ORAL at 08:22

## 2018-01-02 RX ADMIN — HEPARIN SODIUM 5000 UNIT(S): 5000 INJECTION INTRAVENOUS; SUBCUTANEOUS at 17:45

## 2018-01-02 RX ADMIN — LEVETIRACETAM 400 MILLIGRAM(S): 250 TABLET, FILM COATED ORAL at 21:04

## 2018-01-02 RX ADMIN — FOSPHENYTOIN 105 MILLIGRAM(S) PE: 50 INJECTION INTRAMUSCULAR; INTRAVENOUS at 21:27

## 2018-01-02 RX ADMIN — CEFTRIAXONE 100 GRAM(S): 500 INJECTION, POWDER, FOR SOLUTION INTRAMUSCULAR; INTRAVENOUS at 06:00

## 2018-01-02 RX ADMIN — SODIUM CHLORIDE 3 MILLILITER(S): 9 INJECTION INTRAMUSCULAR; INTRAVENOUS; SUBCUTANEOUS at 21:05

## 2018-01-02 RX ADMIN — SODIUM CHLORIDE 3 MILLILITER(S): 9 INJECTION INTRAMUSCULAR; INTRAVENOUS; SUBCUTANEOUS at 05:08

## 2018-01-02 RX ADMIN — Medication 264 MILLIGRAM(S): at 05:59

## 2018-01-02 RX ADMIN — FOSPHENYTOIN 105 MILLIGRAM(S) PE: 50 INJECTION INTRAMUSCULAR; INTRAVENOUS at 13:49

## 2018-01-02 RX ADMIN — ATORVASTATIN CALCIUM 80 MILLIGRAM(S): 80 TABLET, FILM COATED ORAL at 21:05

## 2018-01-02 RX ADMIN — DEXTROSE MONOHYDRATE, SODIUM CHLORIDE, AND POTASSIUM CHLORIDE 50 MILLILITER(S): 50; .745; 4.5 INJECTION, SOLUTION INTRAVENOUS at 05:11

## 2018-01-02 RX ADMIN — LACOSAMIDE 140 MILLIGRAM(S): 50 TABLET ORAL at 05:11

## 2018-01-02 RX ADMIN — FOSPHENYTOIN 105 MILLIGRAM(S) PE: 50 INJECTION INTRAMUSCULAR; INTRAVENOUS at 06:00

## 2018-01-02 RX ADMIN — HEPARIN SODIUM 5000 UNIT(S): 5000 INJECTION INTRAVENOUS; SUBCUTANEOUS at 05:11

## 2018-01-02 NOTE — SWALLOW BEDSIDE ASSESSMENT ADULT - SWALLOW EVAL: DIAGNOSIS
1- moderate oral dysphagia given puree and honey thick liquid textures marked by reduced bolus awareness, reduced stripping ability from utensil (teaspoon and cup) with delayed bolus collection, transfer and transport. pt noted to hold presented textures in oral cavity despite maximum verbal and tactile (laryngeal massage) prompting to propel bolus posteriorly. 2- severe pharyngeal dysphagia given puree and honey thick liquids marked by absent swallow trigger and absent hyolaryngeal excursion. however, reflexive coughing noted suggesting posterior loss over the base of tongue into the hypopharynx with provided textures. 3- remaining textures removed from pt's oral cavity via Yankauer suctioning by this clinician.
1.) Patient presents with adequate oral stage skills for puree, mechanical soft solids, solids, thin liquids, nectar thickened liquids, and honey thickened liquids  marked by adequate bolus acceptance, formation, transfer and clearance.  2.) Increased mastication noted for mechanical soft solids and solids, however; rotary chewing pattern observed with adequate mastication and breakdown of both mechanical soft solids prior to swallow.  3.) Pharyngeal stage of swallow for puree and nectar thickened liquids, and honey thickened liquids, marked by swallow trigger initiation, hyolaryngeal elevation upon digital palpation, and no overt s/s of penetration/aspiration.   4.) Pharyngeal stage dysphagia for thin liquids and mechanical soft solids marked by swallow trigger initiation, hyolaryngeal elevation upon digital palpation, and overt signs and symptoms of penetration/aspiration in the form of intermittent throat clearing immediately post swallow.
1- mild oral dysphagia given puree, honey thick liquid and nectar thick liquid textures marked by reduced stripping ability from utensil resulting in delayed bolus collection, transfer and transport. 2- moderate oral dysphagia given thin liquids marked by reduced labial seal resulting in partial anterior loss of presented texture with delayed bolus collection, transfer and transport noted. 3- moderate pharyngeal dysphagia given puree and honey thick liquid textures marked by delayed swallow trigger and reduced hyolaryngeal excursion. pt observed utilizing multiple swallows for each PO trial (~3 swallows per PO trial) suggestive of pharyngeal stasis. 4- moderate-severe pharyngeal dysphagia given nectar thick and thin liquid textures marked by suspected posterior loss over the base of tongue prior to initiation of pharyngeal swallow trigger, with subsequently delayed swallow trigger and reduced hyolaryngeal excursion resulting in immediate and delayed reflexive coughing, suggestive of

## 2018-01-02 NOTE — PROGRESS NOTE ADULT - SUBJECTIVE AND OBJECTIVE BOX
Follow Up:  concern for meningtis/encephalitis    Interval History/ROS:  denies headache.  no seizures overnight.  no fever.  Unable to obtain ROS - patient confused.    Allergies  No Known Allergies    ANTIMICROBIALS:  acyclovir IVPB 700 every 12 hours - D#11  cefTRIAXone   IVPB 2 every 12 hours - D#11    MEDICATIONS  (STANDING):  amLODIPine   Tablet 5 daily  aspirin enteric coated 81 daily  atorvastatin 80 at bedtime  fosphenytoin IVPB 125 every 8 hours  heparin  Injectable 5000 every 12 hours  lacosamide IVPB 200 every 12 hours  levETIRAcetam  IVPB 1500 every 12 hours    Vital Signs Last 24 Hrs  T(F): 98.9 (01-02-18 @ 05:07), Max: 98.9 (01-01-18 @ 20:39)  HR: 80 (01-02-18 @ 05:07)  BP: 142/74 (01-02-18 @ 05:07)  RR: 18 (01-02-18 @ 05:07)  SpO2: 97% (01-02-18 @ 05:07) (97% - 100%)  Wt(kg): --    PHYSICAL EXAM:  General: non-toxic, lying comfortably in bed.  answers no to most queries  HEAD/EYES: anicteric  ENT:  supple  Cardiovascular:   S1, S2  Respiratory:  clear bilaterally  GI:  soft, non-tender, normal bowel sounds  :  no CVA tenderness   Musculoskeletal:  no synovitis  Neurologic:  not oriented  Skin:  no rash  Lymph: no lymphadenopathy  Psychiatric:  appropriate affect  Vascular:  no phlebitis                        12.3   14.51 )-----------( 687      ( 02 Jan 2018 06:03 )             37.2 01-02    132  |  99  |  13  ----------------------------<  127  4.5   |  21  |  0.87  Ca    8.9      02 Jan 2018 06:03Phos  3.2     01-02Mg     2.0     01-02  TPro  7.2  /  Alb  2.6  /  TBili  0.4  /  DBili  x   /  AST  73  /  ALT  71  /  AlkPhos  259  01-02    MICROBIOLOGY:  Culture - Urine (12.22.17 @ 20:15)    Culture - Urine:   NO GROWTH AT 24 HOURS    Specimen Source: URINE MIDSTREAM    Culture - Blood (12.22.17 @ 20:11)    Culture - Blood:   NO ORGANISMS ISOLATED    Specimen Source: BLOOD VENOUS    RADIOLOGY:  MR Head No Cont (12.26.17 @ 11:05) >  IMPRESSION:  Abnormal signal involves the right cingulate gyrus-medial frontal parietal cortex as described. Some considerations include evolving subacute ischemia, postictal cortical edema, or encephalitis.  A 1.8 cm x 1 cm nonspecific right parietal scalp lesion is present at the vertex. Although this could reflect a sebaceous cyst, correlate with direct visualization findings to exclude other etiologies for scalp lesions.

## 2018-01-02 NOTE — SWALLOW BEDSIDE ASSESSMENT ADULT - SPECIFY REASON(S)
to assess swallow function
to re-assess the swallow mechanism as medical status has improved ; r/o dysphagia.
to reassess swallow function. pt is familiar to this service as he has been seen for multiple bedside assessments and a cinesophagram during this admission (please see reports for details)
to reassess swallow function. pt is familiar to this service as he was seen for multiple bedside assessments during this admission (please see reports for details).

## 2018-01-02 NOTE — PROGRESS NOTE ADULT - SUBJECTIVE AND OBJECTIVE BOX
Patient is a 77y old  Male who presents with a chief complaint of Seizure x 1 day (16 Dec 2017 01:26)      SUBJECTIVE / OVERNIGHT EVENTS:  No complaints     MEDICATIONS  (STANDING):  amLODIPine   Tablet 5 milliGRAM(s) Oral daily  aspirin enteric coated 81 milliGRAM(s) Oral daily  atorvastatin 80 milliGRAM(s) Oral at bedtime  dextrose 5% + sodium chloride 0.9% with potassium chloride 20 mEq/L 1000 milliLiter(s) (50 mL/Hr) IV Continuous <Continuous>  fosphenytoin IVPB 125 milliGRAM(s) PE IV Intermittent every 8 hours  heparin  Injectable 5000 Unit(s) SubCutaneous every 12 hours  lacosamide IVPB 200 milliGRAM(s) IV Intermittent every 12 hours  levETIRAcetam  IVPB 1500 milliGRAM(s) IV Intermittent every 12 hours  lidocaine 1% Injectable 20 milliLiter(s) Local Injection once  multivitamin 1 Tablet(s) Oral daily  sodium chloride 0.9% lock flush 3 milliLiter(s) IV Push every 8 hours    MEDICATIONS  (PRN):  acetaminophen  Suppository 650 milliGRAM(s) Rectal every 6 hours PRN For Temp greater than 38 C (100.4 F)  sodium chloride 0.65% Nasal 1 Spray(s) Both Nostrils four times a day PRN Congestion      T(C): 37.7 (01-02-18 @ 12:56), Max: 37.7 (01-02-18 @ 12:56)  HR: 76 (01-02-18 @ 12:56) (69 - 80)  BP: 148/80 (01-02-18 @ 12:56) (114/64 - 148/80)  RR: 17 (01-02-18 @ 12:56) (17 - 18)  SpO2: 96% (01-02-18 @ 12:56) (96% - 100%)  CAPILLARY BLOOD GLUCOSE        I&O's Summary      PHYSICAL EXAM:  GENERAL: NAD, well-developed  HEAD:  Atraumatic, Normocephalic  EYES: EOMI, PERRLA, conjunctiva and sclera clear  NECK: Supple, No JVD  CHEST/LUNG: Clear to auscultation bilaterally; No wheeze  HEART: s1 s2, regular rhythm and rate   ABDOMEN: Soft, Nontender, Nondistended; Bowel sounds present  EXTREMITIES:  2+ Peripheral Pulses, No clubbing, cyanosis, or edema  PSYCH: AAOx3, calm   NEUROLOGY: non-focal  SKIN: No rashes or lesions    LABS:                        12.3   14.51 )-----------( 687      ( 02 Jan 2018 06:03 )             37.2     01-02    132<L>  |  99  |  13  ----------------------------<  127<H>  4.5   |  21<L>  |  0.87    Ca    8.9      02 Jan 2018 06:03  Phos  3.2     01-02  Mg     2.0     01-02    TPro  7.2  /  Alb  2.6<L>  /  TBili  0.4  /  DBili  x   /  AST  73<H>  /  ALT  71<H>  /  AlkPhos  259<H>  01-02              RADIOLOGY & ADDITIONAL TESTS:    Imaging Personally Reviewed:    Consultant(s) Notes Reviewed:      Care Discussed with Consultants/Other Providers: Patient is a 77y old  Male who presents with a chief complaint of Seizure x 1 day (16 Dec 2017 01:26)      SUBJECTIVE / OVERNIGHT EVENTS:  No complaints     MEDICATIONS  (STANDING):  amLODIPine   Tablet 5 milliGRAM(s) Oral daily  aspirin enteric coated 81 milliGRAM(s) Oral daily  atorvastatin 80 milliGRAM(s) Oral at bedtime  dextrose 5% + sodium chloride 0.9% with potassium chloride 20 mEq/L 1000 milliLiter(s) (50 mL/Hr) IV Continuous <Continuous>  fosphenytoin IVPB 125 milliGRAM(s) PE IV Intermittent every 8 hours  heparin  Injectable 5000 Unit(s) SubCutaneous every 12 hours  lacosamide IVPB 200 milliGRAM(s) IV Intermittent every 12 hours  levETIRAcetam  IVPB 1500 milliGRAM(s) IV Intermittent every 12 hours  lidocaine 1% Injectable 20 milliLiter(s) Local Injection once  multivitamin 1 Tablet(s) Oral daily  sodium chloride 0.9% lock flush 3 milliLiter(s) IV Push every 8 hours    MEDICATIONS  (PRN):  acetaminophen  Suppository 650 milliGRAM(s) Rectal every 6 hours PRN For Temp greater than 38 C (100.4 F)  sodium chloride 0.65% Nasal 1 Spray(s) Both Nostrils four times a day PRN Congestion      T(C): 37.7 (01-02-18 @ 12:56), Max: 37.7 (01-02-18 @ 12:56)  HR: 76 (01-02-18 @ 12:56) (69 - 80)  BP: 148/80 (01-02-18 @ 12:56) (114/64 - 148/80)  RR: 17 (01-02-18 @ 12:56) (17 - 18)  SpO2: 96% (01-02-18 @ 12:56) (96% - 100%)  CAPILLARY BLOOD GLUCOSE        I&O's Summary      PHYSICAL EXAM  GENERAL: NAD, well-developed  HEAD:  Atraumatic, Normocephalic  EYES: EOMI, PERRLA, conjunctiva and sclera clear  NECK: Supple, No JVD  CHEST/LUNG: Clear to auscultation bilaterally; No wheeze  HEART: Regular rate and rhythm; No murmurs, rubs, or gallops  ABDOMEN: Soft, Nontender, Nondistended; Bowel sounds present  EXTREMITIES:  Contracted, No clubbing, cyanosis, or edema  PSYCH: AAOx1  SKIN: No rashes or lesions      LABS:                        12.3   14.51 )-----------( 687      ( 02 Jan 2018 06:03 )             37.2     01-02    132<L>  |  99  |  13  ----------------------------<  127<H>  4.5   |  21<L>  |  0.87    Ca    8.9      02 Jan 2018 06:03  Phos  3.2     01-02  Mg     2.0     01-02    TPro  7.2  /  Alb  2.6<L>  /  TBili  0.4  /  DBili  x   /  AST  73<H>  /  ALT  71<H>  /  AlkPhos  259<H>  01-02              RADIOLOGY & ADDITIONAL TESTS:    Imaging Personally Reviewed:    Consultant(s) Notes Reviewed:      Care Discussed with Consultants/Other Providers: Patient is a 77y old  Male who presents with a chief complaint of Seizure x 1 day (16 Dec 2017 01:26)      SUBJECTIVE / OVERNIGHT EVENTS:  No complaints     MEDICATIONS  (STANDING):  amLODIPine   Tablet 5 milliGRAM(s) Oral daily  aspirin enteric coated 81 milliGRAM(s) Oral daily  atorvastatin 80 milliGRAM(s) Oral at bedtime  dextrose 5% + sodium chloride 0.9% with potassium chloride 20 mEq/L 1000 milliLiter(s) (50 mL/Hr) IV Continuous <Continuous>  fosphenytoin IVPB 125 milliGRAM(s) PE IV Intermittent every 8 hours  heparin  Injectable 5000 Unit(s) SubCutaneous every 12 hours  lacosamide IVPB 200 milliGRAM(s) IV Intermittent every 12 hours  levETIRAcetam  IVPB 1500 milliGRAM(s) IV Intermittent every 12 hours  lidocaine 1% Injectable 20 milliLiter(s) Local Injection once  multivitamin 1 Tablet(s) Oral daily  sodium chloride 0.9% lock flush 3 milliLiter(s) IV Push every 8 hours    MEDICATIONS  (PRN):  acetaminophen  Suppository 650 milliGRAM(s) Rectal every 6 hours PRN For Temp greater than 38 C (100.4 F)  sodium chloride 0.65% Nasal 1 Spray(s) Both Nostrils four times a day PRN Congestion      T(C): 37.7 (01-02-18 @ 12:56), Max: 37.7 (01-02-18 @ 12:56)  HR: 76 (01-02-18 @ 12:56) (69 - 80)  BP: 148/80 (01-02-18 @ 12:56) (114/64 - 148/80)  RR: 17 (01-02-18 @ 12:56) (17 - 18)  SpO2: 96% (01-02-18 @ 12:56) (96% - 100%)  CAPILLARY BLOOD GLUCOSE        I&O's Summary      PHYSICAL EXAM  GENERAL: NAD  HEAD:  Atraumatic, Normocephalic  EYES: EOMI, PERRLA, conjunctiva and sclera clear  NECK: Supple, No JVD  CHEST/LUNG: Clear to auscultation bilaterally; No wheeze  HEART: Regular rate and rhythm; No murmurs, rubs, or gallops  ABDOMEN: Soft, Nontender, Nondistended; Bowel sounds present  EXTREMITIES:  Contracted, No clubbing, cyanosis, or edema  PSYCH: AAOx1  SKIN: No rashes or lesions      LABS:                        12.3   14.51 )-----------( 687      ( 02 Jan 2018 06:03 )             37.2     01-02    132<L>  |  99  |  13  ----------------------------<  127<H>  4.5   |  21<L>  |  0.87    Ca    8.9      02 Jan 2018 06:03  Phos  3.2     01-02  Mg     2.0     01-02    TPro  7.2  /  Alb  2.6<L>  /  TBili  0.4  /  DBili  x   /  AST  73<H>  /  ALT  71<H>  /  AlkPhos  259<H>  01-02              RADIOLOGY & ADDITIONAL TESTS:    Imaging Personally Reviewed:    Consultant(s) Notes Reviewed:      Care Discussed with Consultants/Other Providers:

## 2018-01-02 NOTE — SWALLOW BEDSIDE ASSESSMENT ADULT - NS ASR SWALLOW FINDINGS DISCUS
Physician/Nursing/Patient
Patient/Physician/discussed with Deena HWANG) on the unit
Physician/Patient/Nursing

## 2018-01-02 NOTE — SWALLOW BEDSIDE ASSESSMENT ADULT - COMMENTS
Pt was alert and cooperative for a  clinical reassessment of swallow function this PM. Per charting, pt is a Per charting, pt is a 76 y/o male with PMHx significant for dementia who is currently admitted to [facility] for seizures. CXR completed on 12/23/2017 revealed "Cardiac size is not accurately evaluated in this projection.  Lungs are clear.  No pleural effusions or pneumothorax. No acute osseous abnormality"

## 2018-01-02 NOTE — SWALLOW BEDSIDE ASSESSMENT ADULT - ASR SWALLOW LABIAL MOBILITY
within functional limits
unable to formally assess as pt unable to follow low-level, verbally presented directives
within functional limits

## 2018-01-02 NOTE — SWALLOW BEDSIDE ASSESSMENT ADULT - SLP PERTINENT HISTORY OF CURRENT PROBLEM
r/o dysphagia
76 yo M with dementia (unclear baseline), p/w episode of obtundation with course c/b seizures x 2 in the ED that initially resolved with Ativan. 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 Hospital course now complicated by fever, SIRS, cannot r/o meningitis vs encephalitis.

## 2018-01-02 NOTE — SWALLOW BEDSIDE ASSESSMENT ADULT - PHARYNGEAL PHASE
Delayed pharyngeal swallow/Decreased laryngeal elevation/Multiple swallows Delayed pharyngeal swallow/Decreased laryngeal elevation/Delayed throat clear post oral intake/Throat clear post oral intake/Delayed cough post oral intake/Multiple swallows/Cough post oral intake Delayed pharyngeal swallow/Multiple swallows/Decreased laryngeal elevation/Delayed cough post oral intake/Cough post oral intake/Delayed throat clear post oral intake/Throat clear post oral intake

## 2018-01-02 NOTE — SWALLOW BEDSIDE ASSESSMENT ADULT - ASR SWALLOW ASPIRATION MONITOR
oral hygiene/position upright (90Y)/throat clearing/fever/pneumonia/upper respiratory infection/change of breathing pattern/cough/gurgly voice
cough/gurgly voice/pneumonia/upper respiratory infection/change of breathing pattern/throat clearing/fever
oral hygiene/change of breathing pattern/position upright (90Y)/fever/throat clearing/gurgly voice/pneumonia/upper respiratory infection/cough

## 2018-01-02 NOTE — SWALLOW BEDSIDE ASSESSMENT ADULT - SWALLOW EVAL: RECOMMENDED DIET
initiate dysphagia 1 with nectar thickened liquids
1- oral nutrition/hydration is contraindicated at this time 2- consider ST alternative means of nutrition as pt is at an increased nutritional risk.
1- oral nutrition/hydration is contraindicated at this time 2- consider ST alternative means of nutrition as pt is at an increased nutritional risk

## 2018-01-02 NOTE — SWALLOW BEDSIDE ASSESSMENT ADULT - SWALLOW EVAL: CURRENT DIET
NPO pending formal speech and swallow evaluation, per MD order
mechanical soft solids and thin liquids

## 2018-01-02 NOTE — SWALLOW BEDSIDE ASSESSMENT ADULT - SWALLOW EVAL: PROGNOSIS
guarded given current cognitive state
IMPRESSIONS CONTINUED: 5.) Severe pharyngeal stage dysphagia noted for thin liquids and solids marked by swallow trigger initiation, hyolaryngeal elevation upon digital palpation, with intermittent coughing and intermittent wet vocal quality immediately post swallow.  6.) At this time, due to inconsistent throat clearing on thin liquids, mechanical soft, and solids, it is recommended that an objecitve swallow study is performd to rule out penetration/aspiration on these consistnecies.
penetration/aspiration.

## 2018-01-02 NOTE — SWALLOW BEDSIDE ASSESSMENT ADULT - ASR SWALLOW RECOMMEND DIAG
VFSS/MBS/objective swallow study to rule out penetration/aspiration secondary to intermittent coughing/throat clearing on thin liquids, mechanical soft and solids
VFSS/MBS

## 2018-01-02 NOTE — SWALLOW BEDSIDE ASSESSMENT ADULT - ASR SWALLOW REFERRAL
to ensure adequate daily caloric intake/Registered Dietitian
Registered Dietitian/to ensure adequate daily caloric intake

## 2018-01-02 NOTE — PROGRESS NOTE ADULT - ASSESSMENT
77M admitted 12/15 with seizure, fever.  No LP done or going to be done.  MRI with abnormal signal involves the right cingulate gyrus-medial frontal parietal cortex as described. Some considerations include evolving subacute ischemia, postictal cortical edema, or encephalitis.  [place] serology negative.  At this point, he has completed empiric IV acyclovir and Ceftriaxone    suggest:  - stop acyclovir  - stop ceftriaxone

## 2018-01-03 DIAGNOSIS — A41.9 SEPSIS, UNSPECIFIED ORGANISM: ICD-10-CM

## 2018-01-03 LAB
ALBUMIN SERPL ELPH-MCNC: 2.3 G/DL — LOW (ref 3.3–5)
ALP SERPL-CCNC: 269 U/L — HIGH (ref 40–120)
ALT FLD-CCNC: 79 U/L — HIGH (ref 4–41)
AST SERPL-CCNC: 90 U/L — HIGH (ref 4–40)
BASOPHILS # BLD AUTO: 0.11 K/UL — SIGNIFICANT CHANGE UP (ref 0–0.2)
BASOPHILS NFR BLD AUTO: 1.2 % — SIGNIFICANT CHANGE UP (ref 0–2)
BILIRUB SERPL-MCNC: 0.4 MG/DL — SIGNIFICANT CHANGE UP (ref 0.2–1.2)
BUN SERPL-MCNC: 13 MG/DL — SIGNIFICANT CHANGE UP (ref 7–23)
CALCIUM SERPL-MCNC: 8.7 MG/DL — SIGNIFICANT CHANGE UP (ref 8.4–10.5)
CHLORIDE SERPL-SCNC: 103 MMOL/L — SIGNIFICANT CHANGE UP (ref 98–107)
CO2 SERPL-SCNC: 21 MMOL/L — LOW (ref 22–31)
CREAT SERPL-MCNC: 0.8 MG/DL — SIGNIFICANT CHANGE UP (ref 0.5–1.3)
EOSINOPHIL # BLD AUTO: 0.16 K/UL — SIGNIFICANT CHANGE UP (ref 0–0.5)
EOSINOPHIL NFR BLD AUTO: 1.8 % — SIGNIFICANT CHANGE UP (ref 0–6)
GLUCOSE SERPL-MCNC: 103 MG/DL — HIGH (ref 70–99)
HCT VFR BLD CALC: 38.8 % — LOW (ref 39–50)
HGB BLD-MCNC: 12.4 G/DL — LOW (ref 13–17)
IMM GRANULOCYTES # BLD AUTO: 0.16 # — SIGNIFICANT CHANGE UP
IMM GRANULOCYTES NFR BLD AUTO: 1.8 % — HIGH (ref 0–1.5)
LYMPHOCYTES # BLD AUTO: 0.96 K/UL — LOW (ref 1–3.3)
LYMPHOCYTES # BLD AUTO: 10.5 % — LOW (ref 13–44)
MAGNESIUM SERPL-MCNC: 2 MG/DL — SIGNIFICANT CHANGE UP (ref 1.6–2.6)
MCHC RBC-ENTMCNC: 29 PG — SIGNIFICANT CHANGE UP (ref 27–34)
MCHC RBC-ENTMCNC: 32 % — SIGNIFICANT CHANGE UP (ref 32–36)
MCV RBC AUTO: 90.9 FL — SIGNIFICANT CHANGE UP (ref 80–100)
MONOCYTES # BLD AUTO: 0.83 K/UL — SIGNIFICANT CHANGE UP (ref 0–0.9)
MONOCYTES NFR BLD AUTO: 9.1 % — SIGNIFICANT CHANGE UP (ref 2–14)
NEUTROPHILS # BLD AUTO: 6.89 K/UL — SIGNIFICANT CHANGE UP (ref 1.8–7.4)
NEUTROPHILS NFR BLD AUTO: 75.6 % — SIGNIFICANT CHANGE UP (ref 43–77)
NRBC # FLD: 0 — SIGNIFICANT CHANGE UP
PHOSPHATE SERPL-MCNC: 3.1 MG/DL — SIGNIFICANT CHANGE UP (ref 2.5–4.5)
PLATELET # BLD AUTO: 340 K/UL — SIGNIFICANT CHANGE UP (ref 150–400)
PMV BLD: 9.5 FL — SIGNIFICANT CHANGE UP (ref 7–13)
POTASSIUM SERPL-MCNC: 4.9 MMOL/L — SIGNIFICANT CHANGE UP (ref 3.5–5.3)
POTASSIUM SERPL-SCNC: 4.9 MMOL/L — SIGNIFICANT CHANGE UP (ref 3.5–5.3)
PROT SERPL-MCNC: 7 G/DL — SIGNIFICANT CHANGE UP (ref 6–8.3)
RBC # BLD: 4.27 M/UL — SIGNIFICANT CHANGE UP (ref 4.2–5.8)
RBC # FLD: 14.6 % — HIGH (ref 10.3–14.5)
SODIUM SERPL-SCNC: 136 MMOL/L — SIGNIFICANT CHANGE UP (ref 135–145)
WBC # BLD: 9.11 K/UL — SIGNIFICANT CHANGE UP (ref 3.8–10.5)
WBC # FLD AUTO: 9.11 K/UL — SIGNIFICANT CHANGE UP (ref 3.8–10.5)

## 2018-01-03 PROCEDURE — 99233 SBSQ HOSP IP/OBS HIGH 50: CPT

## 2018-01-03 PROCEDURE — 99231 SBSQ HOSP IP/OBS SF/LOW 25: CPT

## 2018-01-03 RX ADMIN — FOSPHENYTOIN 105 MILLIGRAM(S) PE: 50 INJECTION INTRAMUSCULAR; INTRAVENOUS at 15:10

## 2018-01-03 RX ADMIN — SODIUM CHLORIDE 3 MILLILITER(S): 9 INJECTION INTRAMUSCULAR; INTRAVENOUS; SUBCUTANEOUS at 15:10

## 2018-01-03 RX ADMIN — FOSPHENYTOIN 105 MILLIGRAM(S) PE: 50 INJECTION INTRAMUSCULAR; INTRAVENOUS at 21:51

## 2018-01-03 RX ADMIN — Medication 81 MILLIGRAM(S): at 12:31

## 2018-01-03 RX ADMIN — ATORVASTATIN CALCIUM 80 MILLIGRAM(S): 80 TABLET, FILM COATED ORAL at 21:52

## 2018-01-03 RX ADMIN — HEPARIN SODIUM 5000 UNIT(S): 5000 INJECTION INTRAVENOUS; SUBCUTANEOUS at 05:12

## 2018-01-03 RX ADMIN — DEXTROSE MONOHYDRATE, SODIUM CHLORIDE, AND POTASSIUM CHLORIDE 50 MILLILITER(S): 50; .745; 4.5 INJECTION, SOLUTION INTRAVENOUS at 08:09

## 2018-01-03 RX ADMIN — FOSPHENYTOIN 105 MILLIGRAM(S) PE: 50 INJECTION INTRAMUSCULAR; INTRAVENOUS at 05:12

## 2018-01-03 RX ADMIN — LEVETIRACETAM 400 MILLIGRAM(S): 250 TABLET, FILM COATED ORAL at 20:05

## 2018-01-03 RX ADMIN — LACOSAMIDE 140 MILLIGRAM(S): 50 TABLET ORAL at 05:12

## 2018-01-03 RX ADMIN — HEPARIN SODIUM 5000 UNIT(S): 5000 INJECTION INTRAVENOUS; SUBCUTANEOUS at 17:38

## 2018-01-03 RX ADMIN — SODIUM CHLORIDE 3 MILLILITER(S): 9 INJECTION INTRAMUSCULAR; INTRAVENOUS; SUBCUTANEOUS at 21:55

## 2018-01-03 RX ADMIN — SODIUM CHLORIDE 3 MILLILITER(S): 9 INJECTION INTRAMUSCULAR; INTRAVENOUS; SUBCUTANEOUS at 05:13

## 2018-01-03 RX ADMIN — LEVETIRACETAM 400 MILLIGRAM(S): 250 TABLET, FILM COATED ORAL at 07:56

## 2018-01-03 RX ADMIN — LACOSAMIDE 140 MILLIGRAM(S): 50 TABLET ORAL at 17:38

## 2018-01-03 RX ADMIN — Medication 1 TABLET(S): at 12:31

## 2018-01-03 NOTE — PROGRESS NOTE ADULT - ASSESSMENT
77M admitted 12/15 with seizure, fever.  No LP done or going to be done.  MRI with abnormal signal involves the right cingulate gyrus-medial frontal parietal cortex as described. Some considerations include evolving subacute ischemia, postictal cortical edema, or encephalitis.  [place] serology negative.  At this point, he has completed empiric IV acyclovir and Ceftriaxone.    suggest:  - observe off antimicrobials    Please call Infectious Diseases if there is a change in status.  Thank you.  [phone].

## 2018-01-03 NOTE — PROGRESS NOTE ADULT - PROBLEM SELECTOR PLAN 1
no recurrence of seizures.  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 No epileptic pattern or seizure seen.  MRI brain (12/26) abnormal signal involves the right cingulate gyrus-medial frontal parietal cortex as described. Some considerations include evolving   subacute ischemia, postictal cortical edema, or encephalitis.  c/w current regimen of AEDs (fosphenytoin, keppra, vimpat), f/u dilantin level  c/w ASA 81 mg and lipitor 80mg for possible subacute CVA

## 2018-01-03 NOTE — PROGRESS NOTE ADULT - SUBJECTIVE AND OBJECTIVE BOX
Patient is a 77y old  Male who presents with a chief complaint of Seizure x 1 day (16 Dec 2017 01:26)      SUBJECTIVE / OVERNIGHT EVENTS:  non-verbal, awake     MEDICATIONS  (STANDING):  amLODIPine   Tablet 5 milliGRAM(s) Oral daily  aspirin enteric coated 81 milliGRAM(s) Oral daily  atorvastatin 80 milliGRAM(s) Oral at bedtime  dextrose 5% + sodium chloride 0.9% with potassium chloride 20 mEq/L 1000 milliLiter(s) (50 mL/Hr) IV Continuous <Continuous>  fosphenytoin IVPB 125 milliGRAM(s) PE IV Intermittent every 8 hours  heparin  Injectable 5000 Unit(s) SubCutaneous every 12 hours  lacosamide IVPB 200 milliGRAM(s) IV Intermittent every 12 hours  levETIRAcetam  IVPB 1500 milliGRAM(s) IV Intermittent every 12 hours  lidocaine 1% Injectable 20 milliLiter(s) Local Injection once  multivitamin 1 Tablet(s) Oral daily  sodium chloride 0.9% lock flush 3 milliLiter(s) IV Push every 8 hours    MEDICATIONS  (PRN):  acetaminophen  Suppository 650 milliGRAM(s) Rectal every 6 hours PRN For Temp greater than 38 C (100.4 F)  sodium chloride 0.65% Nasal 1 Spray(s) Both Nostrils four times a day PRN Congestion      T(C): 36.4 (01-03-18 @ 12:20), Max: 37.3 (01-03-18 @ 05:30)  HR: 71 (01-03-18 @ 12:20) (67 - 77)  BP: 147/80 (01-03-18 @ 12:20) (118/68 - 147/80)  RR: 18 (01-03-18 @ 12:20) (17 - 18)  SpO2: 100% (01-03-18 @ 12:20) (98% - 100%)  CAPILLARY BLOOD GLUCOSE        I&O's Summary      PHYSICAL EXAM:  GENERAL: NAD, well-developed  HEAD:  Atraumatic, Normocephalic  EYES: EOMI, PERRLA, conjunctiva and sclera clear  NECK: Supple, No JVD  CHEST/LUNG: Clear to auscultation bilaterally; No wheeze  HEART: s1 s2, regular rhythm and rate   ABDOMEN: Soft, Nontender, Nondistended; Bowel sounds present  EXTREMITIES:  2+ Peripheral Pulses, No clubbing, cyanosis, or edema  PSYCH: AAOx3, calm   NEUROLOGY: non-focal  SKIN: No rashes or lesions    LABS:                        12.4   9.11  )-----------( 340      ( 03 Jan 2018 06:28 )             38.8     01-03    136  |  103  |  13  ----------------------------<  103<H>  4.9   |  21<L>  |  0.80    Ca    8.7      03 Jan 2018 06:28  Phos  3.1     01-03  Mg     2.0     01-03    TPro  7.0  /  Alb  2.3<L>  /  TBili  0.4  /  DBili  x   /  AST  90<H>  /  ALT  79<H>  /  AlkPhos  269<H>  01-03              RADIOLOGY & ADDITIONAL TESTS:    Imaging Personally Reviewed:    Consultant(s) Notes Reviewed:      Care Discussed with Consultants/Other Providers: Patient is a 77y old  Male who presents with a chief complaint of Seizure x 1 day (16 Dec 2017 01:26)      SUBJECTIVE / OVERNIGHT EVENTS:  non-verbal, awake     MEDICATIONS  (STANDING):  amLODIPine   Tablet 5 milliGRAM(s) Oral daily  aspirin enteric coated 81 milliGRAM(s) Oral daily  atorvastatin 80 milliGRAM(s) Oral at bedtime  dextrose 5% + sodium chloride 0.9% with potassium chloride 20 mEq/L 1000 milliLiter(s) (50 mL/Hr) IV Continuous <Continuous>  fosphenytoin IVPB 125 milliGRAM(s) PE IV Intermittent every 8 hours  heparin  Injectable 5000 Unit(s) SubCutaneous every 12 hours  lacosamide IVPB 200 milliGRAM(s) IV Intermittent every 12 hours  levETIRAcetam  IVPB 1500 milliGRAM(s) IV Intermittent every 12 hours  lidocaine 1% Injectable 20 milliLiter(s) Local Injection once  multivitamin 1 Tablet(s) Oral daily  sodium chloride 0.9% lock flush 3 milliLiter(s) IV Push every 8 hours    MEDICATIONS  (PRN):  acetaminophen  Suppository 650 milliGRAM(s) Rectal every 6 hours PRN For Temp greater than 38 C (100.4 F)  sodium chloride 0.65% Nasal 1 Spray(s) Both Nostrils four times a day PRN Congestion      T(C): 36.4 (01-03-18 @ 12:20), Max: 37.3 (01-03-18 @ 05:30)  HR: 71 (01-03-18 @ 12:20) (67 - 77)  BP: 147/80 (01-03-18 @ 12:20) (118/68 - 147/80)  RR: 18 (01-03-18 @ 12:20) (17 - 18)  SpO2: 100% (01-03-18 @ 12:20) (98% - 100%)  CAPILLARY BLOOD GLUCOSE        I&O's Summary      PHYSICAL EXAM  GENERAL: NAD  HEAD:  Atraumatic, Normocephalic  NECK: Supple, No JVD  CHEST/LUNG: Clear to auscultation bilaterally; No wheeze  HEART: Regular rate and rhythm; No murmurs, rubs, or gallops  ABDOMEN: Soft, Nontender, Nondistended; Bowel sounds present  EXTREMITIES:  Contracted, No clubbing, cyanosis, or edema  PSYCH: awake, alert  SKIN: No rashes or lesions    LABS:                        12.4   9.11  )-----------( 340      ( 03 Jan 2018 06:28 )             38.8     01-03    136  |  103  |  13  ----------------------------<  103<H>  4.9   |  21<L>  |  0.80    Ca    8.7      03 Jan 2018 06:28  Phos  3.1     01-03  Mg     2.0     01-03    TPro  7.0  /  Alb  2.3<L>  /  TBili  0.4  /  DBili  x   /  AST  90<H>  /  ALT  79<H>  /  AlkPhos  269<H>  01-03              RADIOLOGY & ADDITIONAL TESTS:    Imaging Personally Reviewed:    Consultant(s) Notes Reviewed:      Care Discussed with Consultants/Other Providers: Patient is a 77y old  Male who presents with a chief complaint of Seizure x 1 day (16 Dec 2017 01:26)      SUBJECTIVE / OVERNIGHT EVENTS:  Pt is awake, appears no distress.     MEDICATIONS  (STANDING):  amLODIPine   Tablet 5 milliGRAM(s) Oral daily  aspirin enteric coated 81 milliGRAM(s) Oral daily  atorvastatin 80 milliGRAM(s) Oral at bedtime  dextrose 5% + sodium chloride 0.9% with potassium chloride 20 mEq/L 1000 milliLiter(s) (50 mL/Hr) IV Continuous <Continuous>  fosphenytoin IVPB 125 milliGRAM(s) PE IV Intermittent every 8 hours  heparin  Injectable 5000 Unit(s) SubCutaneous every 12 hours  lacosamide IVPB 200 milliGRAM(s) IV Intermittent every 12 hours  levETIRAcetam  IVPB 1500 milliGRAM(s) IV Intermittent every 12 hours  lidocaine 1% Injectable 20 milliLiter(s) Local Injection once  multivitamin 1 Tablet(s) Oral daily  sodium chloride 0.9% lock flush 3 milliLiter(s) IV Push every 8 hours    MEDICATIONS  (PRN):  acetaminophen  Suppository 650 milliGRAM(s) Rectal every 6 hours PRN For Temp greater than 38 C (100.4 F)  sodium chloride 0.65% Nasal 1 Spray(s) Both Nostrils four times a day PRN Congestion      T(C): 36.4 (01-03-18 @ 12:20), Max: 37.3 (01-03-18 @ 05:30)  HR: 71 (01-03-18 @ 12:20) (67 - 77)  BP: 147/80 (01-03-18 @ 12:20) (118/68 - 147/80)  RR: 18 (01-03-18 @ 12:20) (17 - 18)  SpO2: 100% (01-03-18 @ 12:20) (98% - 100%)  CAPILLARY BLOOD GLUCOSE        I&O's Summary      PHYSICAL EXAM  GENERAL: NAD  HEAD:  Atraumatic, Normocephalic  NECK: Supple, No JVD  CHEST/LUNG: Clear to auscultation bilaterally; No wheeze  HEART: Regular rate and rhythm; No murmurs, rubs, or gallops  ABDOMEN: Soft, Nontender, Nondistended; Bowel sounds present  EXTREMITIES:  Contracted, No clubbing, cyanosis, or edema  PSYCH: awake, alert  SKIN: No rashes or lesions    LABS:                        12.4   9.11  )-----------( 340      ( 03 Jan 2018 06:28 )             38.8     01-03    136  |  103  |  13  ----------------------------<  103<H>  4.9   |  21<L>  |  0.80    Ca    8.7      03 Jan 2018 06:28  Phos  3.1     01-03  Mg     2.0     01-03    TPro  7.0  /  Alb  2.3<L>  /  TBili  0.4  /  DBili  x   /  AST  90<H>  /  ALT  79<H>  /  AlkPhos  269<H>  01-03              RADIOLOGY & ADDITIONAL TESTS:    Imaging Personally Reviewed:    Consultant(s) Notes Reviewed:      Care Discussed with Consultants/Other Providers:

## 2018-01-03 NOTE — PROGRESS NOTE ADULT - SUBJECTIVE AND OBJECTIVE BOX
Follow Up:  concern for meningtis/encephalitis    Interval History/ROS:  awake, non-toxic but still confused.  denies headache.  no seizures overnight.  no fever.  Unable to obtain ROS - patient confused.    Allergies  No Known Allergies    ANTIMICROBIALS:  none    MEDICATIONS  (STANDING):  acetaminophen  Suppository 650 every 6 hours PRN  amLODIPine   Tablet 5 daily  aspirin enteric coated 81 daily  atorvastatin 80 at bedtime  fosphenytoin IVPB 125 every 8 hours  heparin  Injectable 5000 every 12 hours  lacosamide IVPB 200 every 12 hours  levETIRAcetam  IVPB 1500 every 12 hours    Vital Signs Last 24 Hrs  T(F): 99.1 (01-03-18 @ 05:30), Max: 99.9 (01-02-18 @ 12:56)  HR: 67 (01-03-18 @ 05:30)  BP: 118/68 (01-03-18 @ 05:30)  RR: 17 (01-03-18 @ 05:30)  SpO2: 98% (01-03-18 @ 05:30) (96% - 100%)  Wt(kg): --    PHYSICAL EXAM:  General: non-toxic, lying comfortably in bed  HEAD/EYES: anicteric  ENT:  supple  Cardiovascular:   S1, S2  Respiratory:  clear bilaterally  GI:  soft, non-tender, normal bowel sounds  :  no CVA tenderness   Musculoskeletal:  no synovitis  Neurologic:  not oriented; seems weaker on left  Skin:  no rash  Lymph: no lymphadenopathy  Psychiatric:  appropriate affect but confused; not oriented  Vascular:  no phlebitis    WBC Count: 9.11 (01-03-18 @ 06:28)  WBC Count: 14.51 (01-02-18 @ 06:03)  WBC Count: 12.33 (01-01-18 @ 05:15)  WBC Count: 13.30 (12-30-17 @ 05:34)  WBC Count: 14.58 (12-29-17 @ 16:15)                       12.4   9.11  )-----------( 340      ( 03 Jan 2018 06:28 )             38.8 01-03    136  |  103  |  13  ----------------------------<  103  4.9   |  21  |  0.80  Ca    8.7      03 Jan 2018 06:28Phos  3.1     01-03Mg     2.0     01-03  TPro  7.0  /  Alb  2.3  /  TBili  0.4  /  DBili  x   /  AST  90  /  ALT  79  /  AlkPhos  269  01-03    MICROBIOLOGY:  Culture - Urine (12.22.17 @ 20:15)    Culture - Urine:   NO GROWTH AT 24 HOURS    Specimen Source: URINE MIDSTREAM    Culture - Blood (12.22.17 @ 20:11)    Culture - Blood:   NO ORGANISMS ISOLATED    Specimen Source: BLOOD VENOUS    RADIOLOGY:  MR Head No Cont (12.26.17 @ 11:05)  IMPRESSION:  Abnormal signal involves the right cingulate gyrus-medial frontal parietal cortex as described. Some considerations include evolving subacute ischemia, postictal cortical edema, or encephalitis.  A 1.8 cm x 1 cm nonspecific right parietal scalp lesion is present at the vertex. Although this could reflect a sebaceous cyst, correlate with direct visualization findings to exclude other etiologies for scalp lesions.

## 2018-01-03 NOTE — CHART NOTE - NSCHARTNOTEFT_GEN_A_CORE
GI CHART NOTE    Speech/swallow evaluation reviewed.     Will plan for PEG tomorrow.    NPO past midnight tonight. Early AM labs tomorrow.

## 2018-01-04 PROCEDURE — 43246 EGD PLACE GASTROSTOMY TUBE: CPT | Mod: GC

## 2018-01-04 PROCEDURE — 99222 1ST HOSP IP/OBS MODERATE 55: CPT | Mod: 25,GC

## 2018-01-04 PROCEDURE — 99233 SBSQ HOSP IP/OBS HIGH 50: CPT

## 2018-01-04 RX ORDER — CEFAZOLIN SODIUM 1 G
2000 VIAL (EA) INJECTION EVERY 8 HOURS
Qty: 0 | Refills: 0 | Status: DISCONTINUED | OUTPATIENT
Start: 2018-01-04 | End: 2018-01-09

## 2018-01-04 RX ORDER — BACITRACIN ZINC 500 UNIT/G
1 OINTMENT IN PACKET (EA) TOPICAL DAILY
Qty: 0 | Refills: 0 | Status: COMPLETED | OUTPATIENT
Start: 2018-01-04 | End: 2018-01-11

## 2018-01-04 RX ORDER — CEFAZOLIN SODIUM 1 G
2000 VIAL (EA) INJECTION ONCE
Qty: 0 | Refills: 0 | Status: COMPLETED | OUTPATIENT
Start: 2018-01-04 | End: 2018-01-04

## 2018-01-04 RX ORDER — LACOSAMIDE 50 MG/1
200 TABLET ORAL EVERY 12 HOURS
Qty: 0 | Refills: 0 | Status: DISCONTINUED | OUTPATIENT
Start: 2018-01-04 | End: 2018-01-09

## 2018-01-04 RX ORDER — CEFAZOLIN SODIUM 1 G
VIAL (EA) INJECTION
Qty: 0 | Refills: 0 | Status: DISCONTINUED | OUTPATIENT
Start: 2018-01-04 | End: 2018-01-09

## 2018-01-04 RX ADMIN — FOSPHENYTOIN 105 MILLIGRAM(S) PE: 50 INJECTION INTRAMUSCULAR; INTRAVENOUS at 18:07

## 2018-01-04 RX ADMIN — LEVETIRACETAM 400 MILLIGRAM(S): 250 TABLET, FILM COATED ORAL at 09:02

## 2018-01-04 RX ADMIN — LACOSAMIDE 140 MILLIGRAM(S): 50 TABLET ORAL at 06:32

## 2018-01-04 RX ADMIN — DEXTROSE MONOHYDRATE, SODIUM CHLORIDE, AND POTASSIUM CHLORIDE 50 MILLILITER(S): 50; .745; 4.5 INJECTION, SOLUTION INTRAVENOUS at 05:34

## 2018-01-04 RX ADMIN — SODIUM CHLORIDE 3 MILLILITER(S): 9 INJECTION INTRAMUSCULAR; INTRAVENOUS; SUBCUTANEOUS at 21:31

## 2018-01-04 RX ADMIN — HEPARIN SODIUM 5000 UNIT(S): 5000 INJECTION INTRAVENOUS; SUBCUTANEOUS at 06:16

## 2018-01-04 RX ADMIN — FOSPHENYTOIN 105 MILLIGRAM(S) PE: 50 INJECTION INTRAMUSCULAR; INTRAVENOUS at 05:33

## 2018-01-04 RX ADMIN — SODIUM CHLORIDE 3 MILLILITER(S): 9 INJECTION INTRAMUSCULAR; INTRAVENOUS; SUBCUTANEOUS at 18:14

## 2018-01-04 RX ADMIN — LACOSAMIDE 140 MILLIGRAM(S): 50 TABLET ORAL at 18:11

## 2018-01-04 RX ADMIN — ATORVASTATIN CALCIUM 80 MILLIGRAM(S): 80 TABLET, FILM COATED ORAL at 21:27

## 2018-01-04 RX ADMIN — DEXTROSE MONOHYDRATE, SODIUM CHLORIDE, AND POTASSIUM CHLORIDE 50 MILLILITER(S): 50; .745; 4.5 INJECTION, SOLUTION INTRAVENOUS at 21:26

## 2018-01-04 RX ADMIN — Medication 100 MILLIGRAM(S): at 21:32

## 2018-01-04 RX ADMIN — SODIUM CHLORIDE 3 MILLILITER(S): 9 INJECTION INTRAMUSCULAR; INTRAVENOUS; SUBCUTANEOUS at 06:14

## 2018-01-04 RX ADMIN — LEVETIRACETAM 400 MILLIGRAM(S): 250 TABLET, FILM COATED ORAL at 20:13

## 2018-01-04 RX ADMIN — AMLODIPINE BESYLATE 5 MILLIGRAM(S): 2.5 TABLET ORAL at 06:17

## 2018-01-04 RX ADMIN — HEPARIN SODIUM 5000 UNIT(S): 5000 INJECTION INTRAVENOUS; SUBCUTANEOUS at 18:11

## 2018-01-04 RX ADMIN — Medication 100 MILLIGRAM(S): at 13:35

## 2018-01-04 NOTE — PROGRESS NOTE ADULT - PROBLEM SELECTOR PLAN 2
resolved. s/p empiric abx and acyclovir   observe off abx per ID resolved. s/p empiric abx and acyclovir   observe off abx per ID  leucocytosis resolved

## 2018-01-04 NOTE — CHART NOTE - NSCHARTNOTEFT_GEN_A_CORE
Pt seen and examined. Plan discussed with patient and primary team.     Patient had chief complaint of dysphagia    Failed speech and swallow  CVA  Dementia    Request for PEG tube.     Risks/benefits/alternatives explained to step daughter Chelita who gives consent. Risks not limited to increased morbidity, continued aspiration, no extension of life span.  Perforation, anesthesia complications, inability to place PEG, bleeding, and infection are also risks. DNR rescinded just for procedure.     25min spent in patient care, >50% in care coordination counseling discussing management with Chelita. Pt seen and examined. Plan discussed with patient and primary team.     Patient had chief complaint of dysphagia    Failed speech and swallow  CVA  Dementia    Request for PEG tube.     WBC 10  Afebrile    Risks/benefits/alternatives explained to step daughter Chelita who gives consent. Risks not limited to increased morbidity, continued aspiration, no extension of life span.  Perforation, anesthesia complications, inability to place PEG, bleeding, and infection are also risks. DNR rescinded just for procedure.     25min spent in patient care, >50% in care coordination counseling discussing management with Chelita.

## 2018-01-04 NOTE — PROGRESS NOTE ADULT - ASSESSMENT
78 yo M with dementia (unclear baseline), p/w episode of obtundation with course c/b seizures x 2 in the ED that initially resolved with Ativan. 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 Hospital course now complicated by fever, SIRS, cannot r/o meningitis vs encephalitis. 77 yr old  male  with dementia (unclear baseline), p/w episode of obtundation with course c/b seizures x 2 in the ED that initially resolved with Ativan. 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 Hospital course now complicated by fever, SIRS, cannot r/o meningitis vs encephalitis.

## 2018-01-04 NOTE — PROGRESS NOTE ADULT - SUBJECTIVE AND OBJECTIVE BOX
Patient is a 77y old  Male who presents with a chief complaint of Seizure x 1 day (16 Dec 2017 01:26)      SUBJECTIVE / OVERNIGHT EVENTS:    MEDICATIONS  (STANDING):  amLODIPine   Tablet 5 milliGRAM(s) Oral daily  aspirin enteric coated 81 milliGRAM(s) Oral daily  atorvastatin 80 milliGRAM(s) Oral at bedtime  ceFAZolin   IVPB      ceFAZolin   IVPB 2000 milliGRAM(s) IV Intermittent every 8 hours  dextrose 5% + sodium chloride 0.9% with potassium chloride 20 mEq/L 1000 milliLiter(s) (50 mL/Hr) IV Continuous <Continuous>  fosphenytoin IVPB 125 milliGRAM(s) PE IV Intermittent every 8 hours  heparin  Injectable 5000 Unit(s) SubCutaneous every 12 hours  lacosamide IVPB 200 milliGRAM(s) IV Intermittent every 12 hours  levETIRAcetam  IVPB 1500 milliGRAM(s) IV Intermittent every 12 hours  lidocaine 1% Injectable 20 milliLiter(s) Local Injection once  multivitamin 1 Tablet(s) Oral daily  sodium chloride 0.9% lock flush 3 milliLiter(s) IV Push every 8 hours    MEDICATIONS  (PRN):  acetaminophen  Suppository 650 milliGRAM(s) Rectal every 6 hours PRN For Temp greater than 38 C (100.4 F)  sodium chloride 0.65% Nasal 1 Spray(s) Both Nostrils four times a day PRN Congestion      Vital Signs Last 24 Hrs  T(C): 37.2 (04 Jan 2018 12:00), Max: 37.2 (04 Jan 2018 12:00)  T(F): 98.9 (04 Jan 2018 12:00), Max: 98.9 (04 Jan 2018 12:00)  HR: 68 (04 Jan 2018 12:00) (68 - 78)  BP: 144/77 (04 Jan 2018 12:00) (133/80 - 148/76)  BP(mean): --  RR: 16 (04 Jan 2018 12:00) (16 - 20)  SpO2: 95% (04 Jan 2018 12:00) (95% - 97%)  CAPILLARY BLOOD GLUCOSE        I&O's Summary        PHYSICAL EXAM  GENERAL: NAD  HEAD:  Atraumatic, Normocephalic  NECK: Supple, No JVD  CHEST/LUNG: Clear to auscultation bilaterally; No wheeze  HEART: Regular rate and rhythm; No murmurs, rubs, or gallops  ABDOMEN: Soft, Nontender, Nondistended; Bowel sounds present  EXTREMITIES:  Contracted, No clubbing, cyanosis, or edema  PSYCH: awake, alert  SKIN: No rashes or lesions  LABS:                        12.4   9.11  )-----------( 340      ( 03 Jan 2018 06:28 )             38.8     01-03    136  |  103  |  13  ----------------------------<  103<H>  4.9   |  21<L>  |  0.80    Ca    8.7      03 Jan 2018 06:28  Phos  3.1     01-03  Mg     2.0     01-03    TPro  7.0  /  Alb  2.3<L>  /  TBili  0.4  /  DBili  x   /  AST  90<H>  /  ALT  79<H>  /  AlkPhos  269<H>  01-03              RADIOLOGY & ADDITIONAL TESTS:    Imaging Personally Reviewed:    Consultant(s) Notes Reviewed:      Care Discussed with Consultants/Other Providers: Patient is a 77y old  Male who presents with a chief complaint of Seizure x 1 day (16 Dec 2017 01:26)      SUBJECTIVE / OVERNIGHT EVENTS: patient seen and examined by bedside,       MEDICATIONS  (STANDING):  amLODIPine   Tablet 5 milliGRAM(s) Oral daily  aspirin enteric coated 81 milliGRAM(s) Oral daily  atorvastatin 80 milliGRAM(s) Oral at bedtime  ceFAZolin   IVPB      ceFAZolin   IVPB 2000 milliGRAM(s) IV Intermittent every 8 hours  dextrose 5% + sodium chloride 0.9% with potassium chloride 20 mEq/L 1000 milliLiter(s) (50 mL/Hr) IV Continuous <Continuous>  fosphenytoin IVPB 125 milliGRAM(s) PE IV Intermittent every 8 hours  heparin  Injectable 5000 Unit(s) SubCutaneous every 12 hours  lacosamide IVPB 200 milliGRAM(s) IV Intermittent every 12 hours  levETIRAcetam  IVPB 1500 milliGRAM(s) IV Intermittent every 12 hours  lidocaine 1% Injectable 20 milliLiter(s) Local Injection once  multivitamin 1 Tablet(s) Oral daily  sodium chloride 0.9% lock flush 3 milliLiter(s) IV Push every 8 hours    MEDICATIONS  (PRN):  acetaminophen  Suppository 650 milliGRAM(s) Rectal every 6 hours PRN For Temp greater than 38 C (100.4 F)  sodium chloride 0.65% Nasal 1 Spray(s) Both Nostrils four times a day PRN Congestion      Vital Signs Last 24 Hrs  T(C): 37.2 (04 Jan 2018 12:00), Max: 37.2 (04 Jan 2018 12:00)  T(F): 98.9 (04 Jan 2018 12:00), Max: 98.9 (04 Jan 2018 12:00)  HR: 68 (04 Jan 2018 12:00) (68 - 78)  BP: 144/77 (04 Jan 2018 12:00) (133/80 - 148/76)  BP(mean): --  RR: 16 (04 Jan 2018 12:00) (16 - 20)  SpO2: 95% (04 Jan 2018 12:00) (95% - 97%)  CAPILLARY BLOOD GLUCOSE        I&O's Summary      PHYSICAL EXAM  GENERAL: NAD  HEAD:  Atraumatic, Normocephalic  NECK: Supple, No JVD  CHEST/LUNG: Clear to auscultation bilaterally; No wheeze  HEART: Regular rate and rhythm; No murmurs, rubs, or gallops  ABDOMEN: Soft, Nontender, Nondistended; Bowel sounds present  EXTREMITIES:  Contracted, No clubbing, cyanosis, or edema  PSYCH: awake, alert  SKIN: No rashes or lesions    LABS:                        12.4   9.11  )-----------( 340      ( 03 Jan 2018 06:28 )             38.8     01-03    136  |  103  |  13  ----------------------------<  103<H>  4.9   |  21<L>  |  0.80    Ca    8.7      03 Jan 2018 06:28  Phos  3.1     01-03  Mg     2.0     01-03    TPro  7.0  /  Alb  2.3<L>  /  TBili  0.4  /  DBili  x   /  AST  90<H>  /  ALT  79<H>  /  AlkPhos  269<H>  01-03              RADIOLOGY & ADDITIONAL TESTS:    Imaging Personally Reviewed:    Consultant(s) Notes Reviewed: GI     Care Discussed with Consultants/Other Providers: Patient is a 77y old  Male who presents with a chief complaint of Seizure x 1 day (16 Dec 2017 01:26)      SUBJECTIVE / OVERNIGHT EVENTS: patient seen and examined by bedside, no acute distress noted        MEDICATIONS  (STANDING):  amLODIPine   Tablet 5 milliGRAM(s) Oral daily  aspirin enteric coated 81 milliGRAM(s) Oral daily  atorvastatin 80 milliGRAM(s) Oral at bedtime  ceFAZolin   IVPB      ceFAZolin   IVPB 2000 milliGRAM(s) IV Intermittent every 8 hours  dextrose 5% + sodium chloride 0.9% with potassium chloride 20 mEq/L 1000 milliLiter(s) (50 mL/Hr) IV Continuous <Continuous>  fosphenytoin IVPB 125 milliGRAM(s) PE IV Intermittent every 8 hours  heparin  Injectable 5000 Unit(s) SubCutaneous every 12 hours  lacosamide IVPB 200 milliGRAM(s) IV Intermittent every 12 hours  levETIRAcetam  IVPB 1500 milliGRAM(s) IV Intermittent every 12 hours  lidocaine 1% Injectable 20 milliLiter(s) Local Injection once  multivitamin 1 Tablet(s) Oral daily  sodium chloride 0.9% lock flush 3 milliLiter(s) IV Push every 8 hours    MEDICATIONS  (PRN):  acetaminophen  Suppository 650 milliGRAM(s) Rectal every 6 hours PRN For Temp greater than 38 C (100.4 F)  sodium chloride 0.65% Nasal 1 Spray(s) Both Nostrils four times a day PRN Congestion      Vital Signs Last 24 Hrs  T(C): 37.2 (04 Jan 2018 12:00), Max: 37.2 (04 Jan 2018 12:00)  T(F): 98.9 (04 Jan 2018 12:00), Max: 98.9 (04 Jan 2018 12:00)  HR: 68 (04 Jan 2018 12:00) (68 - 78)  BP: 144/77 (04 Jan 2018 12:00) (133/80 - 148/76)  BP(mean): --  RR: 16 (04 Jan 2018 12:00) (16 - 20)  SpO2: 95% (04 Jan 2018 12:00) (95% - 97%)  CAPILLARY BLOOD GLUCOSE        I&O's Summary      PHYSICAL EXAM  GENERAL: NAD  HEAD:  Atraumatic, Normocephalic  NECK: Supple, No JVD  CHEST/LUNG: Clear to auscultation bilaterally; No wheeze  HEART: Regular rate and rhythm; No murmurs, rubs, or gallops  ABDOMEN: Soft, Nontender, Nondistended; Bowel sounds present  EXTREMITIES:  Contracted, No clubbing, cyanosis, or edema  PSYCH: awake, alert  SKIN: No rashes or lesions    LABS:                        12.4   9.11  )-----------( 340      ( 03 Jan 2018 06:28 )             38.8     01-03    136  |  103  |  13  ----------------------------<  103<H>  4.9   |  21<L>  |  0.80    Ca    8.7      03 Jan 2018 06:28  Phos  3.1     01-03  Mg     2.0     01-03    TPro  7.0  /  Alb  2.3<L>  /  TBili  0.4  /  DBili  x   /  AST  90<H>  /  ALT  79<H>  /  AlkPhos  269<H>  01-03              RADIOLOGY & ADDITIONAL TESTS:    Imaging Personally Reviewed:    Consultant(s) Notes Reviewed: GI     Care Discussed with Consultants/Other Providers:

## 2018-01-05 LAB
ALBUMIN SERPL ELPH-MCNC: 2.4 G/DL — LOW (ref 3.3–5)
ALP SERPL-CCNC: 267 U/L — HIGH (ref 40–120)
ALT FLD-CCNC: 41 U/L — SIGNIFICANT CHANGE UP (ref 4–41)
AST SERPL-CCNC: 72 U/L — HIGH (ref 4–40)
BILIRUB SERPL-MCNC: 0.4 MG/DL — SIGNIFICANT CHANGE UP (ref 0.2–1.2)
BUN SERPL-MCNC: 17 MG/DL — SIGNIFICANT CHANGE UP (ref 7–23)
CALCIUM SERPL-MCNC: 8.9 MG/DL — SIGNIFICANT CHANGE UP (ref 8.4–10.5)
CHLORIDE SERPL-SCNC: 100 MMOL/L — SIGNIFICANT CHANGE UP (ref 98–107)
CO2 SERPL-SCNC: 19 MMOL/L — LOW (ref 22–31)
CREAT SERPL-MCNC: 0.75 MG/DL — SIGNIFICANT CHANGE UP (ref 0.5–1.3)
GLUCOSE SERPL-MCNC: 86 MG/DL — SIGNIFICANT CHANGE UP (ref 70–99)
HCT VFR BLD CALC: 36.2 % — LOW (ref 39–50)
HGB BLD-MCNC: 12.2 G/DL — LOW (ref 13–17)
MCHC RBC-ENTMCNC: 30 PG — SIGNIFICANT CHANGE UP (ref 27–34)
MCHC RBC-ENTMCNC: 33.7 % — SIGNIFICANT CHANGE UP (ref 32–36)
MCV RBC AUTO: 89.2 FL — SIGNIFICANT CHANGE UP (ref 80–100)
NRBC # FLD: 0 — SIGNIFICANT CHANGE UP
PLATELET # BLD AUTO: 518 K/UL — HIGH (ref 150–400)
PMV BLD: 8.5 FL — SIGNIFICANT CHANGE UP (ref 7–13)
POTASSIUM SERPL-MCNC: 4.6 MMOL/L — SIGNIFICANT CHANGE UP (ref 3.5–5.3)
POTASSIUM SERPL-SCNC: 4.6 MMOL/L — SIGNIFICANT CHANGE UP (ref 3.5–5.3)
PROT SERPL-MCNC: 7.3 G/DL — SIGNIFICANT CHANGE UP (ref 6–8.3)
RBC # BLD: 4.06 M/UL — LOW (ref 4.2–5.8)
RBC # FLD: 14.4 % — SIGNIFICANT CHANGE UP (ref 10.3–14.5)
SODIUM SERPL-SCNC: 135 MMOL/L — SIGNIFICANT CHANGE UP (ref 135–145)
WBC # BLD: 14.19 K/UL — HIGH (ref 3.8–10.5)
WBC # FLD AUTO: 14.19 K/UL — HIGH (ref 3.8–10.5)

## 2018-01-05 PROCEDURE — 99233 SBSQ HOSP IP/OBS HIGH 50: CPT

## 2018-01-05 PROCEDURE — 88342 IMHCHEM/IMCYTCHM 1ST ANTB: CPT | Mod: 26

## 2018-01-05 PROCEDURE — 88305 TISSUE EXAM BY PATHOLOGIST: CPT | Mod: 26

## 2018-01-05 PROCEDURE — 99231 SBSQ HOSP IP/OBS SF/LOW 25: CPT | Mod: GC

## 2018-01-05 PROCEDURE — 88312 SPECIAL STAINS GROUP 1: CPT | Mod: 26

## 2018-01-05 RX ADMIN — Medication 100 MILLIGRAM(S): at 14:52

## 2018-01-05 RX ADMIN — DEXTROSE MONOHYDRATE, SODIUM CHLORIDE, AND POTASSIUM CHLORIDE 50 MILLILITER(S): 50; .745; 4.5 INJECTION, SOLUTION INTRAVENOUS at 18:11

## 2018-01-05 RX ADMIN — Medication 100 MILLIGRAM(S): at 05:12

## 2018-01-05 RX ADMIN — FOSPHENYTOIN 105 MILLIGRAM(S) PE: 50 INJECTION INTRAMUSCULAR; INTRAVENOUS at 10:08

## 2018-01-05 RX ADMIN — LEVETIRACETAM 400 MILLIGRAM(S): 250 TABLET, FILM COATED ORAL at 10:03

## 2018-01-05 RX ADMIN — Medication 1 APPLICATION(S): at 14:51

## 2018-01-05 RX ADMIN — ATORVASTATIN CALCIUM 80 MILLIGRAM(S): 80 TABLET, FILM COATED ORAL at 21:46

## 2018-01-05 RX ADMIN — HEPARIN SODIUM 5000 UNIT(S): 5000 INJECTION INTRAVENOUS; SUBCUTANEOUS at 18:11

## 2018-01-05 RX ADMIN — AMLODIPINE BESYLATE 5 MILLIGRAM(S): 2.5 TABLET ORAL at 05:12

## 2018-01-05 RX ADMIN — Medication 1 TABLET(S): at 14:52

## 2018-01-05 RX ADMIN — HEPARIN SODIUM 5000 UNIT(S): 5000 INJECTION INTRAVENOUS; SUBCUTANEOUS at 05:12

## 2018-01-05 RX ADMIN — Medication 100 MILLIGRAM(S): at 21:46

## 2018-01-05 RX ADMIN — Medication 650 MILLIGRAM(S): at 22:59

## 2018-01-05 RX ADMIN — Medication 81 MILLIGRAM(S): at 14:52

## 2018-01-05 RX ADMIN — LEVETIRACETAM 400 MILLIGRAM(S): 250 TABLET, FILM COATED ORAL at 21:46

## 2018-01-05 RX ADMIN — LACOSAMIDE 140 MILLIGRAM(S): 50 TABLET ORAL at 05:11

## 2018-01-05 RX ADMIN — SODIUM CHLORIDE 3 MILLILITER(S): 9 INJECTION INTRAMUSCULAR; INTRAVENOUS; SUBCUTANEOUS at 13:05

## 2018-01-05 RX ADMIN — LACOSAMIDE 140 MILLIGRAM(S): 50 TABLET ORAL at 18:11

## 2018-01-05 RX ADMIN — SODIUM CHLORIDE 3 MILLILITER(S): 9 INJECTION INTRAMUSCULAR; INTRAVENOUS; SUBCUTANEOUS at 21:47

## 2018-01-05 RX ADMIN — FOSPHENYTOIN 105 MILLIGRAM(S) PE: 50 INJECTION INTRAMUSCULAR; INTRAVENOUS at 01:43

## 2018-01-05 RX ADMIN — SODIUM CHLORIDE 3 MILLILITER(S): 9 INJECTION INTRAMUSCULAR; INTRAVENOUS; SUBCUTANEOUS at 05:12

## 2018-01-05 RX ADMIN — FOSPHENYTOIN 105 MILLIGRAM(S) PE: 50 INJECTION INTRAMUSCULAR; INTRAVENOUS at 18:11

## 2018-01-05 NOTE — CHART NOTE - NSCHARTNOTEFT_GEN_A_CORE
=========================>>>> N U T R I T I O N   F O L L O W - U P   N O T E <<<<=========================    Source: Patient [ X ]    Family [ ]     other [ X ] ADS    Nutrition reconsulted for tube feeding recommendations.   S/p new PEG placement yesterday, patient ok to start PEG feeds today per ADS. Note s/p SLP bedside eval 1/2 and MBS 12/29- non-oral means of nutrition support were recommended.       CURRENT DIET : NPO except meds      _______WEIGHTS:________  Admit (12/14) 141# / 64 kg   Current (1/5) 149#/ 67.6kg     EDEMA: none   SKIN: right lower back skin tear; no pressure injuries noted       _______PERTINENT MEDICATIONS:________     amLODIPine   Tablet  atorvastatin  dextrose 5% + sodium chloride 0.9% with potassium chloride 20 mEq/L  multivitamin      _______PERTINENT LABS:_________  Glucose 103H   Albumin 2.3L  Hemoglobin A1C, Whole Blood: 5.5 % [4.0 - 5.6] (12-16-17 @ 02:40)      Estimated Needs:   [ x ] recalculated: based on current 149#/ 67.6kg (which is also consistent with # (+/-10%)   Estimated Energy Needs (25-30 kcal/kg) ~1692- 2028 kcal/day   Estimated Protein Needs (1.2- 1.4g protein/kg) ~81- 94g protein/day       Previous Nutrition Diagnosis:   [ X ] Malnutrition, severe  [ X ] Inadequate Oral Intake   Nutrition Diagnosis is [ X ] ongoing  [ ] resolved [ ] not applicable         =================>>>  A D D I T I O N A L  R E C O M M E N D A T I O N S  <<<===========    1) Recommend initiate Jevity 1.2 @20ml/hr. Increase rate by 15ml every 4 hours until goal rate of 63ml/hr is reached. At goal rate this will provide a total volume of 1512ml, 1892 kcal, 83g protein to meet estimated nutritional needs     2) Further adjustments to rate/volume/duration/free water provision of enteral feeds will be determined in accordance with long term patient tolerance, needs and weight trends.       Rupa Hurtado, LINDAN, CDN, CDE (Pager [phone])

## 2018-01-05 NOTE — PROGRESS NOTE ADULT - ASSESSMENT
77 yr old  male  with dementia (unclear baseline), p/w episode of obtundation with course c/b seizures x 2 in the ED that initially resolved with Ativan. 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 Hospital course now complicated by fever, SIRS, cannot r/o meningitis vs encephalitis.

## 2018-01-05 NOTE — PROGRESS NOTE ADULT - PROBLEM SELECTOR PLAN 1
no recurrence of seizures.  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 No epileptic pattern or seizure seen.  MRI brain (12/26) abnormal signal involves the right cingulate gyrus-medial frontal parietal cortex as described. Some considerations include evolving   subacute ischemia, postictal cortical edema, or encephalitis.  c/w current regimen of AEDs (fosphenytoin, keppra, vimpat), f/u dilantin level  c/w ASA 81 mg and lipitor 80mg for possible subacute CVA  SZ precaution

## 2018-01-05 NOTE — PROGRESS NOTE ADULT - SUBJECTIVE AND OBJECTIVE BOX
Patient is a 77y old  Male who presents with a chief complaint of Seizure x 1 day (16 Dec 2017 01:26)      SUBJECTIVE / OVERNIGHT EVENTS: patient seen and examined by bedside, no acute distress noted, s/p peg   placement yesterday       MEDICATIONS  (STANDING):  amLODIPine   Tablet 5 milliGRAM(s) Oral daily  aspirin enteric coated 81 milliGRAM(s) Oral daily  atorvastatin 80 milliGRAM(s) Oral at bedtime  BACItracin   Ointment 1 Application(s) Topical daily  ceFAZolin   IVPB      ceFAZolin   IVPB 2000 milliGRAM(s) IV Intermittent every 8 hours  dextrose 5% + sodium chloride 0.9% with potassium chloride 20 mEq/L 1000 milliLiter(s) (50 mL/Hr) IV Continuous <Continuous>  fosphenytoin IVPB 125 milliGRAM(s) PE IV Intermittent every 8 hours  heparin  Injectable 5000 Unit(s) SubCutaneous every 12 hours  lacosamide IVPB 200 milliGRAM(s) IV Intermittent every 12 hours  levETIRAcetam  IVPB 1500 milliGRAM(s) IV Intermittent every 12 hours  lidocaine 1% Injectable 20 milliLiter(s) Local Injection once  multivitamin 1 Tablet(s) Oral daily  sodium chloride 0.9% lock flush 3 milliLiter(s) IV Push every 8 hours    MEDICATIONS  (PRN):  acetaminophen  Suppository 650 milliGRAM(s) Rectal every 6 hours PRN For Temp greater than 38 C (100.4 F)  sodium chloride 0.65% Nasal 1 Spray(s) Both Nostrils four times a day PRN Congestion      Vital Signs Last 24 Hrs  T(C): 36.9 (05 Jan 2018 04:48), Max: 36.9 (05 Jan 2018 04:48)  T(F): 98.5 (05 Jan 2018 04:48), Max: 98.5 (05 Jan 2018 04:48)  HR: 68 (05 Jan 2018 06:13) (67 - 72)  BP: 152/77 (05 Jan 2018 06:13) (149/79 - 170/90)  BP(mean): --  RR: 18 (05 Jan 2018 04:48) (17 - 18)  SpO2: 97% (05 Jan 2018 04:48) (97% - 98%)  CAPILLARY BLOOD GLUCOSE        I&O's Summary      PHYSICAL EXAM  GENERAL: NAD  HEAD:  Atraumatic, Normocephalic  NECK: Supple, No JVD  CHEST/LUNG: Clear to auscultation bilaterally; No wheeze  HEART: Regular rate and rhythm; No murmurs, rubs, or gallops  ABDOMEN: Soft, Nontender, Nondistended; Bowel sounds present, PEG +  EXTREMITIES:  Contracted, No clubbing, cyanosis, or edema  PSYCH: awake, alert  SKIN: No rashes or lesions    LABS:      no new labs               RADIOLOGY & ADDITIONAL TESTS:    Imaging Personally Reviewed:    Consultant(s) Notes Reviewed:  GI    Care Discussed with Consultants/Other Providers:

## 2018-01-05 NOTE — PROGRESS NOTE ADULT - ASSESSMENT
Impression:  1) Dysphagia s/p PEG placement    Plan:  - ok to use PEG today for feeds  - TF formula per nutrition  - PEG care per nursing  - call with questions Impression:  1) Dysphagia s/p PEG placement    Plan:  - ok to use PEG today for feeds  - TF formula per nutrition  - PEG care per nursing  - call with questions  - consider hepatology consult for transaminase elevation  - outpt GI f/u as needed. [phone]

## 2018-01-05 NOTE — PROGRESS NOTE ADULT - SUBJECTIVE AND OBJECTIVE BOX
GASTROENTEROLOGY FOLLOW-UP NOTE    Chief Complaint:  Patient is a 77y old  Male who presents with a chief complaint of Seizure x 1 day (16 Dec 2017 01:26)      Interval Events:   - PEG placed yesterday  - No events overnight; afebrile.    Allergies:  No Known Allergies      Hospital Medications:  acetaminophen  Suppository 650 milliGRAM(s) Rectal every 6 hours PRN  amLODIPine   Tablet 5 milliGRAM(s) Oral daily  aspirin enteric coated 81 milliGRAM(s) Oral daily  atorvastatin 80 milliGRAM(s) Oral at bedtime  BACItracin   Ointment 1 Application(s) Topical daily  ceFAZolin   IVPB      ceFAZolin   IVPB 2000 milliGRAM(s) IV Intermittent every 8 hours  dextrose 5% + sodium chloride 0.9% with potassium chloride 20 mEq/L 1000 milliLiter(s) IV Continuous <Continuous>  fosphenytoin IVPB 125 milliGRAM(s) PE IV Intermittent every 8 hours  heparin  Injectable 5000 Unit(s) SubCutaneous every 12 hours  lacosamide IVPB 200 milliGRAM(s) IV Intermittent every 12 hours  levETIRAcetam  IVPB 1500 milliGRAM(s) IV Intermittent every 12 hours  lidocaine 1% Injectable 20 milliLiter(s) Local Injection once  multivitamin 1 Tablet(s) Oral daily  sodium chloride 0.65% Nasal 1 Spray(s) Both Nostrils four times a day PRN  sodium chloride 0.9% lock flush 3 milliLiter(s) IV Push every 8 hours      PMHX/PSHX:  Dementia without behavioral disturbance, unspecified dementia type  No pertinent past medical history  No significant past surgical history      Family history:  No pertinent family history in first degree relatives      ROS:     General:  No wt loss, fevers, chills, night sweats, fatigue,   Eyes:  Good vision, no reported pain  ENT:  No sore throat, pain, runny nose, dysphagia  CV:  No pain, palpitations, hypo/hypertension  Resp:  No dyspnea, cough, tachypnea, wheezing  GI:  see HPI  :  No pain, bleeding, incontinence, nocturia  Muscle:  No pain, weakness  Neuro:  No weakness, tingling, memory problems  Psych:  No fatigue, insomnia, mood problems, depression  Endocrine:  No polyuria, polydipsia, cold/heat intolerance  Heme:  No petechiae, ecchymosis, easy bruisability  Skin:  No rash, tattoos, scars, edema      PHYSICAL EXAM:   Vital Signs:  Vital Signs Last 24 Hrs  T(C): 36.9 (2018 04:48), Max: 37.2 (2018 12:00)  T(F): 98.5 (2018 04:48), Max: 98.9 (2018 12:00)  HR: 68 (2018 06:13) (67 - 72)  BP: 152/77 (2018 06:13) (144/77 - 170/90)  BP(mean): --  RR: 18 (2018 04:48) (16 - 18)  SpO2: 97% (2018 04:48) (95% - 98%)  Daily     Daily Weight in k.6 (2018 06:21)    GENERAL:  no acute distress  HEENT:  -icterus   CHEST:  clear bilaterally, no wheezes or rales  HEART:  RRR, S1S2  ABDOMEN:  soft, non-tender, non-distended, PEG site c/d/i, bumper at 2cm  EXTEREMITIES:  no  edema  SKIN:  warm/dry  NEURO:  awake GASTROENTEROLOGY FOLLOW-UP NOTE    Chief Complaint:  Patient is a 77y old  Male who presents with a chief complaint of Seizure x 1 day (16 Dec 2017 01:26)      Interval Events:   - PEG placed yesterday  - No events overnight; afebrile.    Allergies:  No Known Allergies      Hospital Medications:  acetaminophen  Suppository 650 milliGRAM(s) Rectal every 6 hours PRN  amLODIPine   Tablet 5 milliGRAM(s) Oral daily  aspirin enteric coated 81 milliGRAM(s) Oral daily  atorvastatin 80 milliGRAM(s) Oral at bedtime  BACItracin   Ointment 1 Application(s) Topical daily  ceFAZolin   IVPB      ceFAZolin   IVPB 2000 milliGRAM(s) IV Intermittent every 8 hours  dextrose 5% + sodium chloride 0.9% with potassium chloride 20 mEq/L 1000 milliLiter(s) IV Continuous <Continuous>  fosphenytoin IVPB 125 milliGRAM(s) PE IV Intermittent every 8 hours  heparin  Injectable 5000 Unit(s) SubCutaneous every 12 hours  lacosamide IVPB 200 milliGRAM(s) IV Intermittent every 12 hours  levETIRAcetam  IVPB 1500 milliGRAM(s) IV Intermittent every 12 hours  lidocaine 1% Injectable 20 milliLiter(s) Local Injection once  multivitamin 1 Tablet(s) Oral daily  sodium chloride 0.65% Nasal 1 Spray(s) Both Nostrils four times a day PRN  sodium chloride 0.9% lock flush 3 milliLiter(s) IV Push every 8 hours      PMHX/PSHX:  Dementia without behavioral disturbance, unspecified dementia type  No pertinent past medical history  No significant past surgical history      Family history:  No pertinent family history in first degree relatives      ROS:     General:  No wt loss, fevers, chills, night sweats, fatigue,   Eyes:  Good vision, no reported pain  ENT:  No sore throat, pain, runny nose, dysphagia  CV:  No pain, palpitations, hypo/hypertension  Resp:  No dyspnea, cough, tachypnea, wheezing  GI:  see HPI  :  No pain, bleeding, incontinence, nocturia  Muscle:  No pain, weakness  Neuro:  No weakness, tingling, memory problems  Psych:  No fatigue, insomnia, mood problems, depression  Endocrine:  No polyuria, polydipsia, cold/heat intolerance  Heme:  No petechiae, ecchymosis, easy bruisability  Skin:  No rash, tattoos, scars, edema      PHYSICAL EXAM:   Vital Signs:  Vital Signs Last 24 Hrs  T(C): 36.9 (2018 04:48), Max: 37.2 (2018 12:00)  T(F): 98.5 (2018 04:48), Max: 98.9 (2018 12:00)  HR: 68 (2018 06:13) (67 - 72)  BP: 152/77 (2018 06:13) (144/77 - 170/90)  BP(mean): --  RR: 18 (2018 04:48) (16 - 18)  SpO2: 97% (2018 04:48) (95% - 98%)  Daily     Daily Weight in k.6 (2018 06:21)    GENERAL:  no acute distress  HEENT:  -icterus   CHEST:  clear bilaterally, no wheezes or rales  HEART:  RRR, S1S2  ABDOMEN:  soft, non-tender, non-distended, PEG site c/d/i, bumper at 3.5cm  EXTEREMITIES:  no  edema  SKIN:  warm/dry  NEURO:  awake

## 2018-01-05 NOTE — PROGRESS NOTE ADULT - PROBLEM SELECTOR PLAN 8
abd US unremarkable , GI recommend hepatology eval  will check stat labs for LFts, if still trending up, will consult hepatology

## 2018-01-05 NOTE — PROGRESS NOTE ADULT - PROBLEM SELECTOR PLAN 5
Pt failed s/s eval  s/p PEG placement yesterday , will start PEG feeding today,   nutrition f/u for recommendations

## 2018-01-06 LAB
ALBUMIN SERPL ELPH-MCNC: 2.2 G/DL — LOW (ref 3.3–5)
ALP SERPL-CCNC: 270 U/L — HIGH (ref 40–120)
ALT FLD-CCNC: 39 U/L — SIGNIFICANT CHANGE UP (ref 4–41)
APPEARANCE UR: CLEAR — SIGNIFICANT CHANGE UP
AST SERPL-CCNC: 102 U/L — HIGH (ref 4–40)
B PERT DNA SPEC QL NAA+PROBE: SIGNIFICANT CHANGE UP
BACTERIA # UR AUTO: SIGNIFICANT CHANGE UP
BASOPHILS # BLD AUTO: 0.12 K/UL — SIGNIFICANT CHANGE UP (ref 0–0.2)
BASOPHILS NFR BLD AUTO: 1 % — SIGNIFICANT CHANGE UP (ref 0–2)
BILIRUB SERPL-MCNC: 0.4 MG/DL — SIGNIFICANT CHANGE UP (ref 0.2–1.2)
BILIRUB UR-MCNC: NEGATIVE — SIGNIFICANT CHANGE UP
BLOOD UR QL VISUAL: HIGH
BUN SERPL-MCNC: 17 MG/DL — SIGNIFICANT CHANGE UP (ref 7–23)
C PNEUM DNA SPEC QL NAA+PROBE: NOT DETECTED — SIGNIFICANT CHANGE UP
CALCIUM SERPL-MCNC: 8.7 MG/DL — SIGNIFICANT CHANGE UP (ref 8.4–10.5)
CHLORIDE SERPL-SCNC: 102 MMOL/L — SIGNIFICANT CHANGE UP (ref 98–107)
CO2 SERPL-SCNC: 22 MMOL/L — SIGNIFICANT CHANGE UP (ref 22–31)
COLOR SPEC: YELLOW — SIGNIFICANT CHANGE UP
CREAT SERPL-MCNC: 0.79 MG/DL — SIGNIFICANT CHANGE UP (ref 0.5–1.3)
EOSINOPHIL # BLD AUTO: 0.33 K/UL — SIGNIFICANT CHANGE UP (ref 0–0.5)
EOSINOPHIL NFR BLD AUTO: 2.7 % — SIGNIFICANT CHANGE UP (ref 0–6)
FLUAV H1 2009 PAND RNA SPEC QL NAA+PROBE: NOT DETECTED — SIGNIFICANT CHANGE UP
FLUAV H1 RNA SPEC QL NAA+PROBE: NOT DETECTED — SIGNIFICANT CHANGE UP
FLUAV H3 RNA SPEC QL NAA+PROBE: NOT DETECTED — SIGNIFICANT CHANGE UP
FLUAV SUBTYP SPEC NAA+PROBE: SIGNIFICANT CHANGE UP
FLUBV RNA SPEC QL NAA+PROBE: NOT DETECTED — SIGNIFICANT CHANGE UP
GLUCOSE SERPL-MCNC: 162 MG/DL — HIGH (ref 70–99)
GLUCOSE UR-MCNC: NEGATIVE — SIGNIFICANT CHANGE UP
HADV DNA SPEC QL NAA+PROBE: NOT DETECTED — SIGNIFICANT CHANGE UP
HAV IGM SER-ACNC: NONREACTIVE — SIGNIFICANT CHANGE UP
HBV CORE IGM SER-ACNC: NONREACTIVE — SIGNIFICANT CHANGE UP
HBV SURFACE AG SER-ACNC: NONREACTIVE — SIGNIFICANT CHANGE UP
HCOV 229E RNA SPEC QL NAA+PROBE: NOT DETECTED — SIGNIFICANT CHANGE UP
HCOV HKU1 RNA SPEC QL NAA+PROBE: NOT DETECTED — SIGNIFICANT CHANGE UP
HCOV NL63 RNA SPEC QL NAA+PROBE: NOT DETECTED — SIGNIFICANT CHANGE UP
HCOV OC43 RNA SPEC QL NAA+PROBE: NOT DETECTED — SIGNIFICANT CHANGE UP
HCT VFR BLD CALC: 34.2 % — LOW (ref 39–50)
HCV AB S/CO SERPL IA: 0.22 S/CO — SIGNIFICANT CHANGE UP
HCV AB SERPL-IMP: SIGNIFICANT CHANGE UP
HGB BLD-MCNC: 11 G/DL — LOW (ref 13–17)
HMPV RNA SPEC QL NAA+PROBE: NOT DETECTED — SIGNIFICANT CHANGE UP
HPIV1 RNA SPEC QL NAA+PROBE: NOT DETECTED — SIGNIFICANT CHANGE UP
HPIV2 RNA SPEC QL NAA+PROBE: NOT DETECTED — SIGNIFICANT CHANGE UP
HPIV3 RNA SPEC QL NAA+PROBE: NOT DETECTED — SIGNIFICANT CHANGE UP
HPIV4 RNA SPEC QL NAA+PROBE: NOT DETECTED — SIGNIFICANT CHANGE UP
IMM GRANULOCYTES # BLD AUTO: 0.11 # — SIGNIFICANT CHANGE UP
IMM GRANULOCYTES NFR BLD AUTO: 0.9 % — SIGNIFICANT CHANGE UP (ref 0–1.5)
KETONES UR-MCNC: NEGATIVE — SIGNIFICANT CHANGE UP
LEUKOCYTE ESTERASE UR-ACNC: NEGATIVE — SIGNIFICANT CHANGE UP
LYMPHOCYTES # BLD AUTO: 0.95 K/UL — LOW (ref 1–3.3)
LYMPHOCYTES # BLD AUTO: 7.7 % — LOW (ref 13–44)
M PNEUMO DNA SPEC QL NAA+PROBE: NOT DETECTED — SIGNIFICANT CHANGE UP
MAGNESIUM SERPL-MCNC: 1.9 MG/DL — SIGNIFICANT CHANGE UP (ref 1.6–2.6)
MCHC RBC-ENTMCNC: 28.8 PG — SIGNIFICANT CHANGE UP (ref 27–34)
MCHC RBC-ENTMCNC: 32.2 % — SIGNIFICANT CHANGE UP (ref 32–36)
MCV RBC AUTO: 89.5 FL — SIGNIFICANT CHANGE UP (ref 80–100)
MONOCYTES # BLD AUTO: 0.92 K/UL — HIGH (ref 0–0.9)
MONOCYTES NFR BLD AUTO: 7.4 % — SIGNIFICANT CHANGE UP (ref 2–14)
MUCOUS THREADS # UR AUTO: SIGNIFICANT CHANGE UP
NEUTROPHILS # BLD AUTO: 9.98 K/UL — HIGH (ref 1.8–7.4)
NEUTROPHILS NFR BLD AUTO: 80.3 % — HIGH (ref 43–77)
NITRITE UR-MCNC: NEGATIVE — SIGNIFICANT CHANGE UP
NON-SQ EPI CELLS # UR AUTO: <1 — SIGNIFICANT CHANGE UP
NRBC # FLD: 0 — SIGNIFICANT CHANGE UP
PH UR: 5.5 — SIGNIFICANT CHANGE UP (ref 4.6–8)
PHOSPHATE SERPL-MCNC: 2.6 MG/DL — SIGNIFICANT CHANGE UP (ref 2.5–4.5)
PLATELET # BLD AUTO: 487 K/UL — HIGH (ref 150–400)
PMV BLD: 8.6 FL — SIGNIFICANT CHANGE UP (ref 7–13)
POTASSIUM SERPL-MCNC: 4.1 MMOL/L — SIGNIFICANT CHANGE UP (ref 3.5–5.3)
POTASSIUM SERPL-SCNC: 4.1 MMOL/L — SIGNIFICANT CHANGE UP (ref 3.5–5.3)
PROT SERPL-MCNC: 6.6 G/DL — SIGNIFICANT CHANGE UP (ref 6–8.3)
PROT UR-MCNC: 30 MG/DL — HIGH
RBC # BLD: 3.82 M/UL — LOW (ref 4.2–5.8)
RBC # FLD: 14.4 % — SIGNIFICANT CHANGE UP (ref 10.3–14.5)
RBC CASTS # UR COMP ASSIST: SIGNIFICANT CHANGE UP (ref 0–?)
RSV RNA SPEC QL NAA+PROBE: NOT DETECTED — SIGNIFICANT CHANGE UP
RV+EV RNA SPEC QL NAA+PROBE: NOT DETECTED — SIGNIFICANT CHANGE UP
SODIUM SERPL-SCNC: 135 MMOL/L — SIGNIFICANT CHANGE UP (ref 135–145)
SP GR SPEC: 1.02 — SIGNIFICANT CHANGE UP (ref 1–1.04)
SPECIMEN SOURCE: SIGNIFICANT CHANGE UP
SPECIMEN SOURCE: SIGNIFICANT CHANGE UP
UROBILINOGEN FLD QL: NORMAL MG/DL — SIGNIFICANT CHANGE UP
WBC # BLD: 12.41 K/UL — HIGH (ref 3.8–10.5)
WBC # FLD AUTO: 12.41 K/UL — HIGH (ref 3.8–10.5)
WBC UR QL: SIGNIFICANT CHANGE UP (ref 0–?)

## 2018-01-06 PROCEDURE — 99233 SBSQ HOSP IP/OBS HIGH 50: CPT

## 2018-01-06 RX ADMIN — FOSPHENYTOIN 105 MILLIGRAM(S) PE: 50 INJECTION INTRAMUSCULAR; INTRAVENOUS at 01:09

## 2018-01-06 RX ADMIN — AMLODIPINE BESYLATE 5 MILLIGRAM(S): 2.5 TABLET ORAL at 05:31

## 2018-01-06 RX ADMIN — SODIUM CHLORIDE 3 MILLILITER(S): 9 INJECTION INTRAMUSCULAR; INTRAVENOUS; SUBCUTANEOUS at 21:06

## 2018-01-06 RX ADMIN — LACOSAMIDE 140 MILLIGRAM(S): 50 TABLET ORAL at 17:53

## 2018-01-06 RX ADMIN — LEVETIRACETAM 400 MILLIGRAM(S): 250 TABLET, FILM COATED ORAL at 20:19

## 2018-01-06 RX ADMIN — HEPARIN SODIUM 5000 UNIT(S): 5000 INJECTION INTRAVENOUS; SUBCUTANEOUS at 17:53

## 2018-01-06 RX ADMIN — FOSPHENYTOIN 105 MILLIGRAM(S) PE: 50 INJECTION INTRAMUSCULAR; INTRAVENOUS at 09:29

## 2018-01-06 RX ADMIN — Medication 100 MILLIGRAM(S): at 05:32

## 2018-01-06 RX ADMIN — Medication 81 MILLIGRAM(S): at 12:02

## 2018-01-06 RX ADMIN — LEVETIRACETAM 400 MILLIGRAM(S): 250 TABLET, FILM COATED ORAL at 08:42

## 2018-01-06 RX ADMIN — SODIUM CHLORIDE 3 MILLILITER(S): 9 INJECTION INTRAMUSCULAR; INTRAVENOUS; SUBCUTANEOUS at 05:32

## 2018-01-06 RX ADMIN — LACOSAMIDE 140 MILLIGRAM(S): 50 TABLET ORAL at 05:32

## 2018-01-06 RX ADMIN — Medication 100 MILLIGRAM(S): at 21:22

## 2018-01-06 RX ADMIN — Medication 1 APPLICATION(S): at 12:02

## 2018-01-06 RX ADMIN — Medication 100 MILLIGRAM(S): at 13:19

## 2018-01-06 RX ADMIN — DEXTROSE MONOHYDRATE, SODIUM CHLORIDE, AND POTASSIUM CHLORIDE 50 MILLILITER(S): 50; .745; 4.5 INJECTION, SOLUTION INTRAVENOUS at 13:35

## 2018-01-06 RX ADMIN — HEPARIN SODIUM 5000 UNIT(S): 5000 INJECTION INTRAVENOUS; SUBCUTANEOUS at 05:32

## 2018-01-06 RX ADMIN — FOSPHENYTOIN 105 MILLIGRAM(S) PE: 50 INJECTION INTRAMUSCULAR; INTRAVENOUS at 17:53

## 2018-01-06 RX ADMIN — ATORVASTATIN CALCIUM 80 MILLIGRAM(S): 80 TABLET, FILM COATED ORAL at 21:22

## 2018-01-06 RX ADMIN — SODIUM CHLORIDE 3 MILLILITER(S): 9 INJECTION INTRAMUSCULAR; INTRAVENOUS; SUBCUTANEOUS at 13:07

## 2018-01-06 RX ADMIN — Medication 1 TABLET(S): at 12:02

## 2018-01-06 RX ADMIN — Medication 650 MILLIGRAM(S): at 23:39

## 2018-01-06 NOTE — PROGRESS NOTE ADULT - PROBLEM SELECTOR PLAN 5
Pt failed s/s eval  s/p PEG placement and started on  PEG feeding , tolerating it well   nutrition  recommendations noted

## 2018-01-06 NOTE — PROGRESS NOTE ADULT - SUBJECTIVE AND OBJECTIVE BOX
Patient is a 77y old  Male who presents with a chief complaint of Seizure x 1 day (16 Dec 2017 01:26)      SUBJECTIVE / OVERNIGHT EVENTS: patient seen and examined by bedside, no acute distress noted, tolerating PEG feeding . Pt noted to have low grade fever         MEDICATIONS  (STANDING):  amLODIPine   Tablet 5 milliGRAM(s) Oral daily  aspirin enteric coated 81 milliGRAM(s) Oral daily  atorvastatin 80 milliGRAM(s) Oral at bedtime  BACItracin   Ointment 1 Application(s) Topical daily  ceFAZolin   IVPB      ceFAZolin   IVPB 2000 milliGRAM(s) IV Intermittent every 8 hours  dextrose 5% + sodium chloride 0.9% with potassium chloride 20 mEq/L 1000 milliLiter(s) (50 mL/Hr) IV Continuous <Continuous>  fosphenytoin IVPB 125 milliGRAM(s) PE IV Intermittent every 8 hours  heparin  Injectable 5000 Unit(s) SubCutaneous every 12 hours  lacosamide IVPB 200 milliGRAM(s) IV Intermittent every 12 hours  levETIRAcetam  IVPB 1500 milliGRAM(s) IV Intermittent every 12 hours  lidocaine 1% Injectable 20 milliLiter(s) Local Injection once  multivitamin 1 Tablet(s) Oral daily  sodium chloride 0.9% lock flush 3 milliLiter(s) IV Push every 8 hours    MEDICATIONS  (PRN):  acetaminophen  Suppository 650 milliGRAM(s) Rectal every 6 hours PRN For Temp greater than 38 C (100.4 F)  sodium chloride 0.65% Nasal 1 Spray(s) Both Nostrils four times a day PRN Congestion      Vital Signs Last 24 Hrs  T(C): 37.2 (2018 05:29), Max: 38 (2018 23:45)  T(F): 99 (2018 05:29), Max: 100.4 (2018 23:45)  HR: 72 (2018 05:29) (68 - 77)  BP: 132/61 (2018 05:29) (132/61 - 159/76)  BP(mean): --  RR: 18 (2018 05:29) (18 - 22)  SpO2: 97% (2018 05:29) (97% - 100%)  CAPILLARY BLOOD GLUCOSE        I&O's Summary      PHYSICAL EXAM  GENERAL: NAD  HEAD:  Atraumatic, Normocephalic  NECK: Supple, No JVD  CHEST/LUNG: Clear to auscultation bilaterally; No wheeze  HEART: Regular rate and rhythm; No murmurs, rubs, or gallops  ABDOMEN: Soft, Nontender, Nondistended; Bowel sounds present, PEG +  EXTREMITIES:  Contracted, No clubbing, cyanosis, or edema  PSYCH: awake, alert  SKIN: No rashes or lesions    LABS:                        11.0   12.41 )-----------( 487      ( 2018 06:15 )             34.2         135  |  102  |  17  ----------------------------<  162<H>  4.1   |  22  |  0.79    Ca    8.7      2018 06:15  Phos  2.6       Mg     1.9         TPro  6.6  /  Alb  2.2<L>  /  TBili  0.4  /  DBili  x   /  AST  102<H>  /  ALT  39  /  AlkPhos  270<H>            Urinalysis Basic - ( 2018 06:11 )    Color: YELLOW / Appearance: CLEAR / S.017 / pH: 5.5  Gluc: NEGATIVE / Ketone: NEGATIVE  / Bili: NEGATIVE / Urobili: NORMAL mg/dL   Blood: TRACE / Protein: 30 mg/dL / Nitrite: NEGATIVE   Leuk Esterase: NEGATIVE / RBC: 0-2 / WBC 0-2   Sq Epi: x / Non Sq Epi: x / Bacteria: FEW        RADIOLOGY & ADDITIONAL TESTS:    Imaging Personally Reviewed:    Consultant(s) Notes Reviewed:      Care Discussed with Consultants/Other Providers:

## 2018-01-06 NOTE — PROGRESS NOTE ADULT - PROBLEM SELECTOR PLAN 2
resolved. s/p empiric abx and acyclovir   observe off abx per ID  leucocytosis and low grade fever, may be secondary to recent PEG, will continue to monitor

## 2018-01-06 NOTE — PROVIDER CONTACT NOTE (OTHER) - ACTION/TREATMENT ORDERED:
MARIA DEL CARMEN Valdez stated that Due to Ativan given earlier, Pt may be more lethargic, therefore morning PO medications should be pushed back when Pt is more alert.
Administer Tylenol PRN. Order for blood cultures and UA. Reassess temperature 2 hours after Tylenol.
Continue to monitor.
Reassess blood pressure.

## 2018-01-06 NOTE — PROGRESS NOTE ADULT - PROBLEM SELECTOR PLAN 8
abd US unremarkable , GI recommend hepatology eval  , will consult hepatology as LFTs continues to worsen

## 2018-01-07 DIAGNOSIS — R13.10 DYSPHAGIA, UNSPECIFIED: ICD-10-CM

## 2018-01-07 DIAGNOSIS — R50.9 FEVER, UNSPECIFIED: ICD-10-CM

## 2018-01-07 LAB
ALBUMIN SERPL ELPH-MCNC: 2.2 G/DL — LOW (ref 3.3–5)
ALP SERPL-CCNC: 243 U/L — HIGH (ref 40–120)
ALT FLD-CCNC: 26 U/L — SIGNIFICANT CHANGE UP (ref 4–41)
ANA TITR SER: NEGATIVE — SIGNIFICANT CHANGE UP
AST SERPL-CCNC: 91 U/L — HIGH (ref 4–40)
BASOPHILS # BLD AUTO: 0.12 K/UL — SIGNIFICANT CHANGE UP (ref 0–0.2)
BASOPHILS NFR BLD AUTO: 0.8 % — SIGNIFICANT CHANGE UP (ref 0–2)
BILIRUB SERPL-MCNC: 0.3 MG/DL — SIGNIFICANT CHANGE UP (ref 0.2–1.2)
BUN SERPL-MCNC: 16 MG/DL — SIGNIFICANT CHANGE UP (ref 7–23)
CALCIUM SERPL-MCNC: 8.3 MG/DL — LOW (ref 8.4–10.5)
CHLORIDE SERPL-SCNC: 101 MMOL/L — SIGNIFICANT CHANGE UP (ref 98–107)
CO2 SERPL-SCNC: 22 MMOL/L — SIGNIFICANT CHANGE UP (ref 22–31)
CREAT SERPL-MCNC: 0.7 MG/DL — SIGNIFICANT CHANGE UP (ref 0.5–1.3)
EOSINOPHIL # BLD AUTO: 0.42 K/UL — SIGNIFICANT CHANGE UP (ref 0–0.5)
EOSINOPHIL NFR BLD AUTO: 2.8 % — SIGNIFICANT CHANGE UP (ref 0–6)
GLUCOSE SERPL-MCNC: 147 MG/DL — HIGH (ref 70–99)
HCT VFR BLD CALC: 34 % — LOW (ref 39–50)
HGB BLD-MCNC: 11.5 G/DL — LOW (ref 13–17)
IMM GRANULOCYTES # BLD AUTO: 0.14 # — SIGNIFICANT CHANGE UP
IMM GRANULOCYTES NFR BLD AUTO: 0.9 % — SIGNIFICANT CHANGE UP (ref 0–1.5)
LYMPHOCYTES # BLD AUTO: 1.11 K/UL — SIGNIFICANT CHANGE UP (ref 1–3.3)
LYMPHOCYTES # BLD AUTO: 7.4 % — LOW (ref 13–44)
MAGNESIUM SERPL-MCNC: 1.7 MG/DL — SIGNIFICANT CHANGE UP (ref 1.6–2.6)
MCHC RBC-ENTMCNC: 29.9 PG — SIGNIFICANT CHANGE UP (ref 27–34)
MCHC RBC-ENTMCNC: 33.8 % — SIGNIFICANT CHANGE UP (ref 32–36)
MCV RBC AUTO: 88.3 FL — SIGNIFICANT CHANGE UP (ref 80–100)
MONOCYTES # BLD AUTO: 1.09 K/UL — HIGH (ref 0–0.9)
MONOCYTES NFR BLD AUTO: 7.2 % — SIGNIFICANT CHANGE UP (ref 2–14)
NEUTROPHILS # BLD AUTO: 12.18 K/UL — HIGH (ref 1.8–7.4)
NEUTROPHILS NFR BLD AUTO: 80.9 % — HIGH (ref 43–77)
NRBC # FLD: 0 — SIGNIFICANT CHANGE UP
PHOSPHATE SERPL-MCNC: 2.3 MG/DL — LOW (ref 2.5–4.5)
PLATELET # BLD AUTO: 485 K/UL — HIGH (ref 150–400)
PMV BLD: 8.6 FL — SIGNIFICANT CHANGE UP (ref 7–13)
POTASSIUM SERPL-MCNC: 4 MMOL/L — SIGNIFICANT CHANGE UP (ref 3.5–5.3)
POTASSIUM SERPL-SCNC: 4 MMOL/L — SIGNIFICANT CHANGE UP (ref 3.5–5.3)
PROT SERPL-MCNC: 6.8 G/DL — SIGNIFICANT CHANGE UP (ref 6–8.3)
RBC # BLD: 3.85 M/UL — LOW (ref 4.2–5.8)
RBC # FLD: 14.4 % — SIGNIFICANT CHANGE UP (ref 10.3–14.5)
SODIUM SERPL-SCNC: 134 MMOL/L — LOW (ref 135–145)
WBC # BLD: 15.06 K/UL — HIGH (ref 3.8–10.5)
WBC # FLD AUTO: 15.06 K/UL — HIGH (ref 3.8–10.5)

## 2018-01-07 PROCEDURE — 74177 CT ABD & PELVIS W/CONTRAST: CPT | Mod: 26

## 2018-01-07 PROCEDURE — 99233 SBSQ HOSP IP/OBS HIGH 50: CPT

## 2018-01-07 PROCEDURE — 71045 X-RAY EXAM CHEST 1 VIEW: CPT | Mod: 26

## 2018-01-07 PROCEDURE — 71260 CT THORAX DX C+: CPT | Mod: 26

## 2018-01-07 RX ORDER — POTASSIUM PHOSPHATE, MONOBASIC POTASSIUM PHOSPHATE, DIBASIC 236; 224 MG/ML; MG/ML
15 INJECTION, SOLUTION INTRAVENOUS ONCE
Qty: 0 | Refills: 0 | Status: COMPLETED | OUTPATIENT
Start: 2018-01-07 | End: 2018-01-07

## 2018-01-07 RX ORDER — AMLODIPINE BESYLATE 2.5 MG/1
5 TABLET ORAL DAILY
Qty: 0 | Refills: 0 | Status: DISCONTINUED | OUTPATIENT
Start: 2018-01-07 | End: 2018-01-18

## 2018-01-07 RX ORDER — ASPIRIN/CALCIUM CARB/MAGNESIUM 324 MG
81 TABLET ORAL DAILY
Qty: 0 | Refills: 0 | Status: DISCONTINUED | OUTPATIENT
Start: 2018-01-07 | End: 2018-01-18

## 2018-01-07 RX ORDER — ATORVASTATIN CALCIUM 80 MG/1
80 TABLET, FILM COATED ORAL AT BEDTIME
Qty: 0 | Refills: 0 | Status: DISCONTINUED | OUTPATIENT
Start: 2018-01-07 | End: 2018-01-11

## 2018-01-07 RX ADMIN — LACOSAMIDE 140 MILLIGRAM(S): 50 TABLET ORAL at 17:22

## 2018-01-07 RX ADMIN — Medication 1 TABLET(S): at 14:06

## 2018-01-07 RX ADMIN — HEPARIN SODIUM 5000 UNIT(S): 5000 INJECTION INTRAVENOUS; SUBCUTANEOUS at 05:42

## 2018-01-07 RX ADMIN — AMLODIPINE BESYLATE 5 MILLIGRAM(S): 2.5 TABLET ORAL at 05:42

## 2018-01-07 RX ADMIN — SODIUM CHLORIDE 3 MILLILITER(S): 9 INJECTION INTRAMUSCULAR; INTRAVENOUS; SUBCUTANEOUS at 21:07

## 2018-01-07 RX ADMIN — LEVETIRACETAM 400 MILLIGRAM(S): 250 TABLET, FILM COATED ORAL at 08:11

## 2018-01-07 RX ADMIN — LACOSAMIDE 140 MILLIGRAM(S): 50 TABLET ORAL at 06:23

## 2018-01-07 RX ADMIN — SODIUM CHLORIDE 3 MILLILITER(S): 9 INJECTION INTRAMUSCULAR; INTRAVENOUS; SUBCUTANEOUS at 13:46

## 2018-01-07 RX ADMIN — Medication 100 MILLIGRAM(S): at 21:10

## 2018-01-07 RX ADMIN — LEVETIRACETAM 400 MILLIGRAM(S): 250 TABLET, FILM COATED ORAL at 20:43

## 2018-01-07 RX ADMIN — FOSPHENYTOIN 105 MILLIGRAM(S) PE: 50 INJECTION INTRAMUSCULAR; INTRAVENOUS at 10:30

## 2018-01-07 RX ADMIN — Medication 81 MILLIGRAM(S): at 14:06

## 2018-01-07 RX ADMIN — Medication 100 MILLIGRAM(S): at 14:07

## 2018-01-07 RX ADMIN — FOSPHENYTOIN 105 MILLIGRAM(S) PE: 50 INJECTION INTRAMUSCULAR; INTRAVENOUS at 19:14

## 2018-01-07 RX ADMIN — Medication 1 APPLICATION(S): at 14:06

## 2018-01-07 RX ADMIN — HEPARIN SODIUM 5000 UNIT(S): 5000 INJECTION INTRAVENOUS; SUBCUTANEOUS at 17:22

## 2018-01-07 RX ADMIN — DEXTROSE MONOHYDRATE, SODIUM CHLORIDE, AND POTASSIUM CHLORIDE 50 MILLILITER(S): 50; .745; 4.5 INJECTION, SOLUTION INTRAVENOUS at 19:14

## 2018-01-07 RX ADMIN — DEXTROSE MONOHYDRATE, SODIUM CHLORIDE, AND POTASSIUM CHLORIDE 50 MILLILITER(S): 50; .745; 4.5 INJECTION, SOLUTION INTRAVENOUS at 08:12

## 2018-01-07 RX ADMIN — Medication 100 MILLIGRAM(S): at 05:42

## 2018-01-07 RX ADMIN — SODIUM CHLORIDE 3 MILLILITER(S): 9 INJECTION INTRAMUSCULAR; INTRAVENOUS; SUBCUTANEOUS at 05:41

## 2018-01-07 RX ADMIN — FOSPHENYTOIN 105 MILLIGRAM(S) PE: 50 INJECTION INTRAMUSCULAR; INTRAVENOUS at 01:19

## 2018-01-07 RX ADMIN — ATORVASTATIN CALCIUM 80 MILLIGRAM(S): 80 TABLET, FILM COATED ORAL at 21:52

## 2018-01-07 RX ADMIN — POTASSIUM PHOSPHATE, MONOBASIC POTASSIUM PHOSPHATE, DIBASIC 62.5 MILLIMOLE(S): 236; 224 INJECTION, SOLUTION INTRAVENOUS at 21:52

## 2018-01-07 NOTE — CONSULT NOTE ADULT - SUBJECTIVE AND OBJECTIVE BOX
HEPATOLOGY INITIAL CONSULT NOTE    Chief Complaint:  Patient is a 77y old  Male who presents with a chief complaint of Seizure x 1 day (16 Dec 2017 01:26)      HPI: 77y Male with PMH dementia, HTN admitted in December with altered mental status now s/p prolonged hospital course. 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 Hospital course has been complicated by fevers of unclear etiology for which patient has received multiple courses of antibiotics as well as dysphagia for which patient underwent PEG placement. Hepatology consulted for abnormal liver enzymes. Patient first noted to have elevated liver enzymes on December 30 after they had not been checked since admission (at which time they were normal). He was found to have elevation in alkaline phosphatase, AST, and ALT with a normal bilirubin. A RUQ ultrasound was done which showed normal bile ducts and gall bladder. Patient unable to provide further history of supplement use, alcohol use, or prior issues with his liver.     Allergies:  No Known Allergies      Hospital Medications:  acetaminophen  Suppository 650 milliGRAM(s) Rectal every 6 hours PRN  amLODIPine   Tablet 5 milliGRAM(s) Oral daily  aspirin enteric coated 81 milliGRAM(s) Oral daily  atorvastatin 80 milliGRAM(s) Oral at bedtime  BACItracin   Ointment 1 Application(s) Topical daily  ceFAZolin   IVPB      ceFAZolin   IVPB 2000 milliGRAM(s) IV Intermittent every 8 hours  dextrose 5% + sodium chloride 0.9% with potassium chloride 20 mEq/L 1000 milliLiter(s) IV Continuous <Continuous>  fosphenytoin IVPB 125 milliGRAM(s) PE IV Intermittent every 8 hours  heparin  Injectable 5000 Unit(s) SubCutaneous every 12 hours  lacosamide IVPB 200 milliGRAM(s) IV Intermittent every 12 hours  levETIRAcetam  IVPB 1500 milliGRAM(s) IV Intermittent every 12 hours  lidocaine 1% Injectable 20 milliLiter(s) Local Injection once  multivitamin 1 Tablet(s) Oral daily  sodium chloride 0.65% Nasal 1 Spray(s) Both Nostrils four times a day PRN  sodium chloride 0.9% lock flush 3 milliLiter(s) IV Push every 8 hours      PMHX/PSHX:  Dementia without behavioral disturbance, unspecified dementia type  No pertinent past medical history  No significant past surgical history      Family history:  No pertinent family history in first degree relatives      Social History:     ROS:     General:  No wt loss, fevers, chills, night sweats, fatigue,   Eyes:  Good vision, no reported pain  ENT:  No sore throat, pain, runny nose, dysphagia  CV:  No pain, palpitations, hypo/hypertension  Resp:  No dyspnea, cough, tachypnea, wheezing  GI:  see HPI  :  No pain, bleeding, incontinence, nocturia  Muscle:  No pain, weakness  Neuro:  No weakness, tingling, memory problems  Psych:  No fatigue, insomnia, mood problems, depression  Endocrine:  No polyuria, polydipsia, cold/heat intolerance  Heme:  No petechiae, ecchymosis, easy bruisability  Skin:  No rash, tattoos, scars, edema      PHYSICAL EXAM:   Vital Signs:  Vital Signs Last 24 Hrs  T(C): 37.7 (07 Jan 2018 05:39), Max: 38.2 (06 Jan 2018 23:06)  T(F): 99.8 (07 Jan 2018 05:39), Max: 100.7 (06 Jan 2018 23:06)  HR: 88 (07 Jan 2018 05:39) (76 - 90)  BP: 111/65 (07 Jan 2018 05:39) (111/65 - 172/69)  BP(mean): --  RR: 18 (07 Jan 2018 05:39) (18 - 18)  SpO2: 98% (07 Jan 2018 05:39) (97% - 98%)  Daily     Daily     GENERAL:  no acute distress  HEENT:  -icterus   CHEST:  clear bilaterally, no wheezes or rales  HEART:  RRR, S1S2  ABDOMEN:  soft, non-tender, non-distended, normoactive bowel sounds,  no hepato-splenomegaly  EXTEREMITIES:  no  edema  SKIN:  No rash/erythema/ecchymoses/petechiae/wounds/abscess/warm/dry  NEURO:  alert, oriented, conversant     LABS:                        11.5   15.06 )-----------( 485      ( 07 Jan 2018 05:35 )             34.0     01-07    134<L>  |  101  |  16  ----------------------------<  147<H>  4.0   |  22  |  0.70    Ca    8.3<L>      07 Jan 2018 05:35  Phos  2.3     01-07  Mg     1.7     01-07    TPro  6.8  /  Alb  2.2<L>  /  TBili  0.3  /  DBili  x   /  AST  91<H>  /  ALT  26  /  AlkPhos  243<H>  01-07    LIVER FUNCTIONS - ( 07 Jan 2018 05:35 )  Alb: 2.2 g/dL / Pro: 6.8 g/dL / ALK PHOS: 243 u/L / ALT: 26 u/L / AST: 91 u/L / GGT: x                 Imaging:

## 2018-01-07 NOTE — PROGRESS NOTE ADULT - PROBLEM SELECTOR PLAN 4
resolved  monitor electrolytes resolved  monitor electrolytes  hypophosphatemia: will supplement, f/u BMP in AM

## 2018-01-07 NOTE — CONSULT NOTE ADULT - ATTENDING COMMENTS
Patient was seen and examined with GI fellow at rounds. Agree with above.   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  he has hypoalbuminemia, elevated ALP 2x ULN improving,  but normal ALT   Elevated AST is non-specific,  Workup for abnormal liver tests in progress. Abdominal CT scan with contrast reported multiple subcentimeter hypodensities.  Will recommend  -ALP isoenzymes or GGT  -avoid hepatotoxins  -trend liver tests    -workup for hypoalbuminemia Patient was seen and examined with GI fellow at rounds. Agree with above.   Patient was seen for abnormal liver tests for suspicious drug induced liver injury  Workup for above is in progress.   will recommend  -trend liver tests  -ALP isoenzymes or GGT  -avoid hepatotoxins

## 2018-01-07 NOTE — PROGRESS NOTE ADULT - SUBJECTIVE AND OBJECTIVE BOX
Patient is a 77y old  Male who presents with a chief complaint of Seizure x 1 day (16 Dec 2017 01:26)      SUBJECTIVE / OVERNIGHT EVENTS: patient seen and examined by bedside, denies any acute complain, pt noted to have low grade   fever       MEDICATIONS  (STANDING):  amLODIPine   Tablet 5 milliGRAM(s) Oral daily  aspirin  chewable 81 milliGRAM(s) Enteral Tube daily  atorvastatin 80 milliGRAM(s) Oral at bedtime  BACItracin   Ointment 1 Application(s) Topical daily  ceFAZolin   IVPB      ceFAZolin   IVPB 2000 milliGRAM(s) IV Intermittent every 8 hours  dextrose 5% + sodium chloride 0.9% with potassium chloride 20 mEq/L 1000 milliLiter(s) (50 mL/Hr) IV Continuous <Continuous>  fosphenytoin IVPB 125 milliGRAM(s) PE IV Intermittent every 8 hours  heparin  Injectable 5000 Unit(s) SubCutaneous every 12 hours  lacosamide IVPB 200 milliGRAM(s) IV Intermittent every 12 hours  levETIRAcetam  IVPB 1500 milliGRAM(s) IV Intermittent every 12 hours  multivitamin 1 Tablet(s) Oral daily  sodium chloride 0.9% lock flush 3 milliLiter(s) IV Push every 8 hours    MEDICATIONS  (PRN):  acetaminophen  Suppository 650 milliGRAM(s) Rectal every 6 hours PRN For Temp greater than 38 C (100.4 F)  sodium chloride 0.65% Nasal 1 Spray(s) Both Nostrils four times a day PRN Congestion      Vital Signs Last 24 Hrs  T(C): 37.1 (2018 12:59), Max: 38.2 (2018 23:06)  T(F): 98.7 (2018 12:59), Max: 100.7 (2018 23:06)  HR: 93 (2018 12:59) (88 - 93)  BP: 116/69 (2018 12:59) (111/65 - 143/74)  BP(mean): --  RR: 19 (2018 12:59) (18 - 19)  SpO2: 97% (2018 12:59) (97% - 98%)  CAPILLARY BLOOD GLUCOSE        I&O's Summary      PHYSICAL EXAM  GENERAL: NAD  HEAD:  Atraumatic, Normocephalic  NECK: Supple, No JVD  CHEST/LUNG: Clear to auscultation bilaterally; No wheeze  HEART: Regular rate and rhythm; No murmurs, rubs, or gallops  ABDOMEN: Soft, Nontender, Nondistended; Bowel sounds present, PEG +  EXTREMITIES:  Contracted, No clubbing, cyanosis, or edema  PSYCH: awake, alert  SKIN: No rashes or lesions      LABS:                        11.5   15.06 )-----------( 485      ( 2018 05:35 )             34.0     -    134<L>  |  101  |  16  ----------------------------<  147<H>  4.0   |  22  |  0.70    Ca    8.3<L>      2018 05:35  Phos  2.3       Mg     1.7         TPro  6.8  /  Alb  2.2<L>  /  TBili  0.3  /  DBili  x   /  AST  91<H>  /  ALT  26  /  AlkPhos  243<H>            Urinalysis Basic - ( 2018 06:11 )    Color: YELLOW / Appearance: CLEAR / S.017 / pH: 5.5  Gluc: NEGATIVE / Ketone: NEGATIVE  / Bili: NEGATIVE / Urobili: NORMAL mg/dL   Blood: TRACE / Protein: 30 mg/dL / Nitrite: NEGATIVE   Leuk Esterase: NEGATIVE / RBC: 0-2 / WBC 0-2   Sq Epi: x / Non Sq Epi: x / Bacteria: FEW        RADIOLOGY & ADDITIONAL TESTS:    Imaging Personally Reviewed:    Consultant(s) Notes Reviewed:  GI    Care Discussed with Consultants/Other Providers:

## 2018-01-07 NOTE — CONSULT NOTE ADULT - ASSESSMENT
Impression:  1) Elevated liver enzymes - patient has a mixed hepatocellular (AST>ALT) and cholestatic (ALP>bilirubin) picture. His liver enzymes were normal on admission in December indicating this elevation is secondary to something that has occurred during this hospitalization. His abdominal ultrasound is normal which makes biliary etiology unlikely. Given patient was started on a number of anti-eplileptic drugs, suspect drug-induced liver injury from AEDs (Vimpat, fosphenytoin, Keppra). Patient has also received many different antibiotics which can possibly cause DILI as well.  While suspicion is low, must also rule out viral or autoimmune cases.    2) Altered mental status found to be in status epilepticus now on multiple AEDs  3) Dysphagia s/p PEG  4) Fevers/sepsis - unclear source, s/p multiple antibiotics

## 2018-01-07 NOTE — PROGRESS NOTE ADULT - PROBLEM SELECTOR PLAN 2
resolved. s/p empiric abx and acyclovir   observe off abx per ID  leucocytosis and low grade fever, may be secondary to recent PEG, but will check CTAP, cxr noted ,suboptimal study due to positioning, will add chest imaging to CTAP to R/O other causes for fever and leucocytosis

## 2018-01-07 NOTE — CONSULT NOTE ADULT - PROBLEM SELECTOR RECOMMENDATION 9
- send hepatitis A IgM, hepatitis B surface antibody/antigen, core antibody, HCV, HEV, CARL, ASMA, AMA  - daily CMP and INR  - avoid hepatotoxic drugs

## 2018-01-07 NOTE — CONSULT NOTE ADULT - PROBLEM SELECTOR RECOMMENDATION 2
Low suspicion that fevers are due to recently placed PEG tube as fevers were occurring before PEG placement. Additionally, PEG site looks clean/dry and there is no tenderness to palpation or appreciable fluid collections. However would recommend CXR and CT A/P with contrast to ensure no complication from PEG.

## 2018-01-07 NOTE — PROGRESS NOTE ADULT - PROBLEM SELECTOR PLAN 8
abd US unremarkable , GI recommend hepatology eval  hepatology eval noted,   Hepatitis profile negative, may be drug induced, will monitor, avoid hepatotoxic drugs

## 2018-01-07 NOTE — PROGRESS NOTE ADULT - PROBLEM SELECTOR PLAN 5
Pt failed s/s eval  s/p PEG placement and started on  PEG feeding , tolerating it well   nutrition  recommendations noted  aspiration precaution

## 2018-01-08 LAB
ALBUMIN SERPL ELPH-MCNC: 2 G/DL — LOW (ref 3.3–5)
ALP SERPL-CCNC: 221 U/L — HIGH (ref 40–120)
ALT FLD-CCNC: 34 U/L — SIGNIFICANT CHANGE UP (ref 4–41)
APTT BLD: 23.2 SEC — LOW (ref 27.5–37.4)
AST SERPL-CCNC: 122 U/L — HIGH (ref 4–40)
BASOPHILS # BLD AUTO: 0.12 K/UL — SIGNIFICANT CHANGE UP (ref 0–0.2)
BASOPHILS NFR BLD AUTO: 0.9 % — SIGNIFICANT CHANGE UP (ref 0–2)
BILIRUB SERPL-MCNC: 0.3 MG/DL — SIGNIFICANT CHANGE UP (ref 0.2–1.2)
BUN SERPL-MCNC: 15 MG/DL — SIGNIFICANT CHANGE UP (ref 7–23)
CALCIUM SERPL-MCNC: 8.4 MG/DL — SIGNIFICANT CHANGE UP (ref 8.4–10.5)
CHLORIDE SERPL-SCNC: 100 MMOL/L — SIGNIFICANT CHANGE UP (ref 98–107)
CO2 SERPL-SCNC: 23 MMOL/L — SIGNIFICANT CHANGE UP (ref 22–31)
CREAT SERPL-MCNC: 0.67 MG/DL — SIGNIFICANT CHANGE UP (ref 0.5–1.3)
EOSINOPHIL # BLD AUTO: 0.62 K/UL — HIGH (ref 0–0.5)
EOSINOPHIL NFR BLD AUTO: 4.5 % — SIGNIFICANT CHANGE UP (ref 0–6)
GLUCOSE SERPL-MCNC: 134 MG/DL — HIGH (ref 70–99)
HCT VFR BLD CALC: 36 % — LOW (ref 39–50)
HGB BLD-MCNC: 11.4 G/DL — LOW (ref 13–17)
IMM GRANULOCYTES # BLD AUTO: 0.14 # — SIGNIFICANT CHANGE UP
IMM GRANULOCYTES NFR BLD AUTO: 1 % — SIGNIFICANT CHANGE UP (ref 0–1.5)
INR BLD: 1.14 — SIGNIFICANT CHANGE UP (ref 0.88–1.17)
LYMPHOCYTES # BLD AUTO: 1.12 K/UL — SIGNIFICANT CHANGE UP (ref 1–3.3)
LYMPHOCYTES # BLD AUTO: 8.2 % — LOW (ref 13–44)
MAGNESIUM SERPL-MCNC: 1.7 MG/DL — SIGNIFICANT CHANGE UP (ref 1.6–2.6)
MCHC RBC-ENTMCNC: 28.2 PG — SIGNIFICANT CHANGE UP (ref 27–34)
MCHC RBC-ENTMCNC: 31.7 % — LOW (ref 32–36)
MCV RBC AUTO: 89.1 FL — SIGNIFICANT CHANGE UP (ref 80–100)
MONOCYTES # BLD AUTO: 0.95 K/UL — HIGH (ref 0–0.9)
MONOCYTES NFR BLD AUTO: 6.9 % — SIGNIFICANT CHANGE UP (ref 2–14)
NEUTROPHILS # BLD AUTO: 10.75 K/UL — HIGH (ref 1.8–7.4)
NEUTROPHILS NFR BLD AUTO: 78.5 % — HIGH (ref 43–77)
NRBC # FLD: 0 — SIGNIFICANT CHANGE UP
PHOSPHATE SERPL-MCNC: 2.8 MG/DL — SIGNIFICANT CHANGE UP (ref 2.5–4.5)
PLATELET # BLD AUTO: 424 K/UL — HIGH (ref 150–400)
PMV BLD: 9.3 FL — SIGNIFICANT CHANGE UP (ref 7–13)
POTASSIUM SERPL-MCNC: 4.8 MMOL/L — SIGNIFICANT CHANGE UP (ref 3.5–5.3)
POTASSIUM SERPL-SCNC: 4.8 MMOL/L — SIGNIFICANT CHANGE UP (ref 3.5–5.3)
PROT SERPL-MCNC: 6.4 G/DL — SIGNIFICANT CHANGE UP (ref 6–8.3)
PROTHROM AB SERPL-ACNC: 12.7 SEC — SIGNIFICANT CHANGE UP (ref 9.8–13.1)
RBC # BLD: 4.04 M/UL — LOW (ref 4.2–5.8)
RBC # FLD: 14.6 % — HIGH (ref 10.3–14.5)
SODIUM SERPL-SCNC: 135 MMOL/L — SIGNIFICANT CHANGE UP (ref 135–145)
WBC # BLD: 13.7 K/UL — HIGH (ref 3.8–10.5)
WBC # FLD AUTO: 13.7 K/UL — HIGH (ref 3.8–10.5)

## 2018-01-08 PROCEDURE — 99223 1ST HOSP IP/OBS HIGH 75: CPT | Mod: GC

## 2018-01-08 PROCEDURE — 99233 SBSQ HOSP IP/OBS HIGH 50: CPT

## 2018-01-08 RX ADMIN — Medication 100 MILLIGRAM(S): at 13:20

## 2018-01-08 RX ADMIN — Medication 100 MILLIGRAM(S): at 21:25

## 2018-01-08 RX ADMIN — LACOSAMIDE 140 MILLIGRAM(S): 50 TABLET ORAL at 05:48

## 2018-01-08 RX ADMIN — DEXTROSE MONOHYDRATE, SODIUM CHLORIDE, AND POTASSIUM CHLORIDE 50 MILLILITER(S): 50; .745; 4.5 INJECTION, SOLUTION INTRAVENOUS at 01:14

## 2018-01-08 RX ADMIN — SODIUM CHLORIDE 3 MILLILITER(S): 9 INJECTION INTRAMUSCULAR; INTRAVENOUS; SUBCUTANEOUS at 12:54

## 2018-01-08 RX ADMIN — ATORVASTATIN CALCIUM 80 MILLIGRAM(S): 80 TABLET, FILM COATED ORAL at 21:21

## 2018-01-08 RX ADMIN — AMLODIPINE BESYLATE 5 MILLIGRAM(S): 2.5 TABLET ORAL at 05:13

## 2018-01-08 RX ADMIN — FOSPHENYTOIN 105 MILLIGRAM(S) PE: 50 INJECTION INTRAMUSCULAR; INTRAVENOUS at 18:40

## 2018-01-08 RX ADMIN — FOSPHENYTOIN 105 MILLIGRAM(S) PE: 50 INJECTION INTRAMUSCULAR; INTRAVENOUS at 01:14

## 2018-01-08 RX ADMIN — FOSPHENYTOIN 105 MILLIGRAM(S) PE: 50 INJECTION INTRAMUSCULAR; INTRAVENOUS at 11:42

## 2018-01-08 RX ADMIN — LACOSAMIDE 140 MILLIGRAM(S): 50 TABLET ORAL at 17:50

## 2018-01-08 RX ADMIN — HEPARIN SODIUM 5000 UNIT(S): 5000 INJECTION INTRAVENOUS; SUBCUTANEOUS at 17:52

## 2018-01-08 RX ADMIN — Medication 1 APPLICATION(S): at 11:43

## 2018-01-08 RX ADMIN — DEXTROSE MONOHYDRATE, SODIUM CHLORIDE, AND POTASSIUM CHLORIDE 50 MILLILITER(S): 50; .745; 4.5 INJECTION, SOLUTION INTRAVENOUS at 21:20

## 2018-01-08 RX ADMIN — Medication 1 TABLET(S): at 11:43

## 2018-01-08 RX ADMIN — LEVETIRACETAM 400 MILLIGRAM(S): 250 TABLET, FILM COATED ORAL at 20:11

## 2018-01-08 RX ADMIN — Medication 100 MILLIGRAM(S): at 05:13

## 2018-01-08 RX ADMIN — LEVETIRACETAM 400 MILLIGRAM(S): 250 TABLET, FILM COATED ORAL at 08:02

## 2018-01-08 RX ADMIN — SODIUM CHLORIDE 3 MILLILITER(S): 9 INJECTION INTRAMUSCULAR; INTRAVENOUS; SUBCUTANEOUS at 05:30

## 2018-01-08 RX ADMIN — Medication 81 MILLIGRAM(S): at 11:43

## 2018-01-08 RX ADMIN — HEPARIN SODIUM 5000 UNIT(S): 5000 INJECTION INTRAVENOUS; SUBCUTANEOUS at 05:13

## 2018-01-08 NOTE — PROGRESS NOTE ADULT - PROBLEM SELECTOR PLAN 2
Pt not septic now  leucocytosis and low grade fever, may be secondary to recent PEG, CT-c/a/p no acute pathology  short course of empiric abx

## 2018-01-08 NOTE — PROGRESS NOTE ADULT - SUBJECTIVE AND OBJECTIVE BOX
Patient is a 77y old  Male who presents with a chief complaint of Seizure x 1 day (16 Dec 2017 01:26)      SUBJECTIVE / OVERNIGHT EVENTS:  Pt denied complaints.     MEDICATIONS  (STANDING):  amLODIPine   Tablet 5 milliGRAM(s) Oral daily  aspirin  chewable 81 milliGRAM(s) Enteral Tube daily  atorvastatin 80 milliGRAM(s) Oral at bedtime  BACItracin   Ointment 1 Application(s) Topical daily  ceFAZolin   IVPB      ceFAZolin   IVPB 2000 milliGRAM(s) IV Intermittent every 8 hours  dextrose 5% + sodium chloride 0.9% with potassium chloride 20 mEq/L 1000 milliLiter(s) (50 mL/Hr) IV Continuous <Continuous>  fosphenytoin IVPB 125 milliGRAM(s) PE IV Intermittent every 8 hours  heparin  Injectable 5000 Unit(s) SubCutaneous every 12 hours  lacosamide IVPB 200 milliGRAM(s) IV Intermittent every 12 hours  levETIRAcetam  IVPB 1500 milliGRAM(s) IV Intermittent every 12 hours  multivitamin 1 Tablet(s) Oral daily  sodium chloride 0.9% lock flush 3 milliLiter(s) IV Push every 8 hours    MEDICATIONS  (PRN):  acetaminophen  Suppository 650 milliGRAM(s) Rectal every 6 hours PRN For Temp greater than 38 C (100.4 F)  sodium chloride 0.65% Nasal 1 Spray(s) Both Nostrils four times a day PRN Congestion      T(C): 37.2 (01-08-18 @ 13:42), Max: 37.2 (01-08-18 @ 13:42)  HR: 74 (01-08-18 @ 13:42) (67 - 74)  BP: 129/72 (01-08-18 @ 13:42) (109/58 - 129/72)  RR: 18 (01-08-18 @ 13:42) (17 - 18)  SpO2: 98% (01-08-18 @ 13:42) (97% - 99%)  CAPILLARY BLOOD GLUCOSE        I&O's Summary      PHYSICAL EXAM:  GENERAL: NAD, well-developed  HEAD:  Atraumatic, Normocephalic  EYES: EOMI, PERRLA, conjunctiva and sclera clear  NECK: Supple, No JVD  CHEST/LUNG: Clear to auscultation bilaterally; No wheeze  HEART: s1 s2, regular rhythm and rate   ABDOMEN: Soft, Nontender, Nondistended; Bowel sounds present  EXTREMITIES:  2+ Peripheral Pulses, No clubbing, cyanosis, or edema  PSYCH: AAOx3, calm   NEUROLOGY: non-focal  SKIN: No rashes or lesions    LABS:                        11.4   13.70 )-----------( 424      ( 08 Jan 2018 05:29 )             36.0     01-08    135  |  100  |  15  ----------------------------<  134<H>  4.8   |  23  |  0.67    Ca    8.4      08 Jan 2018 05:29  Phos  2.8     01-08  Mg     1.7     01-08    TPro  6.4  /  Alb  2.0<L>  /  TBili  0.3  /  DBili  x   /  AST  122<H>  /  ALT  34  /  AlkPhos  221<H>  01-08    PT/INR - ( 08 Jan 2018 08:10 )   PT: 12.7 SEC;   INR: 1.14          PTT - ( 08 Jan 2018 08:10 )  PTT:23.2 SEC          RADIOLOGY & ADDITIONAL TESTS:    Imaging Personally Reviewed:< from: CT Chest w/ IV Cont (01.07.18 @ 18:26) >  IMPRESSION:   Probable dependent atelectasis.  No acute intra-abdominal pathology.    < end of copied text >      Consultant(s) Notes Reviewed:      Care Discussed with Consultants/Other Providers: Patient is a 77y old  Male who presents with a chief complaint of Seizure x 1 day (16 Dec 2017 01:26)      SUBJECTIVE / OVERNIGHT EVENTS:  Pt denied complaints.     MEDICATIONS  (STANDING):  amLODIPine   Tablet 5 milliGRAM(s) Oral daily  aspirin  chewable 81 milliGRAM(s) Enteral Tube daily  atorvastatin 80 milliGRAM(s) Oral at bedtime  BACItracin   Ointment 1 Application(s) Topical daily  ceFAZolin   IVPB      ceFAZolin   IVPB 2000 milliGRAM(s) IV Intermittent every 8 hours  dextrose 5% + sodium chloride 0.9% with potassium chloride 20 mEq/L 1000 milliLiter(s) (50 mL/Hr) IV Continuous <Continuous>  fosphenytoin IVPB 125 milliGRAM(s) PE IV Intermittent every 8 hours  heparin  Injectable 5000 Unit(s) SubCutaneous every 12 hours  lacosamide IVPB 200 milliGRAM(s) IV Intermittent every 12 hours  levETIRAcetam  IVPB 1500 milliGRAM(s) IV Intermittent every 12 hours  multivitamin 1 Tablet(s) Oral daily  sodium chloride 0.9% lock flush 3 milliLiter(s) IV Push every 8 hours    MEDICATIONS  (PRN):  acetaminophen  Suppository 650 milliGRAM(s) Rectal every 6 hours PRN For Temp greater than 38 C (100.4 F)  sodium chloride 0.65% Nasal 1 Spray(s) Both Nostrils four times a day PRN Congestion      T(C): 37.2 (01-08-18 @ 13:42), Max: 37.2 (01-08-18 @ 13:42)  HR: 74 (01-08-18 @ 13:42) (67 - 74)  BP: 129/72 (01-08-18 @ 13:42) (109/58 - 129/72)  RR: 18 (01-08-18 @ 13:42) (17 - 18)  SpO2: 98% (01-08-18 @ 13:42) (97% - 99%)  CAPILLARY BLOOD GLUCOSE        I&O's Summary      PHYSICAL EXAM  GENERAL: NAD  HEAD:  Atraumatic, Normocephalic  NECK: Supple, No JVD  CHEST/LUNG: Clear to auscultation bilaterally; No wheeze  HEART: Regular rate and rhythm; No murmurs, rubs, or gallops  ABDOMEN: Soft, Nontender, Nondistended; Bowel sounds present, PEG +  EXTREMITIES:  Contracted, No clubbing, cyanosis, or edema  PSYCH: awake, alert  SKIN: No rashes or lesions    LABS:                        11.4   13.70 )-----------( 424      ( 08 Jan 2018 05:29 )             36.0     01-08    135  |  100  |  15  ----------------------------<  134<H>  4.8   |  23  |  0.67    Ca    8.4      08 Jan 2018 05:29  Phos  2.8     01-08  Mg     1.7     01-08    TPro  6.4  /  Alb  2.0<L>  /  TBili  0.3  /  DBili  x   /  AST  122<H>  /  ALT  34  /  AlkPhos  221<H>  01-08    PT/INR - ( 08 Jan 2018 08:10 )   PT: 12.7 SEC;   INR: 1.14          PTT - ( 08 Jan 2018 08:10 )  PTT:23.2 SEC          RADIOLOGY & ADDITIONAL TESTS:    Imaging Personally Reviewed:< from: CT Chest w/ IV Cont (01.07.18 @ 18:26) >  IMPRESSION:   Probable dependent atelectasis.  No acute intra-abdominal pathology.    < end of copied text >      Consultant(s) Notes Reviewed:      Care Discussed with Consultants/Other Providers:

## 2018-01-09 DIAGNOSIS — F95.9 TIC DISORDER, UNSPECIFIED: ICD-10-CM

## 2018-01-09 LAB
ALBUMIN SERPL ELPH-MCNC: 2.1 G/DL — LOW (ref 3.3–5)
ALP SERPL-CCNC: 218 U/L — HIGH (ref 40–120)
ALT FLD-CCNC: 73 U/L — HIGH (ref 4–41)
APTT BLD: 25.9 SEC — LOW (ref 27.5–37.4)
AST SERPL-CCNC: 244 U/L — HIGH (ref 4–40)
BASOPHILS # BLD AUTO: 0.16 K/UL — SIGNIFICANT CHANGE UP (ref 0–0.2)
BASOPHILS NFR BLD AUTO: 1.1 % — SIGNIFICANT CHANGE UP (ref 0–2)
BILIRUB SERPL-MCNC: 0.3 MG/DL — SIGNIFICANT CHANGE UP (ref 0.2–1.2)
BUN SERPL-MCNC: 16 MG/DL — SIGNIFICANT CHANGE UP (ref 7–23)
CALCIUM SERPL-MCNC: 8.6 MG/DL — SIGNIFICANT CHANGE UP (ref 8.4–10.5)
CHLORIDE SERPL-SCNC: 102 MMOL/L — SIGNIFICANT CHANGE UP (ref 98–107)
CO2 SERPL-SCNC: 24 MMOL/L — SIGNIFICANT CHANGE UP (ref 22–31)
CREAT SERPL-MCNC: 0.68 MG/DL — SIGNIFICANT CHANGE UP (ref 0.5–1.3)
EOSINOPHIL # BLD AUTO: 0.95 K/UL — HIGH (ref 0–0.5)
EOSINOPHIL NFR BLD AUTO: 6.5 % — HIGH (ref 0–6)
GLUCOSE SERPL-MCNC: 110 MG/DL — HIGH (ref 70–99)
HCT VFR BLD CALC: 33 % — LOW (ref 39–50)
HGB BLD-MCNC: 10.5 G/DL — LOW (ref 13–17)
IMM GRANULOCYTES # BLD AUTO: 0.19 # — SIGNIFICANT CHANGE UP
IMM GRANULOCYTES NFR BLD AUTO: 1.3 % — SIGNIFICANT CHANGE UP (ref 0–1.5)
INR BLD: 1.1 — SIGNIFICANT CHANGE UP (ref 0.88–1.17)
LYMPHOCYTES # BLD AUTO: 1.39 K/UL — SIGNIFICANT CHANGE UP (ref 1–3.3)
LYMPHOCYTES # BLD AUTO: 9.5 % — LOW (ref 13–44)
MAGNESIUM SERPL-MCNC: 1.7 MG/DL — SIGNIFICANT CHANGE UP (ref 1.6–2.6)
MCHC RBC-ENTMCNC: 28.5 PG — SIGNIFICANT CHANGE UP (ref 27–34)
MCHC RBC-ENTMCNC: 31.8 % — LOW (ref 32–36)
MCV RBC AUTO: 89.7 FL — SIGNIFICANT CHANGE UP (ref 80–100)
MONOCYTES # BLD AUTO: 0.94 K/UL — HIGH (ref 0–0.9)
MONOCYTES NFR BLD AUTO: 6.4 % — SIGNIFICANT CHANGE UP (ref 2–14)
NEUTROPHILS # BLD AUTO: 10.96 K/UL — HIGH (ref 1.8–7.4)
NEUTROPHILS NFR BLD AUTO: 75.2 % — SIGNIFICANT CHANGE UP (ref 43–77)
NRBC # FLD: 0 — SIGNIFICANT CHANGE UP
PHOSPHATE SERPL-MCNC: 2.6 MG/DL — SIGNIFICANT CHANGE UP (ref 2.5–4.5)
PLATELET # BLD AUTO: 469 K/UL — HIGH (ref 150–400)
PMV BLD: 9 FL — SIGNIFICANT CHANGE UP (ref 7–13)
POTASSIUM SERPL-MCNC: 4.5 MMOL/L — SIGNIFICANT CHANGE UP (ref 3.5–5.3)
POTASSIUM SERPL-SCNC: 4.5 MMOL/L — SIGNIFICANT CHANGE UP (ref 3.5–5.3)
PROT SERPL-MCNC: 6.8 G/DL — SIGNIFICANT CHANGE UP (ref 6–8.3)
PROTHROM AB SERPL-ACNC: 12.7 SEC — SIGNIFICANT CHANGE UP (ref 9.8–13.1)
RBC # BLD: 3.68 M/UL — LOW (ref 4.2–5.8)
RBC # FLD: 14.6 % — HIGH (ref 10.3–14.5)
SODIUM SERPL-SCNC: 136 MMOL/L — SIGNIFICANT CHANGE UP (ref 135–145)
WBC # BLD: 14.59 K/UL — HIGH (ref 3.8–10.5)
WBC # FLD AUTO: 14.59 K/UL — HIGH (ref 3.8–10.5)

## 2018-01-09 PROCEDURE — 99233 SBSQ HOSP IP/OBS HIGH 50: CPT

## 2018-01-09 PROCEDURE — 99232 SBSQ HOSP IP/OBS MODERATE 35: CPT | Mod: GC

## 2018-01-09 RX ORDER — LACOSAMIDE 50 MG/1
200 TABLET ORAL
Qty: 0 | Refills: 0 | Status: DISCONTINUED | OUTPATIENT
Start: 2018-01-09 | End: 2018-01-11

## 2018-01-09 RX ORDER — LEVETIRACETAM 250 MG/1
1500 TABLET, FILM COATED ORAL
Qty: 0 | Refills: 0 | Status: DISCONTINUED | OUTPATIENT
Start: 2018-01-09 | End: 2018-01-18

## 2018-01-09 RX ORDER — LEVETIRACETAM 250 MG/1
1500 TABLET, FILM COATED ORAL
Qty: 0 | Refills: 0 | Status: DISCONTINUED | OUTPATIENT
Start: 2018-01-09 | End: 2018-01-09

## 2018-01-09 RX ORDER — LACOSAMIDE 50 MG/1
200 TABLET ORAL
Qty: 0 | Refills: 0 | Status: DISCONTINUED | OUTPATIENT
Start: 2018-01-09 | End: 2018-01-09

## 2018-01-09 RX ADMIN — SODIUM CHLORIDE 3 MILLILITER(S): 9 INJECTION INTRAMUSCULAR; INTRAVENOUS; SUBCUTANEOUS at 05:08

## 2018-01-09 RX ADMIN — Medication 1 APPLICATION(S): at 11:34

## 2018-01-09 RX ADMIN — LACOSAMIDE 140 MILLIGRAM(S): 50 TABLET ORAL at 05:43

## 2018-01-09 RX ADMIN — DEXTROSE MONOHYDRATE, SODIUM CHLORIDE, AND POTASSIUM CHLORIDE 50 MILLILITER(S): 50; .745; 4.5 INJECTION, SOLUTION INTRAVENOUS at 08:11

## 2018-01-09 RX ADMIN — Medication 1 TABLET(S): at 11:34

## 2018-01-09 RX ADMIN — ATORVASTATIN CALCIUM 80 MILLIGRAM(S): 80 TABLET, FILM COATED ORAL at 21:03

## 2018-01-09 RX ADMIN — Medication 100 MILLIGRAM(S): at 21:03

## 2018-01-09 RX ADMIN — FOSPHENYTOIN 105 MILLIGRAM(S) PE: 50 INJECTION INTRAMUSCULAR; INTRAVENOUS at 01:23

## 2018-01-09 RX ADMIN — AMLODIPINE BESYLATE 5 MILLIGRAM(S): 2.5 TABLET ORAL at 05:07

## 2018-01-09 RX ADMIN — HEPARIN SODIUM 5000 UNIT(S): 5000 INJECTION INTRAVENOUS; SUBCUTANEOUS at 05:08

## 2018-01-09 RX ADMIN — Medication 100 MILLIGRAM(S): at 05:07

## 2018-01-09 RX ADMIN — LEVETIRACETAM 1500 MILLIGRAM(S): 250 TABLET, FILM COATED ORAL at 19:10

## 2018-01-09 RX ADMIN — HEPARIN SODIUM 5000 UNIT(S): 5000 INJECTION INTRAVENOUS; SUBCUTANEOUS at 19:04

## 2018-01-09 RX ADMIN — Medication 81 MILLIGRAM(S): at 11:34

## 2018-01-09 RX ADMIN — LACOSAMIDE 200 MILLIGRAM(S): 50 TABLET ORAL at 21:48

## 2018-01-09 RX ADMIN — FOSPHENYTOIN 105 MILLIGRAM(S) PE: 50 INJECTION INTRAMUSCULAR; INTRAVENOUS at 10:07

## 2018-01-09 RX ADMIN — SODIUM CHLORIDE 3 MILLILITER(S): 9 INJECTION INTRAMUSCULAR; INTRAVENOUS; SUBCUTANEOUS at 12:50

## 2018-01-09 RX ADMIN — SODIUM CHLORIDE 3 MILLILITER(S): 9 INJECTION INTRAMUSCULAR; INTRAVENOUS; SUBCUTANEOUS at 00:02

## 2018-01-09 RX ADMIN — LEVETIRACETAM 400 MILLIGRAM(S): 250 TABLET, FILM COATED ORAL at 08:07

## 2018-01-09 NOTE — PROGRESS NOTE ADULT - PROBLEM SELECTOR PLAN 1
no recurrence of seizures.  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 No epileptic pattern or seizure seen.  MRI brain (12/26) abnormal signal involves the right cingulate gyrus-medial frontal parietal cortex as described. Some considerations include evolving   subacute ischemia, postictal cortical edema, or encephalitis.  c/w current regimen of AEDs (fosphenytoin, keppra, vimpat), f/u dilantin level  c/w ASA 81 mg and lipitor 80mg for possible subacute CVA  SZ precaution etiology unclear.   Neuro called for revisit

## 2018-01-09 NOTE — PROGRESS NOTE ADULT - ATTENDING COMMENTS
Patient was seen and examined with GI fellow at rounds. Agree with above.  ALT became elevated today in addition to abnormal AST and ALP.   Workup for abnormal liver tests in progress,   Will recommend avoid hepatotoxins

## 2018-01-09 NOTE — PROGRESS NOTE ADULT - PROBLEM SELECTOR PLAN 4
resolved  monitor electrolytes  hypophosphatemia: will supplement, f/u BMP in AM resolved  monitor BP

## 2018-01-09 NOTE — PROGRESS NOTE ADULT - PROBLEM SELECTOR PLAN 3
resolved  monitor BP Pt not septic now  leucocytosis and low grade fever, may be secondary to recent PEG, CT-c/a/p no acute pathology  short course of empiric abx  blood cx remains neg

## 2018-01-09 NOTE — PROGRESS NOTE ADULT - PROBLEM SELECTOR PLAN 9
DVT ppx: HSQ  fall/aspiration/seizure precautions abd US unremarkable , GI recommend hepatology eval  hepatology eval noted,   Hepatitis profile negative, may be drug induced, will monitor, avoid hepatotoxic drugs  CT a/p unremarkable

## 2018-01-09 NOTE — PROGRESS NOTE ADULT - SUBJECTIVE AND OBJECTIVE BOX
Patient is a 77y old  Male who presents with a chief complaint of Seizure x 1 day (16 Dec 2017 01:26)      SUBJECTIVE / OVERNIGHT EVENTS:  Pt denied complaints.     MEDICATIONS  (STANDING):  amLODIPine   Tablet 5 milliGRAM(s) Oral daily  aspirin  chewable 81 milliGRAM(s) Enteral Tube daily  atorvastatin 80 milliGRAM(s) Oral at bedtime  BACItracin   Ointment 1 Application(s) Topical daily  dextrose 5% + sodium chloride 0.9% with potassium chloride 20 mEq/L 1000 milliLiter(s) (50 mL/Hr) IV Continuous <Continuous>  fosphenytoin IVPB 125 milliGRAM(s) PE IV Intermittent every 8 hours  heparin  Injectable 5000 Unit(s) SubCutaneous every 12 hours  lacosamide IVPB 200 milliGRAM(s) IV Intermittent every 12 hours  levETIRAcetam  Solution 1500 milliGRAM(s) Enteral Tube two times a day  multivitamin 1 Tablet(s) Oral daily  sodium chloride 0.9% lock flush 3 milliLiter(s) IV Push every 8 hours    MEDICATIONS  (PRN):  acetaminophen  Suppository 650 milliGRAM(s) Rectal every 6 hours PRN For Temp greater than 38 C (100.4 F)  sodium chloride 0.65% Nasal 1 Spray(s) Both Nostrils four times a day PRN Congestion      T(C): 37.1 (01-09-18 @ 14:05), Max: 37.1 (01-09-18 @ 14:05)  HR: 70 (01-09-18 @ 14:05) (70 - 78)  BP: 134/79 (01-09-18 @ 14:05) (122/74 - 137/68)  RR: 18 (01-09-18 @ 14:05) (18 - 18)  SpO2: 97% (01-09-18 @ 14:05) (97% - 99%)  CAPILLARY BLOOD GLUCOSE        I&O's Summary    08 Jan 2018 07:01  -  09 Jan 2018 07:00  --------------------------------------------------------  IN: 800 mL / OUT: 0 mL / NET: 800 mL        PHYSICAL EXAM:  GENERAL: NAD, well-developed  HEAD:  Atraumatic, Normocephalic  EYES: EOMI, PERRLA, conjunctiva and sclera clear  NECK: Supple, No JVD  CHEST/LUNG: Clear to auscultation bilaterally; No wheeze  HEART: s1 s2, regular rhythm and rate   ABDOMEN: Soft, Nontender, Nondistended; Bowel sounds present  EXTREMITIES:  2+ Peripheral Pulses, No clubbing, cyanosis, or edema  PSYCH: AAOx3, calm   NEUROLOGY: non-focal  SKIN: No rashes or lesions    LABS:                        10.5   14.59 )-----------( 469      ( 09 Jan 2018 05:47 )             33.0     01-09    136  |  102  |  16  ----------------------------<  110<H>  4.5   |  24  |  0.68    Ca    8.6      09 Jan 2018 05:47  Phos  2.6     01-09  Mg     1.7     01-09    TPro  6.8  /  Alb  2.1<L>  /  TBili  0.3  /  DBili  x   /  AST  244<H>  /  ALT  73<H>  /  AlkPhos  218<H>  01-09    PT/INR - ( 09 Jan 2018 05:47 )   PT: 12.7 SEC;   INR: 1.10          PTT - ( 09 Jan 2018 05:47 )  PTT:25.9 SEC          RADIOLOGY & ADDITIONAL TESTS:    Imaging Personally Reviewed:    Consultant(s) Notes Reviewed:      Care Discussed with Consultants/Other Providers: Patient is a 77y old  Male who presents with a chief complaint of Seizure x 1 day (16 Dec 2017 01:26)      SUBJECTIVE / OVERNIGHT EVENTS:  Pt denied complaints.     MEDICATIONS  (STANDING):  amLODIPine   Tablet 5 milliGRAM(s) Oral daily  aspirin  chewable 81 milliGRAM(s) Enteral Tube daily  atorvastatin 80 milliGRAM(s) Oral at bedtime  BACItracin   Ointment 1 Application(s) Topical daily  dextrose 5% + sodium chloride 0.9% with potassium chloride 20 mEq/L 1000 milliLiter(s) (50 mL/Hr) IV Continuous <Continuous>  fosphenytoin IVPB 125 milliGRAM(s) PE IV Intermittent every 8 hours  heparin  Injectable 5000 Unit(s) SubCutaneous every 12 hours  lacosamide IVPB 200 milliGRAM(s) IV Intermittent every 12 hours  levETIRAcetam  Solution 1500 milliGRAM(s) Enteral Tube two times a day  multivitamin 1 Tablet(s) Oral daily  sodium chloride 0.9% lock flush 3 milliLiter(s) IV Push every 8 hours    MEDICATIONS  (PRN):  acetaminophen  Suppository 650 milliGRAM(s) Rectal every 6 hours PRN For Temp greater than 38 C (100.4 F)  sodium chloride 0.65% Nasal 1 Spray(s) Both Nostrils four times a day PRN Congestion      T(C): 37.1 (01-09-18 @ 14:05), Max: 37.1 (01-09-18 @ 14:05)  HR: 70 (01-09-18 @ 14:05) (70 - 78)  BP: 134/79 (01-09-18 @ 14:05) (122/74 - 137/68)  RR: 18 (01-09-18 @ 14:05) (18 - 18)  SpO2: 97% (01-09-18 @ 14:05) (97% - 99%)  CAPILLARY BLOOD GLUCOSE        I&O's Summary    08 Jan 2018 07:01  -  09 Jan 2018 07:00  --------------------------------------------------------  IN: 800 mL / OUT: 0 mL / NET: 800 mL        PHYSICAL EXAM  GENERAL: NAD  HEAD:  Atraumatic, Normocephalic  NECK: Supple, No JVD  CHEST/LUNG: Clear to auscultation bilaterally; No wheeze  HEART: Regular rate and rhythm; No murmurs, rubs, or gallops  ABDOMEN: Soft, Nontender, Nondistended; Bowel sounds present, PEG +  EXTREMITIES:  Contracted, No clubbing, cyanosis, or edema  PSYCH: awake, alert  SKIN: No rashes or lesions    LABS:                        10.5   14.59 )-----------( 469      ( 09 Jan 2018 05:47 )             33.0     01-09    136  |  102  |  16  ----------------------------<  110<H>  4.5   |  24  |  0.68    Ca    8.6      09 Jan 2018 05:47  Phos  2.6     01-09  Mg     1.7     01-09    TPro  6.8  /  Alb  2.1<L>  /  TBili  0.3  /  DBili  x   /  AST  244<H>  /  ALT  73<H>  /  AlkPhos  218<H>  01-09    PT/INR - ( 09 Jan 2018 05:47 )   PT: 12.7 SEC;   INR: 1.10          PTT - ( 09 Jan 2018 05:47 )  PTT:25.9 SEC          RADIOLOGY & ADDITIONAL TESTS:    Imaging Personally Reviewed:    Consultant(s) Notes Reviewed:      Care Discussed with Consultants/Other Providers:

## 2018-01-09 NOTE — PROGRESS NOTE ADULT - PROBLEM SELECTOR PLAN 6
- c/w supportive care, unable to care for himself adequately  -off Seroquel Pt failed s/s eval  s/p PEG placement and started on  PEG feeding , tolerating it well   nutrition  recommendations noted  aspiration precaution

## 2018-01-09 NOTE — PROGRESS NOTE ADULT - PROBLEM SELECTOR PLAN 2
Pt not septic now  leucocytosis and low grade fever, may be secondary to recent PEG, CT-c/a/p no acute pathology  short course of empiric abx  blood cx remains neg no recurrence of seizures.  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 No epileptic pattern or seizure seen.  MRI brain (12/26) abnormal signal involves the right cingulate gyrus-medial frontal parietal cortex as described. Some considerations include evolving   subacute ischemia, postictal cortical edema, or encephalitis.  c/w current regimen of AEDs (fosphenytoin, keppra, vimpat), f/u dilantin level  c/w ASA 81 mg and lipitor 80mg for possible subacute CVA  SZ precaution no recurrence of seizures.   AED switched to oral form after discussion with pharmacy. Neuro called for revisit.   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 No epileptic pattern or seizure seen.  MRI brain (12/26) abnormal signal involves the right cingulate gyrus-medial frontal parietal cortex as described. Some considerations include evolving   subacute ischemia, postictal cortical edema, or encephalitis.  c/w current regimen of AEDs (fosphenytoin, keppra, vimpat), f/u dilantin level  c/w ASA 81 mg and lipitor 80mg for possible subacute CVA  SZ precaution

## 2018-01-09 NOTE — PROGRESS NOTE ADULT - SUBJECTIVE AND OBJECTIVE BOX
HEPATOLOGY    Chief Complaint:  Patient is a 77y old  Male who presents with a chief complaint of Seizure x 1 day (16 Dec 2017 01:26)      Interval Events: Pt feels well. No complaints currently.    Allergies:  No Known Allergies      Hospital Medications:  acetaminophen  Suppository 650 milliGRAM(s) Rectal every 6 hours PRN  amLODIPine   Tablet 5 milliGRAM(s) Oral daily  aspirin  chewable 81 milliGRAM(s) Enteral Tube daily  atorvastatin 80 milliGRAM(s) Oral at bedtime  BACItracin   Ointment 1 Application(s) Topical daily  dextrose 5% + sodium chloride 0.9% with potassium chloride 20 mEq/L 1000 milliLiter(s) IV Continuous <Continuous>  fosphenytoin IVPB 125 milliGRAM(s) PE IV Intermittent every 8 hours  heparin  Injectable 5000 Unit(s) SubCutaneous every 12 hours  lacosamide IVPB 200 milliGRAM(s) IV Intermittent every 12 hours  levETIRAcetam  IVPB 1500 milliGRAM(s) IV Intermittent every 12 hours  multivitamin 1 Tablet(s) Oral daily  sodium chloride 0.65% Nasal 1 Spray(s) Both Nostrils four times a day PRN  sodium chloride 0.9% lock flush 3 milliLiter(s) IV Push every 8 hours      PMHX/PSHX:  Dementia without behavioral disturbance, unspecified dementia type  No pertinent past medical history  No significant past surgical history      Family history:  No pertinent family history in first degree relatives      ROS:     no complaints, unable to obtain full ROS      PHYSICAL EXAM:     GENERAL:  No distress  HEENT: conjunctivae clear  CHEST:  Full & symmetric excursion, no increased effort  ABDOMEN:  Soft, non-tender, non-distended  EXTREMITIES:  no edema  SKIN:  Dry/warm  NEURO:  Alert    Vital Signs:  Vital Signs Last 24 Hrs  T(C): 36.7 (2018 05:06), Max: 37.2 (2018 13:42)  T(F): 98 (2018 05:06), Max: 98.9 (2018 13:42)  HR: 77 (2018 05:06) (74 - 78)  BP: 122/74 (2018 05:06) (122/74 - 137/68)  BP(mean): --  RR: 18 (2018 05:06) (18 - 18)  SpO2: 99% (2018 05:06) (98% - 99%)  Daily     Daily Weight in k (2018 05:06)    LABS:                        10.5   14.59 )-----------( 469      ( 2018 05:47 )             33.0         136  |  102  |  16  ----------------------------<  110<H>  4.5   |  24  |  0.68    Ca    8.6      2018 05:47  Phos  2.6       Mg     1.7         TPro  6.8  /  Alb  2.1<L>  /  TBili  0.3  /  DBili  x   /  AST  244<H>  /  ALT  73<H>  /  AlkPhos  218<H>      LIVER FUNCTIONS - ( 2018 05:47 )  Alb: 2.1 g/dL / Pro: 6.8 g/dL / ALK PHOS: 218 u/L / ALT: 73 u/L / AST: 244 u/L / GGT: x           PT/INR - ( 2018 05:47 )   PT: 12.7 SEC;   INR: 1.10          PTT - ( 2018 05:47 )  PTT:25.9 SEC        Imaging:

## 2018-01-09 NOTE — PROGRESS NOTE ADULT - PROBLEM SELECTOR PLAN 8
abd US unremarkable , GI recommend hepatology eval  hepatology eval noted,   Hepatitis profile negative, may be drug induced, will monitor, avoid hepatotoxic drugs  CT a/p unremarkable monitor BP

## 2018-01-09 NOTE — PROGRESS NOTE ADULT - ASSESSMENT
Impression:  1. Elevated liver enzymes- Pre dominantly AST elevation and ALP. DDX include DILI, sepsis, AIH  2. Seizures d/o on AEDs  3. Leukocytosis- workup still pending.    Plan:  1. Recommend checking ASMA, AMA, Immmunoglobulins,  Anti LKM, Anti SLA  2. Trend liver enzymes, coags  3. Consider holding AEDs to assess for DILI. Discuss with neurology on which ones he is able to come off Impression:  1. Elevated liver enzymes- Pre dominantly AST elevation and ALP. DDX include DILI, sepsis, AST from seizure disorder from muscle breakdown and not liver related,  AIH  2. Seizures d/o on AEDs  3. Leukocytosis- workup still pending.    Plan:  1. Recommend checking ASMA, AMA, Immmunoglobulins,  Anti LKM, Anti SLA  2. Check ALK isoenzymes and GGT  3. Trend liver enzymes, coags  4. Consider consolidating AEDs to limit hepatotoxicity. Fosphenytoin is typically the most hepatotoxic although Keppra and Vimpat may both cause it. Impression:  1. Elevated liver enzymes- Pre dominantly AST elevation and ALP. DDX include DILI, sepsis, AST from seizure disorder from muscle breakdown and not liver related,  AIH. However, ALT became elevated at 73 today.    2. Seizures d/o on AEDs  3. Leukocytosis- workup still pending.    Plan:  1. Recommend checking ASMA, AMA, Immmunoglobulins,  Anti LKM, Anti SLA  2. Check ALK isoenzymes and GGT  3. Trend liver enzymes, coags  4. Consider consolidating AEDs to avoid hepatotoxicity. Fosphenytoin is typically the most hepatotoxic although Keppra and Vimpat may both cause it.

## 2018-01-09 NOTE — PROGRESS NOTE ADULT - PROBLEM SELECTOR PLAN 5
Pt failed s/s eval  s/p PEG placement and started on  PEG feeding , tolerating it well   nutrition  recommendations noted  aspiration precaution resolved  monitor electrolytes  hypophosphatemia: will supplement, f/u BMP in AM

## 2018-01-10 LAB
ALBUMIN SERPL ELPH-MCNC: 2.3 G/DL — LOW (ref 3.3–5)
ALP SERPL-CCNC: 230 U/L — HIGH (ref 40–120)
ALT FLD-CCNC: 193 U/L — HIGH (ref 4–41)
AST SERPL-CCNC: 463 U/L — HIGH (ref 4–40)
BACTERIA BLD CULT: SIGNIFICANT CHANGE UP
BACTERIA BLD CULT: SIGNIFICANT CHANGE UP
BASOPHILS # BLD AUTO: 0.15 K/UL — SIGNIFICANT CHANGE UP (ref 0–0.2)
BASOPHILS NFR BLD AUTO: 1.2 % — SIGNIFICANT CHANGE UP (ref 0–2)
BILIRUB SERPL-MCNC: 0.3 MG/DL — SIGNIFICANT CHANGE UP (ref 0.2–1.2)
BUN SERPL-MCNC: 20 MG/DL — SIGNIFICANT CHANGE UP (ref 7–23)
CALCIUM SERPL-MCNC: 8.5 MG/DL — SIGNIFICANT CHANGE UP (ref 8.4–10.5)
CHLORIDE SERPL-SCNC: 101 MMOL/L — SIGNIFICANT CHANGE UP (ref 98–107)
CO2 SERPL-SCNC: 23 MMOL/L — SIGNIFICANT CHANGE UP (ref 22–31)
CREAT SERPL-MCNC: 0.74 MG/DL — SIGNIFICANT CHANGE UP (ref 0.5–1.3)
EOSINOPHIL # BLD AUTO: 0.97 K/UL — HIGH (ref 0–0.5)
EOSINOPHIL NFR BLD AUTO: 7.5 % — HIGH (ref 0–6)
GGT SERPL-CCNC: 380 U/L — HIGH (ref 8–61)
GLUCOSE SERPL-MCNC: 143 MG/DL — HIGH (ref 70–99)
HCT VFR BLD CALC: 32.3 % — LOW (ref 39–50)
HGB BLD-MCNC: 10.9 G/DL — LOW (ref 13–17)
IGA FLD-MCNC: 667 MG/DL — HIGH (ref 70–400)
IGG FLD-MCNC: 1771 MG/DL — HIGH (ref 700–1600)
IGM SERPL-MCNC: 66 MG/DL — SIGNIFICANT CHANGE UP (ref 40–230)
IMM GRANULOCYTES # BLD AUTO: 0.24 # — SIGNIFICANT CHANGE UP
IMM GRANULOCYTES NFR BLD AUTO: 1.8 % — HIGH (ref 0–1.5)
INR BLD: 1.12 — SIGNIFICANT CHANGE UP (ref 0.88–1.17)
LYMPHOCYTES # BLD AUTO: 1.41 K/UL — SIGNIFICANT CHANGE UP (ref 1–3.3)
LYMPHOCYTES # BLD AUTO: 10.9 % — LOW (ref 13–44)
MAGNESIUM SERPL-MCNC: 2.4 MG/DL — SIGNIFICANT CHANGE UP (ref 1.6–2.6)
MCHC RBC-ENTMCNC: 30.2 PG — SIGNIFICANT CHANGE UP (ref 27–34)
MCHC RBC-ENTMCNC: 33.7 % — SIGNIFICANT CHANGE UP (ref 32–36)
MCV RBC AUTO: 89.5 FL — SIGNIFICANT CHANGE UP (ref 80–100)
MONOCYTES # BLD AUTO: 0.95 K/UL — HIGH (ref 0–0.9)
MONOCYTES NFR BLD AUTO: 7.3 % — SIGNIFICANT CHANGE UP (ref 2–14)
NEUTROPHILS # BLD AUTO: 9.27 K/UL — HIGH (ref 1.8–7.4)
NEUTROPHILS NFR BLD AUTO: 71.3 % — SIGNIFICANT CHANGE UP (ref 43–77)
NRBC # FLD: 0 — SIGNIFICANT CHANGE UP
PHOSPHATE SERPL-MCNC: 2.9 MG/DL — SIGNIFICANT CHANGE UP (ref 2.5–4.5)
PLATELET # BLD AUTO: 468 K/UL — HIGH (ref 150–400)
PMV BLD: 8.9 FL — SIGNIFICANT CHANGE UP (ref 7–13)
POTASSIUM SERPL-MCNC: 4.3 MMOL/L — SIGNIFICANT CHANGE UP (ref 3.5–5.3)
POTASSIUM SERPL-SCNC: 4.3 MMOL/L — SIGNIFICANT CHANGE UP (ref 3.5–5.3)
PROT SERPL-MCNC: 7.1 G/DL — SIGNIFICANT CHANGE UP (ref 6–8.3)
PROTHROM AB SERPL-ACNC: 12.9 SEC — SIGNIFICANT CHANGE UP (ref 9.8–13.1)
RBC # BLD: 3.61 M/UL — LOW (ref 4.2–5.8)
RBC # FLD: 14.3 % — SIGNIFICANT CHANGE UP (ref 10.3–14.5)
SODIUM SERPL-SCNC: 135 MMOL/L — SIGNIFICANT CHANGE UP (ref 135–145)
WBC # BLD: 12.99 K/UL — HIGH (ref 3.8–10.5)
WBC # FLD AUTO: 12.99 K/UL — HIGH (ref 3.8–10.5)

## 2018-01-10 PROCEDURE — 99233 SBSQ HOSP IP/OBS HIGH 50: CPT

## 2018-01-10 PROCEDURE — 99232 SBSQ HOSP IP/OBS MODERATE 35: CPT | Mod: GC

## 2018-01-10 RX ORDER — HYDROCORTISONE 1 %
1 OINTMENT (GRAM) TOPICAL
Qty: 0 | Refills: 0 | Status: DISCONTINUED | OUTPATIENT
Start: 2018-01-10 | End: 2018-01-18

## 2018-01-10 RX ADMIN — SODIUM CHLORIDE 3 MILLILITER(S): 9 INJECTION INTRAMUSCULAR; INTRAVENOUS; SUBCUTANEOUS at 21:05

## 2018-01-10 RX ADMIN — SODIUM CHLORIDE 3 MILLILITER(S): 9 INJECTION INTRAMUSCULAR; INTRAVENOUS; SUBCUTANEOUS at 05:47

## 2018-01-10 RX ADMIN — Medication 81 MILLIGRAM(S): at 11:05

## 2018-01-10 RX ADMIN — LACOSAMIDE 200 MILLIGRAM(S): 50 TABLET ORAL at 21:05

## 2018-01-10 RX ADMIN — LEVETIRACETAM 1500 MILLIGRAM(S): 250 TABLET, FILM COATED ORAL at 17:46

## 2018-01-10 RX ADMIN — AMLODIPINE BESYLATE 5 MILLIGRAM(S): 2.5 TABLET ORAL at 05:04

## 2018-01-10 RX ADMIN — Medication 1 APPLICATION(S): at 18:03

## 2018-01-10 RX ADMIN — Medication 1 TABLET(S): at 11:05

## 2018-01-10 RX ADMIN — Medication 1 APPLICATION(S): at 17:45

## 2018-01-10 RX ADMIN — ATORVASTATIN CALCIUM 80 MILLIGRAM(S): 80 TABLET, FILM COATED ORAL at 21:04

## 2018-01-10 RX ADMIN — HEPARIN SODIUM 5000 UNIT(S): 5000 INJECTION INTRAVENOUS; SUBCUTANEOUS at 05:04

## 2018-01-10 RX ADMIN — HEPARIN SODIUM 5000 UNIT(S): 5000 INJECTION INTRAVENOUS; SUBCUTANEOUS at 17:46

## 2018-01-10 RX ADMIN — Medication 100 MILLIGRAM(S): at 05:04

## 2018-01-10 RX ADMIN — SODIUM CHLORIDE 3 MILLILITER(S): 9 INJECTION INTRAMUSCULAR; INTRAVENOUS; SUBCUTANEOUS at 13:23

## 2018-01-10 RX ADMIN — SODIUM CHLORIDE 3 MILLILITER(S): 9 INJECTION INTRAMUSCULAR; INTRAVENOUS; SUBCUTANEOUS at 00:46

## 2018-01-10 RX ADMIN — LACOSAMIDE 200 MILLIGRAM(S): 50 TABLET ORAL at 11:07

## 2018-01-10 RX ADMIN — LEVETIRACETAM 1500 MILLIGRAM(S): 250 TABLET, FILM COATED ORAL at 05:04

## 2018-01-10 NOTE — PROGRESS NOTE ADULT - PROBLEM SELECTOR PLAN 9
abd US unremarkable , GI recommend hepatology eval  hepatology eval noted,   Hepatitis profile negative, may be drug induced,  dilantin Dced,   , avoid hepatotoxic drugs  CT a/p unremarkable  GI f/u noted, will f/u AMA, ASMA, Anti LKM and Anti SLA ab

## 2018-01-10 NOTE — CHART NOTE - NSCHARTNOTEFT_GEN_A_CORE
NUTRITION FOLLOW-UP:  Pt. confused/disoriented.  Per RN, Pt. tolerating current enteral regimen at previously recommended & prescribed goal of Jevity 1.2 via PEG @ 63mL/hr.  No stated/noted nausea/vomiting/diarrhea/constipation at this time.  Possible, although questionable 7.5kg increase during admission.    Weight:  149.9lbs [68kg] on 1/10/18                            [61.5kg] on 12/19/17    Edema: None noted.    Skin: No pressure ulcers.      Pertinent Medications: MEDICATIONS  (STANDING):  amLODIPine   Tablet 5 milliGRAM(s) Oral daily  aspirin  chewable 81 milliGRAM(s) Enteral Tube daily  atorvastatin 80 milliGRAM(s) Oral at bedtime  BACItracin   Ointment 1 Application(s) Topical daily  heparin  Injectable 5000 Unit(s) SubCutaneous every 12 hours  lacosamide Solution 200 milliGRAM(s) Oral two times a day  levETIRAcetam  Solution 1500 milliGRAM(s) Enteral Tube two times a day  multivitamin 1 Tablet(s) Oral daily  phenytoin   Suspension 100 milliGRAM(s) Oral three times a day  sodium chloride 0.9% lock flush 3 milliLiter(s) IV Push every 8 hours    MEDICATIONS  (PRN):  acetaminophen  Suppository 650 milliGRAM(s) Rectal every 6 hours PRN For Temp greater than 38 C (100.4 F)  sodium chloride 0.65% Nasal 1 Spray(s) Both Nostrils four times a day PRN Congestion    Pertinent Labs:  01-10 Na135 mmol/L Glu 143 mg/dL<H> K+ 4.3 mmol/L Cr  0.74 mg/dL BUN 20 mg/dL Phos 2.9 mg/dL Alb 2.3 g/dL<L> PAB n/a         Current Diet:  Jevity 1.2 via PEG @ goal of 63mL/hr x 24hrs.   [provides 1892KCals & 83g protein, daily].      Nutrition Dx: Pt. remains severely malnourished.       PLAN/RECOMMENDATIONS:    1) Continue current enteral regimen.  Monitor tolerance.  Keep HOB >45 degrees.  2) Obtain weekly weights.  3) RDN remains available and will f/u PRN.            Verónica Stevenson, AMY, CDN pager [phone]

## 2018-01-10 NOTE — PROGRESS NOTE ADULT - ATTENDING COMMENTS
Patient discussed with resident team, not personally seen. Agree with above note. Would recommend discontinuation of dilantin given elevated LFTs. As patient is on two seizure medications in addition to dilantin, risk of recurrence of seizures remains relatively low, but please call neurology service if any seizure activity is noticed.

## 2018-01-10 NOTE — PROGRESS NOTE ADULT - ATTENDING COMMENTS
Patient was seen and examined with GI fellow during rounds. Agree with above.  Worsening abnormal liver tests, INR normal.   Workup for abnormal liver tests in progress  Will recommend  -avoid hepatooxins  -trend liver tests

## 2018-01-10 NOTE — PROGRESS NOTE ADULT - PROBLEM SELECTOR PLAN 7
- c/w supportive care, unable to care for himself adequately  -off Seroquel  -Severe protein calorie malnutrition  c/w PEG feeding

## 2018-01-10 NOTE — PROGRESS NOTE ADULT - PROBLEM SELECTOR PLAN 2
no recurrence of seizures.   AED switched to oral form after discussion with pharmacy. Neuro called for revisit.   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 No epileptic pattern or seizure seen.  MRI brain (12/26) abnormal signal involves the right cingulate gyrus-medial frontal parietal cortex as described. Some considerations include evolving   subacute ischemia, postictal cortical edema, or encephalitis.  neurology f/u noted, dilantin Dced as concern for hepatoxicity ,c/w keppra and Vimpat   c/w ASA 81 mg and Lipitor 80mg for possible subacute CVA  SZ precaution

## 2018-01-10 NOTE — PROGRESS NOTE ADULT - ASSESSMENT
Impression:  1. Elevated liver enzymes- Predominantly hepatocellular pattern of liver injury. DDX include DILI, sepsis > AIH. Fosphenytoin, keppra and lacosemide have all been associated with liver injury.  2. Seizures d/o on AEDs  3. Leukocytosis- workup in process    Plan:  1. Recommend checking ASMA, AMA, Immmunoglobulins,  Anti LKM, Anti SLA  2. Trend liver enzymes, coags  3. Consider consolidating AEDs to avoid hepatotoxicity. Fosphenytoin is typically the most hepatotoxic although Keppra and Vimpat may both cause it.  4. Avoid hepatoxins Impression:  1. Elevated liver enzymes- Predominantly hepatocellular pattern of liver injury. DDX include DILI, sepsis > AIH. Fosphenytoin, keppra and lacosemide have all been associated with liver injury.  2. Seizures d/o on AEDs  3. Leukocytosis- workup in process    Plan:  1. Recommend checking ASMA, AMA, Immmunoglobulins,  Anti LKM, Anti SLA  2. Trend liver enzymes, coags  3. Hold statin  4. Avoid hepatoxins Impression:  1. Abnormal liver tests continue to worsen- redominantly hepatocellular pattern of liver injury. DDX include DILI, sepsis > AIH. Fosphenytoin, keppra and lacosemide have all been associated with liver injury. Workup for other cause of abnormal liver tests is in progress  2. Seizures d/o on AEDs  3. Leukocytosis needs workup to rule out     Plan:  1. Recommend checking ASMA, AMA, Immmunoglobulins,  Anti LKM, Anti SLA  2. Trend liver enzymes, coags  3. Hold statin  4. Avoid hepatoxins

## 2018-01-10 NOTE — PROGRESS NOTE ADULT - PROBLEM SELECTOR PLAN 3
Pt not septic now  leucocytosis and low grade fever, may be secondary to recent PEG, CT-c/a/p no acute pathology  short course of empiric abx  blood cx remains neg

## 2018-01-10 NOTE — PROGRESS NOTE ADULT - SUBJECTIVE AND OBJECTIVE BOX
HEPATOLOGY    Chief Complaint:  Patient is a 77y old  Male who presents with a chief complaint of Seizure x 1 day (16 Dec 2017 01:26)      Interval Events:   - Pt feels well  - He denies abd pain  - No nausea or vomiting  - He is tolerating PO diet  - No complaints currently    Allergies:  No Known Allergies      Hospital Medications:  MEDICATIONS  (STANDING):  amLODIPine   Tablet 5 milliGRAM(s) Oral daily  aspirin  chewable 81 milliGRAM(s) Enteral Tube daily  atorvastatin 80 milliGRAM(s) Oral at bedtime  BACItracin   Ointment 1 Application(s) Topical daily  heparin  Injectable 5000 Unit(s) SubCutaneous every 12 hours  lacosamide Solution 200 milliGRAM(s) Oral two times a day  levETIRAcetam  Solution 1500 milliGRAM(s) Enteral Tube two times a day  multivitamin 1 Tablet(s) Oral daily  sodium chloride 0.9% lock flush 3 milliLiter(s) IV Push every 8 hours    MEDICATIONS  (PRN):  acetaminophen  Suppository 650 milliGRAM(s) Rectal every 6 hours PRN For Temp greater than 38 C (100.4 F)  sodium chloride 0.65% Nasal 1 Spray(s) Both Nostrils four times a day PRN Congestion        PMHX/PSHX:  Dementia without behavioral disturbance, unspecified dementia type  No pertinent past medical history  No significant past surgical history      Family history:  No pertinent family history in first degree relatives      ROS:     no complaints, unable to obtain full ROS      PHYSICAL EXAM:     GENERAL:  No distress  HEENT: conjunctivae clear  CHEST:  Full & symmetric excursion, no increased effort  ABDOMEN:  Soft, non-tender, non-distended  EXTREMITIES:  no edema  SKIN:  Dry/warm  NEURO:  Alert    Vital Signs:    Vital Signs Last 24 Hrs  T(C): 37 (10 Kyle 2018 05:02), Max: 37.1 (09 Jan 2018 14:05)  T(F): 98.6 (10 Kyle 2018 05:02), Max: 98.8 (09 Jan 2018 14:05)  HR: 72 (10 Kyle 2018 05:02) (70 - 72)  BP: 132/76 (10 Kyle 2018 05:02) (130/77 - 134/79)  BP(mean): --  RR: 18 (10 Kyle 2018 05:02) (18 - 18)  SpO2: 98% (10 Kyle 2018 05:02) (97% - 98%)    LABS:                                   10.9   12.99 )-----------( 468      ( 10 Kyle 2018 06:00 )             32.3     01-10    135  |  101  |  20  ----------------------------<  143<H>  4.3   |  23  |  0.74    Ca    8.5      10 Kyle 2018 06:00  Phos  2.9     01-10  Mg     2.4     01-10    TPro  7.1  /  Alb  2.3<L>  /  TBili  0.3  /  DBili  x   /  AST  463<H>  /  ALT  193<H>  /  AlkPhos  230<H>  01-10        Imaging: HEPATOLOGY    Chief Complaint:  Patient is a 77y old  Male who presents with a chief complaint of Seizure x 1 day (16 Dec 2017 01:26)      Interval Events:   - He denies abd pain  - No nausea or vomiting  - He is tolerating tube feeds  - No complaints currently    Allergies:  No Known Allergies      Hospital Medications:  MEDICATIONS  (STANDING):  amLODIPine   Tablet 5 milliGRAM(s) Oral daily  aspirin  chewable 81 milliGRAM(s) Enteral Tube daily  atorvastatin 80 milliGRAM(s) Oral at bedtime  BACItracin   Ointment 1 Application(s) Topical daily  heparin  Injectable 5000 Unit(s) SubCutaneous every 12 hours  lacosamide Solution 200 milliGRAM(s) Oral two times a day  levETIRAcetam  Solution 1500 milliGRAM(s) Enteral Tube two times a day  multivitamin 1 Tablet(s) Oral daily  sodium chloride 0.9% lock flush 3 milliLiter(s) IV Push every 8 hours    MEDICATIONS  (PRN):  acetaminophen  Suppository 650 milliGRAM(s) Rectal every 6 hours PRN For Temp greater than 38 C (100.4 F)  sodium chloride 0.65% Nasal 1 Spray(s) Both Nostrils four times a day PRN Congestion        PMHX/PSHX:  Dementia without behavioral disturbance, unspecified dementia type  No pertinent past medical history  No significant past surgical history      Family history:  No pertinent family history in first degree relatives      ROS:     no complaints, unable to obtain full ROS      PHYSICAL EXAM:     GENERAL:  No distress  HEENT: conjunctivae clear  CHEST:  Full & symmetric excursion, no increased effort  ABDOMEN:  Soft, non-tender, non-distended  EXTREMITIES:  no edema  SKIN:  Dry/warm  NEURO:  Alert    Vital Signs:    Vital Signs Last 24 Hrs  T(C): 37 (10 Kyle 2018 05:02), Max: 37.1 (09 Jan 2018 14:05)  T(F): 98.6 (10 Kyle 2018 05:02), Max: 98.8 (09 Jan 2018 14:05)  HR: 72 (10 Kyle 2018 05:02) (70 - 72)  BP: 132/76 (10 Kyle 2018 05:02) (130/77 - 134/79)  BP(mean): --  RR: 18 (10 Kyle 2018 05:02) (18 - 18)  SpO2: 98% (10 Kyle 2018 05:02) (97% - 98%)    LABS:                                   10.9   12.99 )-----------( 468      ( 10 Kyle 2018 06:00 )             32.3     01-10    135  |  101  |  20  ----------------------------<  143<H>  4.3   |  23  |  0.74    Ca    8.5      10 Kyle 2018 06:00  Phos  2.9     01-10  Mg     2.4     01-10    TPro  7.1  /  Alb  2.3<L>  /  TBili  0.3  /  DBili  x   /  AST  463<H>  /  ALT  193<H>  /  AlkPhos  230<H>  01-10        Imaging:

## 2018-01-10 NOTE — PROGRESS NOTE ADULT - SUBJECTIVE AND OBJECTIVE BOX
Subjective: Interval History - No events overnight    Objective:   Vital Signs Last 24 Hrs  T(C): 37 (10 Kyle 2018 05:02), Max: 37.1 (09 Jan 2018 14:05)  T(F): 98.6 (10 Kyle 2018 05:02), Max: 98.8 (09 Jan 2018 14:05)  HR: 72 (10 Kyle 2018 05:02) (70 - 72)  BP: 132/76 (10 Kyle 2018 05:02) (130/77 - 134/79)  RR: 18 (10 Kyle 2018 05:02) (18 - 18)  SpO2: 98% (10 Kyle 2018 05:02) (97% - 98%)    General Exam:   General appearance: No acute distress                   Neurological Exam:  Mental Status: Orientated to self. Does not know month or place.  Attention intact.  No dysarthria, aphasia or neglect.  Follows midline commands.    Cranial Nerves: PERRL, EOMI, no nystagmus.  Mild left facial droop. Hearing intact.  Tongue midline.    Motor:   Tone: Increased tone in b/l lower extremities          Strength: Moving RUE spontaneously. LUE antigravity. Able to wiggle toes in RLE however B/L lower extremities contracted                Tremor: LUE action tremor    Other:    01-10    135  |  101  |  20  ----------------------------<  143<H>  4.3   |  23  |  0.74    Ca    8.5      10 Kyle 2018 06:00  Phos  2.9     01-10  Mg     2.4     01-10    TPro  7.1  /  Alb  2.3<L>  /  TBili  0.3  /  DBili  x   /  AST  463<H>  /  ALT  193<H>  /  AlkPhos  230<H>  01-10    LIVER FUNCTIONS - ( 10 Kyle 2018 06:00 )  Alb: 2.3 g/dL / Pro: 7.1 g/dL / ALK PHOS: 230 u/L / ALT: 193 u/L / AST: 463 u/L / GGT: 380 u/L                  10.9   12.99 )-----------( 468      ( 10 Kyle 2018 06:00 )             32.3     MEDICATIONS  (STANDING):  amLODIPine   Tablet 5 milliGRAM(s) Oral daily  aspirin  chewable 81 milliGRAM(s) Enteral Tube daily  atorvastatin 80 milliGRAM(s) Oral at bedtime  BACItracin   Ointment 1 Application(s) Topical daily  heparin  Injectable 5000 Unit(s) SubCutaneous every 12 hours  lacosamide Solution 200 milliGRAM(s) Oral two times a day  levETIRAcetam  Solution 1500 milliGRAM(s) Enteral Tube two times a day  multivitamin 1 Tablet(s) Oral daily  phenytoin   Suspension 100 milliGRAM(s) Oral three times a day  sodium chloride 0.9% lock flush 3 milliLiter(s) IV Push every 8 hours    MEDICATIONS  (PRN):  acetaminophen  Suppository 650 milliGRAM(s) Rectal every 6 hours PRN For Temp greater than 38 C (100.4 F)  sodium chloride 0.65% Nasal 1 Spray(s) Both Nostrils four times a day PRN Congestion

## 2018-01-10 NOTE — PROGRESS NOTE ADULT - SUBJECTIVE AND OBJECTIVE BOX
Patient is a 77y old  Male who presents with a chief complaint of Seizure x 1 day (16 Dec 2017 01:26)      SUBJECTIVE / OVERNIGHT EVENTS: patient seen and examined by bedside, denies headache, dizziness, SOB, CP, Palpitations N/V/D, abdominal pain. pt moving bowels as per nursing staff         MEDICATIONS  (STANDING):  amLODIPine   Tablet 5 milliGRAM(s) Oral daily  aspirin  chewable 81 milliGRAM(s) Enteral Tube daily  atorvastatin 80 milliGRAM(s) Oral at bedtime  BACItracin   Ointment 1 Application(s) Topical daily  heparin  Injectable 5000 Unit(s) SubCutaneous every 12 hours  lacosamide Solution 200 milliGRAM(s) Oral two times a day  levETIRAcetam  Solution 1500 milliGRAM(s) Enteral Tube two times a day  multivitamin 1 Tablet(s) Oral daily  sodium chloride 0.9% lock flush 3 milliLiter(s) IV Push every 8 hours    MEDICATIONS  (PRN):  acetaminophen  Suppository 650 milliGRAM(s) Rectal every 6 hours PRN For Temp greater than 38 C (100.4 F)  sodium chloride 0.65% Nasal 1 Spray(s) Both Nostrils four times a day PRN Congestion      Vital Signs Last 24 Hrs  T(C): 37 (10 Kyle 2018 05:02), Max: 37 (10 Kyle 2018 05:02)  T(F): 98.6 (10 Kyle 2018 05:02), Max: 98.6 (10 Kyle 2018 05:02)  HR: 72 (10 Kyle 2018 05:02) (72 - 72)  BP: 132/76 (10 Kyle 2018 05:02) (130/77 - 132/76)  BP(mean): --  RR: 18 (10 Kyle 2018 05:02) (18 - 18)  SpO2: 98% (10 Kyle 2018 05:02) (97% - 98%)  CAPILLARY BLOOD GLUCOSE        I&O's Summary    PHYSICAL EXAM  GENERAL: NAD  HEAD:  Atraumatic, Normocephalic  NECK: Supple, No JVD  CHEST/LUNG: Clear to auscultation bilaterally; No wheeze  HEART: Regular rate and rhythm; No murmurs, rubs, or gallops  ABDOMEN: Soft, Nontender, Nondistended; Bowel sounds present, PEG +  EXTREMITIES:  Contracted, No clubbing, cyanosis, or edema  PSYCH: awake, alert  SKIN: No rashes or lesions        LABS:                        10.9   12.99 )-----------( 468      ( 10 Kyle 2018 06:00 )             32.3     01-10    135  |  101  |  20  ----------------------------<  143<H>  4.3   |  23  |  0.74    Ca    8.5      10 Kyle 2018 06:00  Phos  2.9     01-10  Mg     2.4     01-10    TPro  7.1  /  Alb  2.3<L>  /  TBili  0.3  /  DBili  x   /  AST  463<H>  /  ALT  193<H>  /  AlkPhos  230<H>  01-10    PT/INR - ( 10 Kyle 2018 06:00 )   PT: 12.9 SEC;   INR: 1.12          PTT - ( 09 Jan 2018 05:47 )  PTT:25.9 SEC          RADIOLOGY & ADDITIONAL TESTS:    Imaging Personally Reviewed:    Consultant(s) Notes Reviewed:  neurology , GI     Care Discussed with Consultants/Other Providers:

## 2018-01-10 NOTE — PROGRESS NOTE ADULT - ASSESSMENT
77 year old male currently admitted for status epilepticus which resolved and no clinical seizures noted. LFTs have been rising.  On neuro exam today, patient oriented only to self and can follow midline commands. Mild left facial droop. Increased tone in bilateral lower extremities and able to wiggle RLE toes. LUE moving more with action tremor.    Recommend:  Discontinue dilantin as last level was noted to be low  Continue Keppra 1500 BID  Continue vimpat 200 BID

## 2018-01-11 LAB
ALBUMIN SERPL ELPH-MCNC: 2.3 G/DL — LOW (ref 3.3–5)
ALP SERPL-CCNC: 229 U/L — HIGH (ref 40–120)
ALT FLD-CCNC: 267 U/L — HIGH (ref 4–41)
AST SERPL-CCNC: 469 U/L — HIGH (ref 4–40)
BASOPHILS # BLD AUTO: 0.14 K/UL — SIGNIFICANT CHANGE UP (ref 0–0.2)
BASOPHILS NFR BLD AUTO: 1.1 % — SIGNIFICANT CHANGE UP (ref 0–2)
BILIRUB SERPL-MCNC: 0.4 MG/DL — SIGNIFICANT CHANGE UP (ref 0.2–1.2)
BUN SERPL-MCNC: 23 MG/DL — SIGNIFICANT CHANGE UP (ref 7–23)
CALCIUM SERPL-MCNC: 8.9 MG/DL — SIGNIFICANT CHANGE UP (ref 8.4–10.5)
CHLORIDE SERPL-SCNC: 98 MMOL/L — SIGNIFICANT CHANGE UP (ref 98–107)
CO2 SERPL-SCNC: 24 MMOL/L — SIGNIFICANT CHANGE UP (ref 22–31)
CREAT SERPL-MCNC: 0.68 MG/DL — SIGNIFICANT CHANGE UP (ref 0.5–1.3)
EOSINOPHIL # BLD AUTO: 0.7 K/UL — HIGH (ref 0–0.5)
EOSINOPHIL NFR BLD AUTO: 5.4 % — SIGNIFICANT CHANGE UP (ref 0–6)
GLUCOSE SERPL-MCNC: 113 MG/DL — HIGH (ref 70–99)
HCT VFR BLD CALC: 34.6 % — LOW (ref 39–50)
HGB BLD-MCNC: 11.1 G/DL — LOW (ref 13–17)
IMM GRANULOCYTES # BLD AUTO: 0.29 # — SIGNIFICANT CHANGE UP
IMM GRANULOCYTES NFR BLD AUTO: 2.2 % — HIGH (ref 0–1.5)
INR BLD: 1.15 — SIGNIFICANT CHANGE UP (ref 0.88–1.17)
LYMPHOCYTES # BLD AUTO: 1.45 K/UL — SIGNIFICANT CHANGE UP (ref 1–3.3)
LYMPHOCYTES # BLD AUTO: 11.2 % — LOW (ref 13–44)
MAGNESIUM SERPL-MCNC: 1.9 MG/DL — SIGNIFICANT CHANGE UP (ref 1.6–2.6)
MCHC RBC-ENTMCNC: 28.7 PG — SIGNIFICANT CHANGE UP (ref 27–34)
MCHC RBC-ENTMCNC: 32.1 % — SIGNIFICANT CHANGE UP (ref 32–36)
MCV RBC AUTO: 89.4 FL — SIGNIFICANT CHANGE UP (ref 80–100)
MITOCHONDRIA AB SER-ACNC: SIGNIFICANT CHANGE UP
MONOCYTES # BLD AUTO: 0.94 K/UL — HIGH (ref 0–0.9)
MONOCYTES NFR BLD AUTO: 7.3 % — SIGNIFICANT CHANGE UP (ref 2–14)
NEUTROPHILS # BLD AUTO: 9.38 K/UL — HIGH (ref 1.8–7.4)
NEUTROPHILS NFR BLD AUTO: 72.8 % — SIGNIFICANT CHANGE UP (ref 43–77)
NRBC # FLD: 0 — SIGNIFICANT CHANGE UP
PHOSPHATE SERPL-MCNC: 3.5 MG/DL — SIGNIFICANT CHANGE UP (ref 2.5–4.5)
PLATELET # BLD AUTO: 516 K/UL — HIGH (ref 150–400)
PMV BLD: 8.9 FL — SIGNIFICANT CHANGE UP (ref 7–13)
POTASSIUM SERPL-MCNC: 4.5 MMOL/L — SIGNIFICANT CHANGE UP (ref 3.5–5.3)
POTASSIUM SERPL-SCNC: 4.5 MMOL/L — SIGNIFICANT CHANGE UP (ref 3.5–5.3)
PROT SERPL-MCNC: 7.4 G/DL — SIGNIFICANT CHANGE UP (ref 6–8.3)
PROTHROM AB SERPL-ACNC: 12.8 SEC — SIGNIFICANT CHANGE UP (ref 9.8–13.1)
RBC # BLD: 3.87 M/UL — LOW (ref 4.2–5.8)
RBC # FLD: 14.5 % — SIGNIFICANT CHANGE UP (ref 10.3–14.5)
SMOOTH MUSCLE AB SER-ACNC: SIGNIFICANT CHANGE UP
SODIUM SERPL-SCNC: 136 MMOL/L — SIGNIFICANT CHANGE UP (ref 135–145)
WBC # BLD: 12.9 K/UL — HIGH (ref 3.8–10.5)
WBC # FLD AUTO: 12.9 K/UL — HIGH (ref 3.8–10.5)

## 2018-01-11 PROCEDURE — 99231 SBSQ HOSP IP/OBS SF/LOW 25: CPT | Mod: GC

## 2018-01-11 PROCEDURE — 99232 SBSQ HOSP IP/OBS MODERATE 35: CPT | Mod: GC

## 2018-01-11 PROCEDURE — 99233 SBSQ HOSP IP/OBS HIGH 50: CPT

## 2018-01-11 RX ADMIN — Medication 1 APPLICATION(S): at 18:12

## 2018-01-11 RX ADMIN — SODIUM CHLORIDE 3 MILLILITER(S): 9 INJECTION INTRAMUSCULAR; INTRAVENOUS; SUBCUTANEOUS at 23:25

## 2018-01-11 RX ADMIN — Medication 1 TABLET(S): at 12:51

## 2018-01-11 RX ADMIN — LEVETIRACETAM 1500 MILLIGRAM(S): 250 TABLET, FILM COATED ORAL at 18:11

## 2018-01-11 RX ADMIN — Medication 1 APPLICATION(S): at 12:50

## 2018-01-11 RX ADMIN — HEPARIN SODIUM 5000 UNIT(S): 5000 INJECTION INTRAVENOUS; SUBCUTANEOUS at 05:51

## 2018-01-11 RX ADMIN — HEPARIN SODIUM 5000 UNIT(S): 5000 INJECTION INTRAVENOUS; SUBCUTANEOUS at 18:11

## 2018-01-11 RX ADMIN — Medication 1 APPLICATION(S): at 05:51

## 2018-01-11 RX ADMIN — AMLODIPINE BESYLATE 5 MILLIGRAM(S): 2.5 TABLET ORAL at 05:52

## 2018-01-11 RX ADMIN — LEVETIRACETAM 1500 MILLIGRAM(S): 250 TABLET, FILM COATED ORAL at 05:51

## 2018-01-11 RX ADMIN — SODIUM CHLORIDE 3 MILLILITER(S): 9 INJECTION INTRAMUSCULAR; INTRAVENOUS; SUBCUTANEOUS at 06:29

## 2018-01-11 RX ADMIN — Medication 81 MILLIGRAM(S): at 12:51

## 2018-01-11 RX ADMIN — SODIUM CHLORIDE 3 MILLILITER(S): 9 INJECTION INTRAMUSCULAR; INTRAVENOUS; SUBCUTANEOUS at 13:06

## 2018-01-11 NOTE — PROGRESS NOTE ADULT - PROBLEM SELECTOR PLAN 2
no recurrence of seizures.   AED switched to oral form after discussion with pharmacy. Neuro called for revisit.   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 No epileptic pattern or seizure seen.  MRI brain (12/26) abnormal signal involves the right cingulate gyrus-medial frontal parietal cortex as described. Some considerations include evolving   subacute ischemia, postictal cortical edema, or encephalitis.  neurology f/u noted, dilantin Dced as concern for hepatoxicity ,c/w keppra and Vimpat   GI recommend to hold all AED  if OK with neurology, case discussed with neuro, recommend c/w with Keppra and Vimpat at this time   c/w ASA 81 mg for possible subacute CVA, will DC Lipitor for now secondary to abnormal lfts   SZ precaution

## 2018-01-11 NOTE — PROGRESS NOTE ADULT - ATTENDING COMMENTS
Agree with above note. Patient has frequent movements of his right hand which do not appear to be seizures. Would not recommend any treatment for these movements. Examination as above, patient was examined with the resident team. Continue current AEDs and monitor clinically for seizures.

## 2018-01-11 NOTE — PROGRESS NOTE ADULT - SUBJECTIVE AND OBJECTIVE BOX
HEPATOLOGY    Chief Complaint:  Patient is a 77y old  Male who presents with a chief complaint of Seizure x 1 day (16 Dec 2017 01:26)      Interval Events:   - He denies abd pain  - No nausea or vomiting  - He is tolerating tube feeds  - No complaints currently    Allergies:  No Known Allergies      Hospital Medications:  MEDICATIONS  (STANDING):  amLODIPine   Tablet 5 milliGRAM(s) Oral daily  aspirin  chewable 81 milliGRAM(s) Enteral Tube daily  atorvastatin 80 milliGRAM(s) Oral at bedtime  BACItracin   Ointment 1 Application(s) Topical daily  heparin  Injectable 5000 Unit(s) SubCutaneous every 12 hours  lacosamide Solution 200 milliGRAM(s) Oral two times a day  levETIRAcetam  Solution 1500 milliGRAM(s) Enteral Tube two times a day  multivitamin 1 Tablet(s) Oral daily  sodium chloride 0.9% lock flush 3 milliLiter(s) IV Push every 8 hours    MEDICATIONS  (PRN):  acetaminophen  Suppository 650 milliGRAM(s) Rectal every 6 hours PRN For Temp greater than 38 C (100.4 F)  sodium chloride 0.65% Nasal 1 Spray(s) Both Nostrils four times a day PRN Congestion        PMHX/PSHX:  Dementia without behavioral disturbance, unspecified dementia type  No pertinent past medical history  No significant past surgical history      Family history:  No pertinent family history in first degree relatives      ROS:     no complaints, unable to obtain full ROS      PHYSICAL EXAM:     GENERAL:  No distress  HEENT: conjunctivae clear  CHEST:  Full & symmetric excursion, no increased effort  ABDOMEN:  Soft, non-tender, non-distended  EXTREMITIES:  no edema  SKIN:  Dry/warm  NEURO:  Alert    Vital Signs:    Vital Signs Last 24 Hrs  T(C): 37 (10 Kyle 2018 05:02), Max: 37.1 (09 Jan 2018 14:05)  T(F): 98.6 (10 Kyle 2018 05:02), Max: 98.8 (09 Jan 2018 14:05)  HR: 72 (10 Kyle 2018 05:02) (70 - 72)  BP: 132/76 (10 Kyle 2018 05:02) (130/77 - 134/79)  BP(mean): --  RR: 18 (10 Kyle 2018 05:02) (18 - 18)  SpO2: 98% (10 Kyle 2018 05:02) (97% - 98%)    LABS:                                              11.1   12.90 )-----------( 516      ( 11 Jan 2018 07:25 )             34.6     01-11    136  |  98  |  23  ----------------------------<  113<H>  4.5   |  24  |  0.68    Ca    8.9      11 Jan 2018 07:25  Phos  3.5     01-11  Mg     1.9     01-11    TPro  7.4  /  Alb  2.3<L>  /  TBili  0.4  /  DBili  x   /  AST  469<H>  /  ALT  267<H>  /  AlkPhos  229<H>  01-11      Imaging:

## 2018-01-11 NOTE — PROGRESS NOTE ADULT - PROBLEM SELECTOR PLAN 9
abd US unremarkable , GI recommend hepatology eval  hepatology eval noted,   Hepatitis profile negative, may be drug induced,  dilantin Dced,   , avoid hepatotoxic drugs, will DC lipitor   CT a/p unremarkable  GI f/u noted, will f/u AMA, ASMA, Anti LKM and Anti SLA ab  GI recommend repeating Abd U/S for sludge/obstruction

## 2018-01-11 NOTE — PROGRESS NOTE ADULT - PROBLEM SELECTOR PLAN 3
Pt not septic now  leucocytosis and low grade fever, may be secondary to recent PEG, CT-c/a/p no acute pathology  s/p short course of empiric abx  blood cx remains neg

## 2018-01-11 NOTE — PROGRESS NOTE ADULT - SUBJECTIVE AND OBJECTIVE BOX
Patient is a 77y old  Male who presents with a chief complaint of Seizure x 1 day (16 Dec 2017 01:26)      SUBJECTIVE / OVERNIGHT EVENTS: patient seen and examined by bedside, no apparent distress noted, no acute events over night        MEDICATIONS  (STANDING):  amLODIPine   Tablet 5 milliGRAM(s) Oral daily  aspirin  chewable 81 milliGRAM(s) Enteral Tube daily  atorvastatin 80 milliGRAM(s) Oral at bedtime  heparin  Injectable 5000 Unit(s) SubCutaneous every 12 hours  hydrocortisone 1% Cream 1 Application(s) Topical two times a day  levETIRAcetam  Solution 1500 milliGRAM(s) Enteral Tube two times a day  multivitamin 1 Tablet(s) Oral daily  sodium chloride 0.9% lock flush 3 milliLiter(s) IV Push every 8 hours    MEDICATIONS  (PRN):  acetaminophen  Suppository 650 milliGRAM(s) Rectal every 6 hours PRN For Temp greater than 38 C (100.4 F)  sodium chloride 0.65% Nasal 1 Spray(s) Both Nostrils four times a day PRN Congestion      Vital Signs Last 24 Hrs  T(C): 37 (11 Jan 2018 11:28), Max: 37 (11 Jan 2018 11:28)  T(F): 98.6 (11 Jan 2018 11:28), Max: 98.6 (11 Jan 2018 11:28)  HR: 74 (11 Jan 2018 11:28) (70 - 74)  BP: 131/81 (11 Jan 2018 11:28) (129/72 - 136/67)  BP(mean): --  RR: 18 (11 Jan 2018 11:28) (18 - 18)  SpO2: 96% (11 Jan 2018 11:28) (96% - 98%)  CAPILLARY BLOOD GLUCOSE        I&O's Summary    PHYSICAL EXAM  GENERAL: NAD  HEAD:  Atraumatic, Normocephalic  NECK: Supple, No JVD  CHEST/LUNG: Clear to auscultation bilaterally; No wheeze  HEART: Regular rate and rhythm; No murmurs, rubs, or gallops  ABDOMEN: Soft, Nontender, Nondistended; Bowel sounds present, PEG +  EXTREMITIES:  Contracted, No clubbing, cyanosis, or edema  PSYCH: awake, alert  SKIN : erythema in the neck from scratching   LABS:                        11.1   12.90 )-----------( 516      ( 11 Jan 2018 07:25 )             34.6     01-11    136  |  98  |  23  ----------------------------<  113<H>  4.5   |  24  |  0.68    Ca    8.9      11 Jan 2018 07:25  Phos  3.5     01-11  Mg     1.9     01-11    TPro  7.4  /  Alb  2.3<L>  /  TBili  0.4  /  DBili  x   /  AST  469<H>  /  ALT  267<H>  /  AlkPhos  229<H>  01-11    PT/INR - ( 11 Jan 2018 07:25 )   PT: 12.8 SEC;   INR: 1.15                    RADIOLOGY & ADDITIONAL TESTS:    Imaging Personally Reviewed:    Consultant(s) Notes Reviewed:  GI, Neuro    Care Discussed with Consultants/Other Providers: Neuro

## 2018-01-11 NOTE — PROGRESS NOTE ADULT - ASSESSMENT
77 year old male currently admitted for status epilepticus which resolved and no clinical seizures noted.   On neuro exam today, patient oriented only to self and can follow midline commands. Mild left facial droop. Increased tone in bilateral lower extremities and able to wiggle RLE toes. LUE moving more with action tremor. R hand with nonrhythmic movements which can be suppressed by patient voluntarily.    Recommend:  No medical treatment recommended for right hand movements  Continue Keppra 1500 BID  Continue vimpat 200 BID

## 2018-01-11 NOTE — PROGRESS NOTE ADULT - ASSESSMENT
Impression:  1. Abnormal liver tests continue to worsen- redominantly hepatocellular pattern of liver injury. DDX include DILI, sepsis > AIH. Fosphenytoin, keppra and lacosemide have all been associated with liver injury. Workup for other cause of abnormal liver tests is in progress  2. Seizures d/o on AEDs  3. Leukocytosis needs workup to rule out     Plan:  1. Recommend checking ASMA, AMA, Immmunoglobulins,  Anti LKM, Anti SLA  2. Trend liver enzymes, coags  3. Hold statin  4. Avoid hepatoxins - if it is safe from neurologic perspective, would hold all AEDs until liver enzymes improve/normalize Impression:  1. Abnormal liver tests continue to worsen- redominantly hepatocellular pattern of liver injury. DDX include DILI, sepsis > AIH. Fosphenytoin, keppra and lacosemide have all been associated with liver injury. Workup for other cause of abnormal liver tests is in progress  2. Seizures d/o on AEDs  3. Leukocytosis needs workup to rule out     Plan:  1. Recommend checking repeat abd US to evaluate for biliary sludge/obstructive process  2. Continue to trend liver enzymes, coags  3. Hold statin  4. Avoid hepatoxins - if it is safe from neurologic perspective, would hold all AEDs until liver enzymes improve/normalize Impression:  1. Abnormal liver tests continue to worsen- redominantly hepatocellular pattern of liver injury. DDX include DILI, sepsis > AIH. Fosphenytoin, keppra and lacosemide have all been associated with liver injury. Workup for other cause of abnormal liver tests is in progress  2. Seizures d/o on AEDs  3. Leukocytosis needs workup to rule out infection    Plan:  1. Recommend checking repeat abd US to evaluate for biliary sludge/obstructive process  2. Continue to trend liver enzymes, coags  3. Hold statin  4. Avoid hepatoxins - if it is safe from neurologic perspective, would hold all AEDs until liver enzymes improve/normalize

## 2018-01-11 NOTE — PROGRESS NOTE ADULT - SUBJECTIVE AND OBJECTIVE BOX
Subjective: Interval History - No events overnight    Objective:   Vital Signs Last 24 Hrs  T(C): 36.9 (11 Jan 2018 05:49), Max: 36.9 (11 Jan 2018 05:49)  T(F): 98.4 (11 Jan 2018 05:49), Max: 98.4 (11 Jan 2018 05:49)  HR: 70 (11 Jan 2018 05:49) (66 - 70)  BP: 129/72 (11 Jan 2018 05:49) (127/69 - 136/67)  RR: 18 (11 Jan 2018 05:49) (18 - 18)  SpO2: 98% (11 Jan 2018 05:49) (97% - 98%)    General Exam:   General appearance: No acute distress                   Neurological Exam:  Mental Status: Orientated to self. Does not know month or place.  Attention intact.  No dysarthria, aphasia or neglect.  Follows midline commands.    Cranial Nerves: PERRL, EOMI, no nystagmus.  Mild left facial droop. Hearing intact.  Tongue midline.    Motor:   Tone: Increased tone in all extremities  Strength: Moving RUE spontaneously. LUE antigravity. Able to wiggle toes in RLE however B/L lower extremities contracted                Tremor: LUE action tremor. Nonrhythmic movement of R hand which can be stopped by patient.    Other:    01-11    136  |  98  |  23  ----------------------------<  113<H>  4.5   |  24  |  0.68    Ca    8.9      11 Jan 2018 07:25  Phos  3.5     01-11  Mg     1.9     01-11    TPro  7.4  /  Alb  2.3<L>  /  TBili  0.4  /  DBili  x   /  AST  469<H>  /  ALT  267<H>  /  AlkPhos  229<H>  01-11    LIVER FUNCTIONS - ( 11 Jan 2018 07:25 )  Alb: 2.3 g/dL / Pro: 7.4 g/dL / ALK PHOS: 229 u/L / ALT: 267 u/L / AST: 469 u/L / GGT: x                                 11.1   12.90 )-----------( 516      ( 11 Jan 2018 07:25 )             34.6       MEDICATIONS  (STANDING):  amLODIPine   Tablet 5 milliGRAM(s) Oral daily  aspirin  chewable 81 milliGRAM(s) Enteral Tube daily  atorvastatin 80 milliGRAM(s) Oral at bedtime  BACItracin   Ointment 1 Application(s) Topical daily  heparin  Injectable 5000 Unit(s) SubCutaneous every 12 hours  hydrocortisone 1% Cream 1 Application(s) Topical two times a day  levETIRAcetam  Solution 1500 milliGRAM(s) Enteral Tube two times a day  multivitamin 1 Tablet(s) Oral daily  sodium chloride 0.9% lock flush 3 milliLiter(s) IV Push every 8 hours    MEDICATIONS  (PRN):  acetaminophen  Suppository 650 milliGRAM(s) Rectal every 6 hours PRN For Temp greater than 38 C (100.4 F)  sodium chloride 0.65% Nasal 1 Spray(s) Both Nostrils four times a day PRN Congestion

## 2018-01-12 LAB
ALBUMIN SERPL ELPH-MCNC: 2.5 G/DL — LOW (ref 3.3–5)
ALP SERPL-CCNC: 233 U/L — HIGH (ref 40–120)
ALT FLD-CCNC: 292 U/L — HIGH (ref 4–41)
AST SERPL-CCNC: 381 U/L — HIGH (ref 4–40)
BASOPHILS # BLD AUTO: 0.16 K/UL — SIGNIFICANT CHANGE UP (ref 0–0.2)
BASOPHILS NFR BLD AUTO: 1.1 % — SIGNIFICANT CHANGE UP (ref 0–2)
BILIRUB SERPL-MCNC: 0.3 MG/DL — SIGNIFICANT CHANGE UP (ref 0.2–1.2)
BUN SERPL-MCNC: 28 MG/DL — HIGH (ref 7–23)
CALCIUM SERPL-MCNC: 8.8 MG/DL — SIGNIFICANT CHANGE UP (ref 8.4–10.5)
CHLORIDE SERPL-SCNC: 96 MMOL/L — LOW (ref 98–107)
CO2 SERPL-SCNC: 24 MMOL/L — SIGNIFICANT CHANGE UP (ref 22–31)
CREAT SERPL-MCNC: 0.77 MG/DL — SIGNIFICANT CHANGE UP (ref 0.5–1.3)
EOSINOPHIL # BLD AUTO: 0.75 K/UL — HIGH (ref 0–0.5)
EOSINOPHIL NFR BLD AUTO: 5.1 % — SIGNIFICANT CHANGE UP (ref 0–6)
GLUCOSE SERPL-MCNC: 141 MG/DL — HIGH (ref 70–99)
HCT VFR BLD CALC: 34.6 % — LOW (ref 39–50)
HGB BLD-MCNC: 11.6 G/DL — LOW (ref 13–17)
IMM GRANULOCYTES # BLD AUTO: 0.53 # — SIGNIFICANT CHANGE UP
IMM GRANULOCYTES NFR BLD AUTO: 3.6 % — HIGH (ref 0–1.5)
INR BLD: 1.09 — SIGNIFICANT CHANGE UP (ref 0.88–1.17)
LKM AB SER-ACNC: 11.5 UNITS — SIGNIFICANT CHANGE UP (ref 0–20)
LYMPHOCYTES # BLD AUTO: 1.58 K/UL — SIGNIFICANT CHANGE UP (ref 1–3.3)
LYMPHOCYTES # BLD AUTO: 10.8 % — LOW (ref 13–44)
MAGNESIUM SERPL-MCNC: 2 MG/DL — SIGNIFICANT CHANGE UP (ref 1.6–2.6)
MCHC RBC-ENTMCNC: 30.1 PG — SIGNIFICANT CHANGE UP (ref 27–34)
MCHC RBC-ENTMCNC: 33.5 % — SIGNIFICANT CHANGE UP (ref 32–36)
MCV RBC AUTO: 89.9 FL — SIGNIFICANT CHANGE UP (ref 80–100)
MONOCYTES # BLD AUTO: 0.96 K/UL — HIGH (ref 0–0.9)
MONOCYTES NFR BLD AUTO: 6.6 % — SIGNIFICANT CHANGE UP (ref 2–14)
NEUTROPHILS # BLD AUTO: 10.62 K/UL — HIGH (ref 1.8–7.4)
NEUTROPHILS NFR BLD AUTO: 72.8 % — SIGNIFICANT CHANGE UP (ref 43–77)
NRBC # FLD: 0 — SIGNIFICANT CHANGE UP
PHENYTOIN FREE SERPL-MCNC: < 0.5 MG/L — LOW (ref 1–2)
PHOSPHATE SERPL-MCNC: 3.6 MG/DL — SIGNIFICANT CHANGE UP (ref 2.5–4.5)
PLATELET # BLD AUTO: 555 K/UL — HIGH (ref 150–400)
PMV BLD: 9.2 FL — SIGNIFICANT CHANGE UP (ref 7–13)
POTASSIUM SERPL-MCNC: 4.2 MMOL/L — SIGNIFICANT CHANGE UP (ref 3.5–5.3)
POTASSIUM SERPL-SCNC: 4.2 MMOL/L — SIGNIFICANT CHANGE UP (ref 3.5–5.3)
PROT SERPL-MCNC: 7.7 G/DL — SIGNIFICANT CHANGE UP (ref 6–8.3)
PROTHROM AB SERPL-ACNC: 12.6 SEC — SIGNIFICANT CHANGE UP (ref 9.8–13.1)
RBC # BLD: 3.85 M/UL — LOW (ref 4.2–5.8)
RBC # FLD: 14.4 % — SIGNIFICANT CHANGE UP (ref 10.3–14.5)
SODIUM SERPL-SCNC: 134 MMOL/L — LOW (ref 135–145)
WBC # BLD: 14.6 K/UL — HIGH (ref 3.8–10.5)
WBC # FLD AUTO: 14.6 K/UL — HIGH (ref 3.8–10.5)

## 2018-01-12 PROCEDURE — 99232 SBSQ HOSP IP/OBS MODERATE 35: CPT | Mod: GC

## 2018-01-12 PROCEDURE — 93975 VASCULAR STUDY: CPT | Mod: 26

## 2018-01-12 PROCEDURE — 99233 SBSQ HOSP IP/OBS HIGH 50: CPT

## 2018-01-12 RX ADMIN — Medication 81 MILLIGRAM(S): at 11:51

## 2018-01-12 RX ADMIN — SODIUM CHLORIDE 3 MILLILITER(S): 9 INJECTION INTRAMUSCULAR; INTRAVENOUS; SUBCUTANEOUS at 21:39

## 2018-01-12 RX ADMIN — HEPARIN SODIUM 5000 UNIT(S): 5000 INJECTION INTRAVENOUS; SUBCUTANEOUS at 17:56

## 2018-01-12 RX ADMIN — LEVETIRACETAM 1500 MILLIGRAM(S): 250 TABLET, FILM COATED ORAL at 17:56

## 2018-01-12 RX ADMIN — Medication 1 APPLICATION(S): at 05:04

## 2018-01-12 RX ADMIN — Medication 1 TABLET(S): at 11:51

## 2018-01-12 RX ADMIN — AMLODIPINE BESYLATE 5 MILLIGRAM(S): 2.5 TABLET ORAL at 07:43

## 2018-01-12 RX ADMIN — HEPARIN SODIUM 5000 UNIT(S): 5000 INJECTION INTRAVENOUS; SUBCUTANEOUS at 05:01

## 2018-01-12 RX ADMIN — SODIUM CHLORIDE 3 MILLILITER(S): 9 INJECTION INTRAMUSCULAR; INTRAVENOUS; SUBCUTANEOUS at 05:06

## 2018-01-12 RX ADMIN — SODIUM CHLORIDE 3 MILLILITER(S): 9 INJECTION INTRAMUSCULAR; INTRAVENOUS; SUBCUTANEOUS at 13:04

## 2018-01-12 RX ADMIN — Medication 1 APPLICATION(S): at 17:57

## 2018-01-12 RX ADMIN — LEVETIRACETAM 1500 MILLIGRAM(S): 250 TABLET, FILM COATED ORAL at 05:02

## 2018-01-12 NOTE — PROGRESS NOTE ADULT - ASSESSMENT
Impression:  1. Abnormal liver tests continue to worsen- redominantly hepatocellular pattern of liver injury. DDX include DILI, sepsis > AIH. Fosphenytoin, keppra and lacosemide have all been associated with liver injury. Workup for other cause of abnormal liver tests is in progress  2. Seizures d/o on AEDs  3. Leukocytosis needs workup to rule out infection    Plan:  1. If liver enzymes do not improve by early next week, patient may require liver biopsy  2. Continue to trend liver enzymes, PT/INR daily  3. Continue holding statin  4. Avoid hepatotoxins

## 2018-01-12 NOTE — PROGRESS NOTE ADULT - PROBLEM SELECTOR PLAN 3
no recurrence of seizures.   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 No epileptic pattern or seizure seen.  MRI brain (12/26) abnormal signal involves the right cingulate gyrus-medial frontal parietal cortex as described. Some considerations include evolving   subacute ischemia, postictal cortical edema, or encephalitis.  neurology f/u noted, dilantin Dced as concern for hepatoxicity ,c/w keppra and Vimpat   GI recommend to hold all AED  if OK with neurology, case discussed with neuro, recommend c/w with Keppra and Vimpat at this time   c/w ASA 81 mg for possible subacute CVA, will DC Lipitor for now secondary to abnormal lfts   SZ precaution

## 2018-01-12 NOTE — PROGRESS NOTE ADULT - SUBJECTIVE AND OBJECTIVE BOX
HEPATOLOGY    Chief Complaint:  Patient is a 77y old  Male who presents with a chief complaint of Seizure x 1 day (16 Dec 2017 01:26)      Interval Events:   - He denies abd pain  - No nausea or vomiting  - He is tolerating tube feeds  - No complaints currently    Allergies:  No Known Allergies      Hospital Medications:  MEDICATIONS  (STANDING):  amLODIPine   Tablet 5 milliGRAM(s) Oral daily  aspirin  chewable 81 milliGRAM(s) Enteral Tube daily  heparin  Injectable 5000 Unit(s) SubCutaneous every 12 hours  hydrocortisone 1% Cream 1 Application(s) Topical two times a day  levETIRAcetam  Solution 1500 milliGRAM(s) Enteral Tube two times a day  multivitamin 1 Tablet(s) Oral daily  sodium chloride 0.9% lock flush 3 milliLiter(s) IV Push every 8 hours    MEDICATIONS  (PRN):  acetaminophen  Suppository 650 milliGRAM(s) Rectal every 6 hours PRN For Temp greater than 38 C (100.4 F)  sodium chloride 0.65% Nasal 1 Spray(s) Both Nostrils four times a day PRN Congestion      PMHX/PSHX:  Dementia without behavioral disturbance, unspecified dementia type  No pertinent past medical history  No significant past surgical history      Family history:  No pertinent family history in first degree relatives      ROS:     no complaints, unable to obtain full ROS      PHYSICAL EXAM:     GENERAL:  No distress  HEENT: conjunctivae clear  CHEST:  Full & symmetric excursion, no increased effort  ABDOMEN:  Soft, non-tender, non-distended  EXTREMITIES:  no edema  SKIN:  Dry/warm  NEURO:  Alert    Vital Signs:    Vital Signs Last 24 Hrs  T(C): 36.6 (12 Jan 2018 13:50), Max: 37.2 (12 Jan 2018 04:59)  T(F): 97.8 (12 Jan 2018 13:50), Max: 99 (12 Jan 2018 04:59)  HR: 75 (12 Jan 2018 13:50) (75 - 87)  BP: 129/75 (12 Jan 2018 13:50) (109/64 - 129/75)  RR: 16 (12 Jan 2018 13:50) (16 - 20)  SpO2: 98% (12 Jan 2018 13:50) (94% - 98%)    LABS:                                                         11.6   14.60 )-----------( 555      ( 12 Jan 2018 06:08 )             34.6       01-12    134<L>  |  96<L>  |  28<H>  ----------------------------<  141<H>  4.2   |  24  |  0.77    Ca    8.8      12 Jan 2018 06:08  Phos  3.6     01-12  Mg     2.0     01-12    TPro  7.7  /  Alb  2.5<L>  /  TBili  0.3  /  DBili  x   /  AST  381<H>  /  ALT  292<H>  /  AlkPhos  233<H>  01-12      Imaging:  < from: US Abdomen Doppler (01.12.18 @ 11:37) >  Liver: Within normal limits. Patent main, left, and right portal veins   with normal spectral tracing.  Bile ducts: Normal caliber.   Gallbladder: Within normal limits.     < end of copied text >

## 2018-01-12 NOTE — PROGRESS NOTE ADULT - ATTENDING COMMENTS
Patient was seen and examined with GI fellow during rounds. Agree with above.  worsening ALT, and ALP but AST slightly downtrending with normal TB, of unclear etiology, likely DILI from antiepileptics   Worsening leukocytosis needs workup for occult infection.  Will recommend  -trend liver tests,   -avoid hepatotoxins  -workup for leukocytosis

## 2018-01-12 NOTE — PROGRESS NOTE ADULT - SUBJECTIVE AND OBJECTIVE BOX
Patient is a 77y old  Male who presents with a chief complaint of Seizure x 1 day (16 Dec 2017 01:26)      SUBJECTIVE / OVERNIGHT EVENTS:  Pt has no complaints. poor historian     MEDICATIONS  (STANDING):  amLODIPine   Tablet 5 milliGRAM(s) Oral daily  aspirin  chewable 81 milliGRAM(s) Enteral Tube daily  heparin  Injectable 5000 Unit(s) SubCutaneous every 12 hours  hydrocortisone 1% Cream 1 Application(s) Topical two times a day  levETIRAcetam  Solution 1500 milliGRAM(s) Enteral Tube two times a day  multivitamin 1 Tablet(s) Oral daily  sodium chloride 0.9% lock flush 3 milliLiter(s) IV Push every 8 hours    MEDICATIONS  (PRN):  acetaminophen  Suppository 650 milliGRAM(s) Rectal every 6 hours PRN For Temp greater than 38 C (100.4 F)  sodium chloride 0.65% Nasal 1 Spray(s) Both Nostrils four times a day PRN Congestion      T(C): 36.6 (01-12-18 @ 13:50), Max: 37.2 (01-12-18 @ 04:59)  HR: 75 (01-12-18 @ 13:50) (75 - 87)  BP: 129/75 (01-12-18 @ 13:50) (109/64 - 129/75)  RR: 16 (01-12-18 @ 13:50) (16 - 20)  SpO2: 98% (01-12-18 @ 13:50) (94% - 98%)  CAPILLARY BLOOD GLUCOSE        I&O's Summary      PHYSICAL EXAM:  GENERAL: NAD, well-developed  HEAD:  Atraumatic, Normocephalic  EYES: EOMI, PERRLA, conjunctiva and sclera clear  NECK: Supple, No JVD  CHEST/LUNG: Clear to auscultation bilaterally; No wheeze  HEART: s1 s2, regular rhythm and rate   ABDOMEN: Soft, Nontender, Nondistended; Bowel sounds present  EXTREMITIES:  2+ Peripheral Pulses, No clubbing, cyanosis, or edema  PSYCH: AAOx3, calm   NEUROLOGY: non-focal  SKIN: No rashes or lesions    LABS:                        11.6   14.60 )-----------( 555      ( 12 Jan 2018 06:08 )             34.6     01-12    134<L>  |  96<L>  |  28<H>  ----------------------------<  141<H>  4.2   |  24  |  0.77    Ca    8.8      12 Jan 2018 06:08  Phos  3.6     01-12  Mg     2.0     01-12    TPro  7.7  /  Alb  2.5<L>  /  TBili  0.3  /  DBili  x   /  AST  381<H>  /  ALT  292<H>  /  AlkPhos  233<H>  01-12    PT/INR - ( 12 Jan 2018 06:08 )   PT: 12.6 SEC;   INR: 1.09                    RADIOLOGY & ADDITIONAL TESTS:    Imaging Personally Reviewed:    Consultant(s) Notes Reviewed:      Care Discussed with Consultants/Other Providers: Patient is a 77y old  Male who presents with a chief complaint of Seizure x 1 day (16 Dec 2017 01:26)      SUBJECTIVE / OVERNIGHT EVENTS:  Pt has no complaints. poor historian     MEDICATIONS  (STANDING):  amLODIPine   Tablet 5 milliGRAM(s) Oral daily  aspirin  chewable 81 milliGRAM(s) Enteral Tube daily  heparin  Injectable 5000 Unit(s) SubCutaneous every 12 hours  hydrocortisone 1% Cream 1 Application(s) Topical two times a day  levETIRAcetam  Solution 1500 milliGRAM(s) Enteral Tube two times a day  multivitamin 1 Tablet(s) Oral daily  sodium chloride 0.9% lock flush 3 milliLiter(s) IV Push every 8 hours    MEDICATIONS  (PRN):  acetaminophen  Suppository 650 milliGRAM(s) Rectal every 6 hours PRN For Temp greater than 38 C (100.4 F)  sodium chloride 0.65% Nasal 1 Spray(s) Both Nostrils four times a day PRN Congestion      T(C): 36.6 (01-12-18 @ 13:50), Max: 37.2 (01-12-18 @ 04:59)  HR: 75 (01-12-18 @ 13:50) (75 - 87)  BP: 129/75 (01-12-18 @ 13:50) (109/64 - 129/75)  RR: 16 (01-12-18 @ 13:50) (16 - 20)  SpO2: 98% (01-12-18 @ 13:50) (94% - 98%)  CAPILLARY BLOOD GLUCOSE        I&O's Summary      PHYSICAL EXAM  GENERAL: NAD, frail   HEAD:  Atraumatic, Normocephalic  NECK: Supple, No JVD  CHEST/LUNG: Clear to auscultation bilaterally; No wheeze  HEART: Regular rate and rhythm; No murmurs, rubs, or gallops  ABDOMEN: Soft, Nontender, Nondistended; Bowel sounds present, PEG +  EXTREMITIES:  Contracted, No clubbing, cyanosis, or edema  PSYCH: awake, alert  SKIN : erythema in the neck from scratching     LABS:                        11.6   14.60 )-----------( 555      ( 12 Jan 2018 06:08 )             34.6     01-12    134<L>  |  96<L>  |  28<H>  ----------------------------<  141<H>  4.2   |  24  |  0.77    Ca    8.8      12 Jan 2018 06:08  Phos  3.6     01-12  Mg     2.0     01-12    TPro  7.7  /  Alb  2.5<L>  /  TBili  0.3  /  DBili  x   /  AST  381<H>  /  ALT  292<H>  /  AlkPhos  233<H>  01-12    PT/INR - ( 12 Jan 2018 06:08 )   PT: 12.6 SEC;   INR: 1.09                    RADIOLOGY & ADDITIONAL TESTS:    Imaging Personally Reviewed:    Consultant(s) Notes Reviewed:      Care Discussed with Consultants/Other Providers:

## 2018-01-12 NOTE — CHART NOTE - NSCHARTNOTEFT_GEN_A_CORE
Pt. currently has no droplet precautions, has been afebrile. Pt. medically stable for D/C to rehab.    ADS  [id number]

## 2018-01-13 LAB
ALBUMIN SERPL ELPH-MCNC: 2.4 G/DL — LOW (ref 3.3–5)
ALP SERPL-CCNC: 212 U/L — HIGH (ref 40–120)
ALT FLD-CCNC: 231 U/L — HIGH (ref 4–41)
AST SERPL-CCNC: 227 U/L — HIGH (ref 4–40)
BASOPHILS # BLD AUTO: 0.12 K/UL — SIGNIFICANT CHANGE UP (ref 0–0.2)
BASOPHILS NFR BLD AUTO: 0.9 % — SIGNIFICANT CHANGE UP (ref 0–2)
BILIRUB SERPL-MCNC: 0.3 MG/DL — SIGNIFICANT CHANGE UP (ref 0.2–1.2)
BUN SERPL-MCNC: 31 MG/DL — HIGH (ref 7–23)
CALCIUM SERPL-MCNC: 8.9 MG/DL — SIGNIFICANT CHANGE UP (ref 8.4–10.5)
CHLORIDE SERPL-SCNC: 100 MMOL/L — SIGNIFICANT CHANGE UP (ref 98–107)
CO2 SERPL-SCNC: 25 MMOL/L — SIGNIFICANT CHANGE UP (ref 22–31)
CREAT SERPL-MCNC: 0.74 MG/DL — SIGNIFICANT CHANGE UP (ref 0.5–1.3)
EOSINOPHIL # BLD AUTO: 0.46 K/UL — SIGNIFICANT CHANGE UP (ref 0–0.5)
EOSINOPHIL NFR BLD AUTO: 3.4 % — SIGNIFICANT CHANGE UP (ref 0–6)
GLUCOSE SERPL-MCNC: 153 MG/DL — HIGH (ref 70–99)
HCT VFR BLD CALC: 35.1 % — LOW (ref 39–50)
HGB BLD-MCNC: 11.8 G/DL — LOW (ref 13–17)
IMM GRANULOCYTES # BLD AUTO: 0.29 # — SIGNIFICANT CHANGE UP
IMM GRANULOCYTES NFR BLD AUTO: 2.2 % — HIGH (ref 0–1.5)
INR BLD: 1.15 — SIGNIFICANT CHANGE UP (ref 0.88–1.17)
LYMPHOCYTES # BLD AUTO: 1.36 K/UL — SIGNIFICANT CHANGE UP (ref 1–3.3)
LYMPHOCYTES # BLD AUTO: 10.1 % — LOW (ref 13–44)
MAGNESIUM SERPL-MCNC: 2.1 MG/DL — SIGNIFICANT CHANGE UP (ref 1.6–2.6)
MCHC RBC-ENTMCNC: 30.6 PG — SIGNIFICANT CHANGE UP (ref 27–34)
MCHC RBC-ENTMCNC: 33.6 % — SIGNIFICANT CHANGE UP (ref 32–36)
MCV RBC AUTO: 91.2 FL — SIGNIFICANT CHANGE UP (ref 80–100)
MONOCYTES # BLD AUTO: 1.07 K/UL — HIGH (ref 0–0.9)
MONOCYTES NFR BLD AUTO: 8 % — SIGNIFICANT CHANGE UP (ref 2–14)
NEUTROPHILS # BLD AUTO: 10.14 K/UL — HIGH (ref 1.8–7.4)
NEUTROPHILS NFR BLD AUTO: 75.4 % — SIGNIFICANT CHANGE UP (ref 43–77)
NRBC # FLD: 0 — SIGNIFICANT CHANGE UP
PHOSPHATE SERPL-MCNC: 3.3 MG/DL — SIGNIFICANT CHANGE UP (ref 2.5–4.5)
PLATELET # BLD AUTO: 508 K/UL — HIGH (ref 150–400)
PMV BLD: 9.7 FL — SIGNIFICANT CHANGE UP (ref 7–13)
POTASSIUM SERPL-MCNC: 4.2 MMOL/L — SIGNIFICANT CHANGE UP (ref 3.5–5.3)
POTASSIUM SERPL-SCNC: 4.2 MMOL/L — SIGNIFICANT CHANGE UP (ref 3.5–5.3)
PROT SERPL-MCNC: 7.7 G/DL — SIGNIFICANT CHANGE UP (ref 6–8.3)
PROTHROM AB SERPL-ACNC: 12.8 SEC — SIGNIFICANT CHANGE UP (ref 9.8–13.1)
RBC # BLD: 3.85 M/UL — LOW (ref 4.2–5.8)
RBC # FLD: 14.6 % — HIGH (ref 10.3–14.5)
SODIUM SERPL-SCNC: 138 MMOL/L — SIGNIFICANT CHANGE UP (ref 135–145)
WBC # BLD: 13.44 K/UL — HIGH (ref 3.8–10.5)
WBC # FLD AUTO: 13.44 K/UL — HIGH (ref 3.8–10.5)

## 2018-01-13 PROCEDURE — 99232 SBSQ HOSP IP/OBS MODERATE 35: CPT

## 2018-01-13 RX ADMIN — LEVETIRACETAM 1500 MILLIGRAM(S): 250 TABLET, FILM COATED ORAL at 18:20

## 2018-01-13 RX ADMIN — Medication 81 MILLIGRAM(S): at 13:31

## 2018-01-13 RX ADMIN — Medication 1 APPLICATION(S): at 18:21

## 2018-01-13 RX ADMIN — SODIUM CHLORIDE 3 MILLILITER(S): 9 INJECTION INTRAMUSCULAR; INTRAVENOUS; SUBCUTANEOUS at 21:51

## 2018-01-13 RX ADMIN — Medication 1 TABLET(S): at 13:31

## 2018-01-13 RX ADMIN — AMLODIPINE BESYLATE 5 MILLIGRAM(S): 2.5 TABLET ORAL at 05:28

## 2018-01-13 RX ADMIN — SODIUM CHLORIDE 3 MILLILITER(S): 9 INJECTION INTRAMUSCULAR; INTRAVENOUS; SUBCUTANEOUS at 14:01

## 2018-01-13 RX ADMIN — HEPARIN SODIUM 5000 UNIT(S): 5000 INJECTION INTRAVENOUS; SUBCUTANEOUS at 18:20

## 2018-01-13 RX ADMIN — Medication 1 APPLICATION(S): at 05:29

## 2018-01-13 RX ADMIN — HEPARIN SODIUM 5000 UNIT(S): 5000 INJECTION INTRAVENOUS; SUBCUTANEOUS at 05:28

## 2018-01-13 RX ADMIN — SODIUM CHLORIDE 3 MILLILITER(S): 9 INJECTION INTRAMUSCULAR; INTRAVENOUS; SUBCUTANEOUS at 05:29

## 2018-01-13 RX ADMIN — LEVETIRACETAM 1500 MILLIGRAM(S): 250 TABLET, FILM COATED ORAL at 05:28

## 2018-01-13 NOTE — PROGRESS NOTE ADULT - SUBJECTIVE AND OBJECTIVE BOX
Patient is a 77y old  Male who presents with a chief complaint of Seizure x 1 day (16 Dec 2017 01:26)      SUBJECTIVE / OVERNIGHT EVENTS:  Pt is a poor historian. No event    MEDICATIONS  (STANDING):  amLODIPine   Tablet 5 milliGRAM(s) Oral daily  aspirin  chewable 81 milliGRAM(s) Enteral Tube daily  heparin  Injectable 5000 Unit(s) SubCutaneous every 12 hours  hydrocortisone 1% Cream 1 Application(s) Topical two times a day  levETIRAcetam  Solution 1500 milliGRAM(s) Enteral Tube two times a day  multivitamin 1 Tablet(s) Oral daily  sodium chloride 0.9% lock flush 3 milliLiter(s) IV Push every 8 hours    MEDICATIONS  (PRN):  acetaminophen  Suppository 650 milliGRAM(s) Rectal every 6 hours PRN For Temp greater than 38 C (100.4 F)  sodium chloride 0.65% Nasal 1 Spray(s) Both Nostrils four times a day PRN Congestion      T(C): 37.1 (01-13-18 @ 13:58), Max: 37.1 (01-13-18 @ 13:58)  HR: 74 (01-13-18 @ 13:58) (74 - 82)  BP: 126/72 (01-13-18 @ 13:58) (126/64 - 139/82)  RR: 18 (01-13-18 @ 13:58) (18 - 18)  SpO2: 98% (01-13-18 @ 13:58) (98% - 99%)  CAPILLARY BLOOD GLUCOSE        I&O's Summary      PHYSICAL EXAM:  GENERAL: NAD, well-developed  HEAD:  Atraumatic, Normocephalic  EYES: EOMI, PERRLA, conjunctiva and sclera clear  NECK: Supple, No JVD  CHEST/LUNG: Clear to auscultation bilaterally; No wheeze  HEART: s1 s2, regular rhythm and rate   ABDOMEN: Soft, Nontender, Nondistended; Bowel sounds present  EXTREMITIES:  2+ Peripheral Pulses, No clubbing, cyanosis, or edema  PSYCH: AAOx3, calm   NEUROLOGY: non-focal  SKIN: No rashes or lesions    LABS:                        11.8   13.44 )-----------( 508      ( 13 Jan 2018 06:24 )             35.1     01-13    138  |  100  |  31<H>  ----------------------------<  153<H>  4.2   |  25  |  0.74    Ca    8.9      13 Jan 2018 06:24  Phos  3.3     01-13  Mg     2.1     01-13    TPro  7.7  /  Alb  2.4<L>  /  TBili  0.3  /  DBili  x   /  AST  227<H>  /  ALT  231<H>  /  AlkPhos  212<H>  01-13    PT/INR - ( 13 Jan 2018 06:24 )   PT: 12.8 SEC;   INR: 1.15                    RADIOLOGY & ADDITIONAL TESTS:    Imaging Personally Reviewed:    Consultant(s) Notes Reviewed:      Care Discussed with Consultants/Other Providers: Patient is a 77y old  Male who presents with a chief complaint of Seizure x 1 day (16 Dec 2017 01:26)      SUBJECTIVE / OVERNIGHT EVENTS:  Pt is a poor historian. No event    MEDICATIONS  (STANDING):  amLODIPine   Tablet 5 milliGRAM(s) Oral daily  aspirin  chewable 81 milliGRAM(s) Enteral Tube daily  heparin  Injectable 5000 Unit(s) SubCutaneous every 12 hours  hydrocortisone 1% Cream 1 Application(s) Topical two times a day  levETIRAcetam  Solution 1500 milliGRAM(s) Enteral Tube two times a day  multivitamin 1 Tablet(s) Oral daily  sodium chloride 0.9% lock flush 3 milliLiter(s) IV Push every 8 hours    MEDICATIONS  (PRN):  acetaminophen  Suppository 650 milliGRAM(s) Rectal every 6 hours PRN For Temp greater than 38 C (100.4 F)  sodium chloride 0.65% Nasal 1 Spray(s) Both Nostrils four times a day PRN Congestion      T(C): 37.1 (01-13-18 @ 13:58), Max: 37.1 (01-13-18 @ 13:58)  HR: 74 (01-13-18 @ 13:58) (74 - 82)  BP: 126/72 (01-13-18 @ 13:58) (126/64 - 139/82)  RR: 18 (01-13-18 @ 13:58) (18 - 18)  SpO2: 98% (01-13-18 @ 13:58) (98% - 99%)  CAPILLARY BLOOD GLUCOSE        I&O's Summary      PHYSICAL EXAM  GENERAL: NAD, frail   HEAD:  Atraumatic, Normocephalic  NECK: Supple, No JVD  CHEST/LUNG: Clear to auscultation bilaterally; No wheeze  HEART: Regular rate and rhythm; No murmurs, rubs, or gallops  ABDOMEN: Soft, Nontender, Nondistended; Bowel sounds present, PEG +  EXTREMITIES:  Contracted, No clubbing, cyanosis, or edema  PSYCH: awake, alert  SKIN : erythema in the neck from scratching       LABS:                        11.8   13.44 )-----------( 508      ( 13 Jan 2018 06:24 )             35.1     01-13    138  |  100  |  31<H>  ----------------------------<  153<H>  4.2   |  25  |  0.74    Ca    8.9      13 Jan 2018 06:24  Phos  3.3     01-13  Mg     2.1     01-13    TPro  7.7  /  Alb  2.4<L>  /  TBili  0.3  /  DBili  x   /  AST  227<H>  /  ALT  231<H>  /  AlkPhos  212<H>  01-13    PT/INR - ( 13 Jan 2018 06:24 )   PT: 12.8 SEC;   INR: 1.15                    RADIOLOGY & ADDITIONAL TESTS:    Imaging Personally Reviewed:    Consultant(s) Notes Reviewed:      Care Discussed with Consultants/Other Providers:

## 2018-01-13 NOTE — PROGRESS NOTE ADULT - PROBLEM SELECTOR PLAN 1
repeat abd US with doppler (1/12) unremarkable  follow up hepatology  Hepatitis profile negative, may be drug induced,  dilantin / lipitor Dced,   , avoid hepatotoxic drugs  CT a/p unremarkable  GI f/u noted, will f/u AMA, ASMA, Anti LKM and Anti SLA ab

## 2018-01-14 LAB
ALBUMIN SERPL ELPH-MCNC: 2.4 G/DL — LOW (ref 3.3–5)
ALP SERPL-CCNC: 198 U/L — HIGH (ref 40–120)
ALT FLD-CCNC: 185 U/L — HIGH (ref 4–41)
AST SERPL-CCNC: 152 U/L — HIGH (ref 4–40)
BASOPHILS # BLD AUTO: 0.11 K/UL — SIGNIFICANT CHANGE UP (ref 0–0.2)
BASOPHILS NFR BLD AUTO: 0.8 % — SIGNIFICANT CHANGE UP (ref 0–2)
BILIRUB SERPL-MCNC: 0.3 MG/DL — SIGNIFICANT CHANGE UP (ref 0.2–1.2)
BUN SERPL-MCNC: 35 MG/DL — HIGH (ref 7–23)
CALCIUM SERPL-MCNC: 8.9 MG/DL — SIGNIFICANT CHANGE UP (ref 8.4–10.5)
CHLORIDE SERPL-SCNC: 102 MMOL/L — SIGNIFICANT CHANGE UP (ref 98–107)
CO2 SERPL-SCNC: 28 MMOL/L — SIGNIFICANT CHANGE UP (ref 22–31)
CREAT SERPL-MCNC: 0.69 MG/DL — SIGNIFICANT CHANGE UP (ref 0.5–1.3)
EOSINOPHIL # BLD AUTO: 0.52 K/UL — HIGH (ref 0–0.5)
EOSINOPHIL NFR BLD AUTO: 3.9 % — SIGNIFICANT CHANGE UP (ref 0–6)
GLUCOSE SERPL-MCNC: 138 MG/DL — HIGH (ref 70–99)
HCT VFR BLD CALC: 33.4 % — LOW (ref 39–50)
HGB BLD-MCNC: 11 G/DL — LOW (ref 13–17)
IMM GRANULOCYTES # BLD AUTO: 0.35 # — SIGNIFICANT CHANGE UP
IMM GRANULOCYTES NFR BLD AUTO: 2.6 % — HIGH (ref 0–1.5)
INR BLD: 1.1 — SIGNIFICANT CHANGE UP (ref 0.88–1.17)
LYMPHOCYTES # BLD AUTO: 1.49 K/UL — SIGNIFICANT CHANGE UP (ref 1–3.3)
LYMPHOCYTES # BLD AUTO: 11.1 % — LOW (ref 13–44)
MAGNESIUM SERPL-MCNC: 2.2 MG/DL — SIGNIFICANT CHANGE UP (ref 1.6–2.6)
MCHC RBC-ENTMCNC: 30.2 PG — SIGNIFICANT CHANGE UP (ref 27–34)
MCHC RBC-ENTMCNC: 32.9 % — SIGNIFICANT CHANGE UP (ref 32–36)
MCV RBC AUTO: 91.8 FL — SIGNIFICANT CHANGE UP (ref 80–100)
MONOCYTES # BLD AUTO: 1.1 K/UL — HIGH (ref 0–0.9)
MONOCYTES NFR BLD AUTO: 8.2 % — SIGNIFICANT CHANGE UP (ref 2–14)
NEUTROPHILS # BLD AUTO: 9.9 K/UL — HIGH (ref 1.8–7.4)
NEUTROPHILS NFR BLD AUTO: 73.4 % — SIGNIFICANT CHANGE UP (ref 43–77)
NRBC # FLD: 0 — SIGNIFICANT CHANGE UP
PHOSPHATE SERPL-MCNC: 3.2 MG/DL — SIGNIFICANT CHANGE UP (ref 2.5–4.5)
PLATELET # BLD AUTO: 542 K/UL — HIGH (ref 150–400)
PMV BLD: 9.3 FL — SIGNIFICANT CHANGE UP (ref 7–13)
POTASSIUM SERPL-MCNC: 4.3 MMOL/L — SIGNIFICANT CHANGE UP (ref 3.5–5.3)
POTASSIUM SERPL-SCNC: 4.3 MMOL/L — SIGNIFICANT CHANGE UP (ref 3.5–5.3)
PROT SERPL-MCNC: 7.6 G/DL — SIGNIFICANT CHANGE UP (ref 6–8.3)
PROTHROM AB SERPL-ACNC: 12.7 SEC — SIGNIFICANT CHANGE UP (ref 9.8–13.1)
RBC # BLD: 3.64 M/UL — LOW (ref 4.2–5.8)
RBC # FLD: 14.7 % — HIGH (ref 10.3–14.5)
SODIUM SERPL-SCNC: 142 MMOL/L — SIGNIFICANT CHANGE UP (ref 135–145)
WBC # BLD: 13.47 K/UL — HIGH (ref 3.8–10.5)
WBC # FLD AUTO: 13.47 K/UL — HIGH (ref 3.8–10.5)

## 2018-01-14 PROCEDURE — 99232 SBSQ HOSP IP/OBS MODERATE 35: CPT

## 2018-01-14 RX ADMIN — LEVETIRACETAM 1500 MILLIGRAM(S): 250 TABLET, FILM COATED ORAL at 05:37

## 2018-01-14 RX ADMIN — HEPARIN SODIUM 5000 UNIT(S): 5000 INJECTION INTRAVENOUS; SUBCUTANEOUS at 18:36

## 2018-01-14 RX ADMIN — AMLODIPINE BESYLATE 5 MILLIGRAM(S): 2.5 TABLET ORAL at 05:37

## 2018-01-14 RX ADMIN — Medication 81 MILLIGRAM(S): at 12:57

## 2018-01-14 RX ADMIN — HEPARIN SODIUM 5000 UNIT(S): 5000 INJECTION INTRAVENOUS; SUBCUTANEOUS at 05:37

## 2018-01-14 RX ADMIN — SODIUM CHLORIDE 3 MILLILITER(S): 9 INJECTION INTRAMUSCULAR; INTRAVENOUS; SUBCUTANEOUS at 05:42

## 2018-01-14 RX ADMIN — Medication 1 APPLICATION(S): at 18:36

## 2018-01-14 RX ADMIN — SODIUM CHLORIDE 3 MILLILITER(S): 9 INJECTION INTRAMUSCULAR; INTRAVENOUS; SUBCUTANEOUS at 13:00

## 2018-01-14 RX ADMIN — Medication 1 APPLICATION(S): at 05:38

## 2018-01-14 RX ADMIN — SODIUM CHLORIDE 3 MILLILITER(S): 9 INJECTION INTRAMUSCULAR; INTRAVENOUS; SUBCUTANEOUS at 21:50

## 2018-01-14 RX ADMIN — LEVETIRACETAM 1500 MILLIGRAM(S): 250 TABLET, FILM COATED ORAL at 18:36

## 2018-01-14 RX ADMIN — Medication 1 TABLET(S): at 12:57

## 2018-01-14 NOTE — PROGRESS NOTE ADULT - ASSESSMENT
77 yr old  male  with dementia (unclear baseline), p/w episode of obtundation with course c/b seizures x 2 in the ED that initially resolved with Ativan. 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 Hospital course complicated by fever, SIRS, cannot r/o meningitis vs encephalitis.

## 2018-01-14 NOTE — PROGRESS NOTE ADULT - PROBLEM SELECTOR PLAN 4
Pt not septic now. Rectal temp 100.1 in PM, clinically unchanged. observe for fever.   leucocytosis and low grade fever (1/5-1/6), may be secondary to recent PEG, CT-c/a/p no acute pathology  s/p short course of empiric abx  blood cx remains neg

## 2018-01-14 NOTE — PROGRESS NOTE ADULT - SUBJECTIVE AND OBJECTIVE BOX
Patient is a 77y old  Male who presents with a chief complaint of Seizure x 1 day (16 Dec 2017 01:26)      SUBJECTIVE / OVERNIGHT EVENTS:  Pt feels well, no complaints.     MEDICATIONS  (STANDING):  amLODIPine   Tablet 5 milliGRAM(s) Oral daily  aspirin  chewable 81 milliGRAM(s) Enteral Tube daily  heparin  Injectable 5000 Unit(s) SubCutaneous every 12 hours  hydrocortisone 1% Cream 1 Application(s) Topical two times a day  levETIRAcetam  Solution 1500 milliGRAM(s) Enteral Tube two times a day  multivitamin 1 Tablet(s) Oral daily  sodium chloride 0.9% lock flush 3 milliLiter(s) IV Push every 8 hours    MEDICATIONS  (PRN):  acetaminophen  Suppository 650 milliGRAM(s) Rectal every 6 hours PRN For Temp greater than 38 C (100.4 F)  sodium chloride 0.65% Nasal 1 Spray(s) Both Nostrils four times a day PRN Congestion      T(C): 37.8 (01-14-18 @ 15:03), Max: 37.8 (01-14-18 @ 15:03)  HR: 74 (01-14-18 @ 14:19) (70 - 74)  BP: 132/70 (01-14-18 @ 14:19) (123/71 - 132/70)  RR: 18 (01-14-18 @ 14:19) (18 - 20)  SpO2: 98% (01-14-18 @ 14:19) (96% - 99%)  CAPILLARY BLOOD GLUCOSE        I&O's Summary      PHYSICAL EXAM:  GENERAL: NAD, well-developed  HEAD:  Atraumatic, Normocephalic  EYES: EOMI, PERRLA, conjunctiva and sclera clear  NECK: Supple, No JVD  CHEST/LUNG: Clear to auscultation bilaterally; No wheeze  HEART: s1 s2, regular rhythm and rate   ABDOMEN: Soft, Nontender, Nondistended; Bowel sounds present  EXTREMITIES:  2+ Peripheral Pulses, No clubbing, cyanosis, or edema  PSYCH: AAOx3, calm   NEUROLOGY: non-focal  SKIN: No rashes or lesions    LABS:                        11.0   13.47 )-----------( 542      ( 14 Jan 2018 06:54 )             33.4     01-14    142  |  102  |  35<H>  ----------------------------<  138<H>  4.3   |  28  |  0.69    Ca    8.9      14 Jan 2018 06:54  Phos  3.2     01-14  Mg     2.2     01-14    TPro  7.6  /  Alb  2.4<L>  /  TBili  0.3  /  DBili  x   /  AST  152<H>  /  ALT  185<H>  /  AlkPhos  198<H>  01-14    PT/INR - ( 14 Jan 2018 06:54 )   PT: 12.7 SEC;   INR: 1.10                    RADIOLOGY & ADDITIONAL TESTS:    Imaging Personally Reviewed:    Consultant(s) Notes Reviewed:      Care Discussed with Consultants/Other Providers: Patient is a 77y old  Male who presents with a chief complaint of Seizure x 1 day (16 Dec 2017 01:26)      SUBJECTIVE / OVERNIGHT EVENTS:  Pt feels well, no complaints.     MEDICATIONS  (STANDING):  amLODIPine   Tablet 5 milliGRAM(s) Oral daily  aspirin  chewable 81 milliGRAM(s) Enteral Tube daily  heparin  Injectable 5000 Unit(s) SubCutaneous every 12 hours  hydrocortisone 1% Cream 1 Application(s) Topical two times a day  levETIRAcetam  Solution 1500 milliGRAM(s) Enteral Tube two times a day  multivitamin 1 Tablet(s) Oral daily  sodium chloride 0.9% lock flush 3 milliLiter(s) IV Push every 8 hours    MEDICATIONS  (PRN):  acetaminophen  Suppository 650 milliGRAM(s) Rectal every 6 hours PRN For Temp greater than 38 C (100.4 F)  sodium chloride 0.65% Nasal 1 Spray(s) Both Nostrils four times a day PRN Congestion      T(C): 37.8 (01-14-18 @ 15:03), Max: 37.8 (01-14-18 @ 15:03)  HR: 74 (01-14-18 @ 14:19) (70 - 74)  BP: 132/70 (01-14-18 @ 14:19) (123/71 - 132/70)  RR: 18 (01-14-18 @ 14:19) (18 - 20)  SpO2: 98% (01-14-18 @ 14:19) (96% - 99%)  CAPILLARY BLOOD GLUCOSE        I&O's Summary    PHYSICAL EXAM  GENERAL: NAD, frail   HEAD:  Atraumatic, Normocephalic  NECK: Supple, No JVD  CHEST/LUNG: Clear to auscultation bilaterally; No wheeze  HEART: Regular rate and rhythm; No murmurs, rubs, or gallops  ABDOMEN: Soft, Nontender, Nondistended; Bowel sounds present, PEG +  EXTREMITIES:  Contracted, No clubbing, cyanosis, or edema  PSYCH: awake, alert  SKIN : no erythema     LABS:                        11.0   13.47 )-----------( 542      ( 14 Jan 2018 06:54 )             33.4     01-14    142  |  102  |  35<H>  ----------------------------<  138<H>  4.3   |  28  |  0.69    Ca    8.9      14 Jan 2018 06:54  Phos  3.2     01-14  Mg     2.2     01-14    TPro  7.6  /  Alb  2.4<L>  /  TBili  0.3  /  DBili  x   /  AST  152<H>  /  ALT  185<H>  /  AlkPhos  198<H>  01-14    PT/INR - ( 14 Jan 2018 06:54 )   PT: 12.7 SEC;   INR: 1.10                    RADIOLOGY & ADDITIONAL TESTS:    Imaging Personally Reviewed:    Consultant(s) Notes Reviewed:      Care Discussed with Consultants/Other Providers:

## 2018-01-14 NOTE — PROGRESS NOTE ADULT - PROBLEM SELECTOR PLAN 1
slowing improved   repeat abd US with doppler (1/12) unremarkable  follow up hepatology  Hepatitis profile negative, may be drug induced,  dilantin / lipitor Dced,   , avoid hepatotoxic drugs  CT a/p unremarkable  GI f/u noted, will f/u AMA, ASMA, Anti LKM and Anti SLA ab

## 2018-01-15 LAB
ALBUMIN SERPL ELPH-MCNC: 2.5 G/DL — LOW (ref 3.3–5)
ALP SERPL-CCNC: 186 U/L — HIGH (ref 40–120)
ALT FLD-CCNC: 155 U/L — HIGH (ref 4–41)
AST SERPL-CCNC: 116 U/L — HIGH (ref 4–40)
BASOPHILS # BLD AUTO: 0.13 K/UL — SIGNIFICANT CHANGE UP (ref 0–0.2)
BASOPHILS NFR BLD AUTO: 0.9 % — SIGNIFICANT CHANGE UP (ref 0–2)
BILIRUB SERPL-MCNC: 0.3 MG/DL — SIGNIFICANT CHANGE UP (ref 0.2–1.2)
BUN SERPL-MCNC: 36 MG/DL — HIGH (ref 7–23)
CALCIUM SERPL-MCNC: 9.1 MG/DL — SIGNIFICANT CHANGE UP (ref 8.4–10.5)
CHLORIDE SERPL-SCNC: 104 MMOL/L — SIGNIFICANT CHANGE UP (ref 98–107)
CO2 SERPL-SCNC: 27 MMOL/L — SIGNIFICANT CHANGE UP (ref 22–31)
CREAT SERPL-MCNC: 0.7 MG/DL — SIGNIFICANT CHANGE UP (ref 0.5–1.3)
EOSINOPHIL # BLD AUTO: 0.5 K/UL — SIGNIFICANT CHANGE UP (ref 0–0.5)
EOSINOPHIL NFR BLD AUTO: 3.4 % — SIGNIFICANT CHANGE UP (ref 0–6)
GLUCOSE SERPL-MCNC: 131 MG/DL — HIGH (ref 70–99)
HCT VFR BLD CALC: 33.8 % — LOW (ref 39–50)
HGB BLD-MCNC: 10.9 G/DL — LOW (ref 13–17)
IMM GRANULOCYTES # BLD AUTO: 0.32 # — SIGNIFICANT CHANGE UP
IMM GRANULOCYTES NFR BLD AUTO: 2.2 % — HIGH (ref 0–1.5)
INR BLD: 1.14 — SIGNIFICANT CHANGE UP (ref 0.88–1.17)
LYMPHOCYTES # BLD AUTO: 1.68 K/UL — SIGNIFICANT CHANGE UP (ref 1–3.3)
LYMPHOCYTES # BLD AUTO: 11.5 % — LOW (ref 13–44)
MAGNESIUM SERPL-MCNC: 2.2 MG/DL — SIGNIFICANT CHANGE UP (ref 1.6–2.6)
MCHC RBC-ENTMCNC: 29.7 PG — SIGNIFICANT CHANGE UP (ref 27–34)
MCHC RBC-ENTMCNC: 32.2 % — SIGNIFICANT CHANGE UP (ref 32–36)
MCV RBC AUTO: 92.1 FL — SIGNIFICANT CHANGE UP (ref 80–100)
MONOCYTES # BLD AUTO: 1.02 K/UL — HIGH (ref 0–0.9)
MONOCYTES NFR BLD AUTO: 7 % — SIGNIFICANT CHANGE UP (ref 2–14)
NEUTROPHILS # BLD AUTO: 10.95 K/UL — HIGH (ref 1.8–7.4)
NEUTROPHILS NFR BLD AUTO: 75 % — SIGNIFICANT CHANGE UP (ref 43–77)
NRBC # FLD: 0 — SIGNIFICANT CHANGE UP
PHOSPHATE SERPL-MCNC: 3.4 MG/DL — SIGNIFICANT CHANGE UP (ref 2.5–4.5)
PLATELET # BLD AUTO: 628 K/UL — HIGH (ref 150–400)
PMV BLD: 8.9 FL — SIGNIFICANT CHANGE UP (ref 7–13)
POTASSIUM SERPL-MCNC: 4.5 MMOL/L — SIGNIFICANT CHANGE UP (ref 3.5–5.3)
POTASSIUM SERPL-SCNC: 4.5 MMOL/L — SIGNIFICANT CHANGE UP (ref 3.5–5.3)
PROT SERPL-MCNC: 7.7 G/DL — SIGNIFICANT CHANGE UP (ref 6–8.3)
PROTHROM AB SERPL-ACNC: 12.7 SEC — SIGNIFICANT CHANGE UP (ref 9.8–13.1)
RBC # BLD: 3.67 M/UL — LOW (ref 4.2–5.8)
RBC # FLD: 14.8 % — HIGH (ref 10.3–14.5)
REVIEW TO FOLLOW: YES — SIGNIFICANT CHANGE UP
SODIUM SERPL-SCNC: 144 MMOL/L — SIGNIFICANT CHANGE UP (ref 135–145)
WBC # BLD: 14.6 K/UL — HIGH (ref 3.8–10.5)
WBC # FLD AUTO: 14.6 K/UL — HIGH (ref 3.8–10.5)

## 2018-01-15 PROCEDURE — 99233 SBSQ HOSP IP/OBS HIGH 50: CPT

## 2018-01-15 RX ADMIN — LEVETIRACETAM 1500 MILLIGRAM(S): 250 TABLET, FILM COATED ORAL at 18:19

## 2018-01-15 RX ADMIN — Medication 1 APPLICATION(S): at 18:19

## 2018-01-15 RX ADMIN — Medication 1 APPLICATION(S): at 05:51

## 2018-01-15 RX ADMIN — HEPARIN SODIUM 5000 UNIT(S): 5000 INJECTION INTRAVENOUS; SUBCUTANEOUS at 18:19

## 2018-01-15 RX ADMIN — SODIUM CHLORIDE 3 MILLILITER(S): 9 INJECTION INTRAMUSCULAR; INTRAVENOUS; SUBCUTANEOUS at 05:52

## 2018-01-15 RX ADMIN — HEPARIN SODIUM 5000 UNIT(S): 5000 INJECTION INTRAVENOUS; SUBCUTANEOUS at 05:51

## 2018-01-15 RX ADMIN — Medication 1 TABLET(S): at 12:36

## 2018-01-15 RX ADMIN — SODIUM CHLORIDE 3 MILLILITER(S): 9 INJECTION INTRAMUSCULAR; INTRAVENOUS; SUBCUTANEOUS at 21:53

## 2018-01-15 RX ADMIN — Medication 81 MILLIGRAM(S): at 12:36

## 2018-01-15 RX ADMIN — AMLODIPINE BESYLATE 5 MILLIGRAM(S): 2.5 TABLET ORAL at 05:51

## 2018-01-15 RX ADMIN — SODIUM CHLORIDE 3 MILLILITER(S): 9 INJECTION INTRAMUSCULAR; INTRAVENOUS; SUBCUTANEOUS at 13:25

## 2018-01-15 RX ADMIN — LEVETIRACETAM 1500 MILLIGRAM(S): 250 TABLET, FILM COATED ORAL at 05:52

## 2018-01-15 NOTE — PROGRESS NOTE ADULT - PROBLEM SELECTOR PLAN 1
slowing improved   repeat abd US with doppler (1/12) unremarkable  follow up hepatology  Hepatitis profile negative, may be drug induced,  dilantin / lipitor Dced,   , avoid hepatotoxic drugs  CT a/p unremarkable  GI f/u noted, will f/u AMA, ASMA, Anti LKM and Anti SLA ab slowly  improving    repeat abd US with doppler (1/12) unremarkable  follow up hepatology  Hepatitis profile negative, may be drug induced,  dilantin / lipitor Dced,   , avoid hepatotoxic drugs  CT a/p unremarkable  GI f/u noted, will f/u AMA, ASMA, Anti LKM and Anti SLA ab

## 2018-01-15 NOTE — PROGRESS NOTE ADULT - SUBJECTIVE AND OBJECTIVE BOX
Patient is a 77y old  Male who presents with a chief complaint of Seizure x 1 day (16 Dec 2017 01:26)      SUBJECTIVE / OVERNIGHT EVENTS:    MEDICATIONS  (STANDING):  amLODIPine   Tablet 5 milliGRAM(s) Oral daily  aspirin  chewable 81 milliGRAM(s) Enteral Tube daily  heparin  Injectable 5000 Unit(s) SubCutaneous every 12 hours  hydrocortisone 1% Cream 1 Application(s) Topical two times a day  levETIRAcetam  Solution 1500 milliGRAM(s) Enteral Tube two times a day  multivitamin 1 Tablet(s) Oral daily  sodium chloride 0.9% lock flush 3 milliLiter(s) IV Push every 8 hours    MEDICATIONS  (PRN):  acetaminophen  Suppository 650 milliGRAM(s) Rectal every 6 hours PRN For Temp greater than 38 C (100.4 F)  sodium chloride 0.65% Nasal 1 Spray(s) Both Nostrils four times a day PRN Congestion      Vital Signs Last 24 Hrs  T(C): 37.5 (15 Kyle 2018 12:26), Max: 37.5 (15 Kyle 2018 12:26)  T(F): 99.5 (15 Kyle 2018 12:26), Max: 99.5 (15 Kyle 2018 12:26)  HR: 74 (15 Kyle 2018 12:26) (71 - 74)  BP: 131/63 (15 Kyle 2018 12:26) (120/68 - 133/78)  BP(mean): --  RR: 19 (15 Kyle 2018 12:26) (18 - 19)  SpO2: 99% (15 Kyle 2018 12:26) (96% - 100%)  CAPILLARY BLOOD GLUCOSE        I&O's Summary      PHYSICAL EXAM  GENERAL: NAD, frail   HEAD:  Atraumatic, Normocephalic  NECK: Supple, No JVD  CHEST/LUNG: Clear to auscultation bilaterally; No wheeze  HEART: Regular rate and rhythm; No murmurs, rubs, or gallops  ABDOMEN: Soft, Nontender, Nondistended; Bowel sounds present, PEG +  EXTREMITIES:  Contracted, No clubbing, cyanosis, or edema  PSYCH: awake, alert  SKIN : no erythema         LABS:                        10.9   14.60 )-----------( 628      ( 15 Kyle 2018 07:35 )             33.8     01-15    144  |  104  |  36<H>  ----------------------------<  131<H>  4.5   |  27  |  0.70    Ca    9.1      15 Kyle 2018 07:35  Phos  3.4     01-15  Mg     2.2     01-15    TPro  7.7  /  Alb  2.5<L>  /  TBili  0.3  /  DBili  x   /  AST  116<H>  /  ALT  155<H>  /  AlkPhos  186<H>  01-15    PT/INR - ( 15 Kyle 2018 07:35 )   PT: 12.7 SEC;   INR: 1.14                    RADIOLOGY & ADDITIONAL TESTS:    Imaging Personally Reviewed:    Consultant(s) Notes Reviewed:      Care Discussed with Consultants/Other Providers: Patient is a 77y old  Male who presents with a chief complaint of Seizure x 1 day (16 Dec 2017 01:26)      SUBJECTIVE / OVERNIGHT EVENTS: patient seen and examined by bedside, denies headache, dizziness, SOB, CP, Palpitations, N/V/D, abdominal pain        MEDICATIONS  (STANDING):  amLODIPine   Tablet 5 milliGRAM(s) Oral daily  aspirin  chewable 81 milliGRAM(s) Enteral Tube daily  heparin  Injectable 5000 Unit(s) SubCutaneous every 12 hours  hydrocortisone 1% Cream 1 Application(s) Topical two times a day  levETIRAcetam  Solution 1500 milliGRAM(s) Enteral Tube two times a day  multivitamin 1 Tablet(s) Oral daily  sodium chloride 0.9% lock flush 3 milliLiter(s) IV Push every 8 hours    MEDICATIONS  (PRN):  acetaminophen  Suppository 650 milliGRAM(s) Rectal every 6 hours PRN For Temp greater than 38 C (100.4 F)  sodium chloride 0.65% Nasal 1 Spray(s) Both Nostrils four times a day PRN Congestion      Vital Signs Last 24 Hrs  T(C): 37.5 (15 Kyle 2018 12:26), Max: 37.5 (15 Kyle 2018 12:26)  T(F): 99.5 (15 Kyle 2018 12:26), Max: 99.5 (15 Kyle 2018 12:26)  HR: 74 (15 Kyle 2018 12:26) (71 - 74)  BP: 131/63 (15 Kyle 2018 12:26) (120/68 - 133/78)  BP(mean): --  RR: 19 (15 Kyle 2018 12:26) (18 - 19)  SpO2: 99% (15 Kyle 2018 12:26) (96% - 100%)  CAPILLARY BLOOD GLUCOSE        I&O's Summary      PHYSICAL EXAM  GENERAL: NAD, frail   HEAD:  Atraumatic, Normocephalic  NECK: Supple, No JVD  CHEST/LUNG: Clear to auscultation bilaterally; No wheeze  HEART: Regular rate and rhythm; No murmurs, rubs, or gallops  ABDOMEN: Soft, Nontender, Nondistended; Bowel sounds present, PEG +  EXTREMITIES:  Contracted, No clubbing, cyanosis, or edema  PSYCH: awake, alert  SKIN : no erythema         LABS:                        10.9   14.60 )-----------( 628      ( 15 Kyle 2018 07:35 )             33.8     01-15    144  |  104  |  36<H>  ----------------------------<  131<H>  4.5   |  27  |  0.70    Ca    9.1      15 Kyle 2018 07:35  Phos  3.4     01-15  Mg     2.2     01-15    TPro  7.7  /  Alb  2.5<L>  /  TBili  0.3  /  DBili  x   /  AST  116<H>  /  ALT  155<H>  /  AlkPhos  186<H>  01-15    PT/INR - ( 15 Kyle 2018 07:35 )   PT: 12.7 SEC;   INR: 1.14                    RADIOLOGY & ADDITIONAL TESTS:    Imaging Personally Reviewed:    Consultant(s) Notes Reviewed:      Care Discussed with Consultants/Other Providers:

## 2018-01-16 LAB
ALBUMIN SERPL ELPH-MCNC: 2.7 G/DL — LOW (ref 3.3–5)
ALP SERPL-CCNC: 187 U/L — HIGH (ref 40–120)
ALT FLD-CCNC: 139 U/L — HIGH (ref 4–41)
AST SERPL-CCNC: 100 U/L — HIGH (ref 4–40)
BASOPHILS # BLD AUTO: 0.12 K/UL — SIGNIFICANT CHANGE UP (ref 0–0.2)
BASOPHILS NFR BLD AUTO: 0.8 % — SIGNIFICANT CHANGE UP (ref 0–2)
BILIRUB SERPL-MCNC: 0.3 MG/DL — SIGNIFICANT CHANGE UP (ref 0.2–1.2)
BUN SERPL-MCNC: 37 MG/DL — HIGH (ref 7–23)
CALCIUM SERPL-MCNC: 9.3 MG/DL — SIGNIFICANT CHANGE UP (ref 8.4–10.5)
CHLORIDE SERPL-SCNC: 101 MMOL/L — SIGNIFICANT CHANGE UP (ref 98–107)
CO2 SERPL-SCNC: 29 MMOL/L — SIGNIFICANT CHANGE UP (ref 22–31)
CREAT SERPL-MCNC: 0.7 MG/DL — SIGNIFICANT CHANGE UP (ref 0.5–1.3)
EOSINOPHIL # BLD AUTO: 0.46 K/UL — SIGNIFICANT CHANGE UP (ref 0–0.5)
EOSINOPHIL NFR BLD AUTO: 3.1 % — SIGNIFICANT CHANGE UP (ref 0–6)
GLUCOSE SERPL-MCNC: 145 MG/DL — HIGH (ref 70–99)
HCT VFR BLD CALC: 33.5 % — LOW (ref 39–50)
HGB BLD-MCNC: 10.3 G/DL — LOW (ref 13–17)
IMM GRANULOCYTES # BLD AUTO: 0.23 # — SIGNIFICANT CHANGE UP
IMM GRANULOCYTES NFR BLD AUTO: 1.6 % — HIGH (ref 0–1.5)
INR BLD: 1.12 — SIGNIFICANT CHANGE UP (ref 0.88–1.17)
LYMPHOCYTES # BLD AUTO: 1.45 K/UL — SIGNIFICANT CHANGE UP (ref 1–3.3)
LYMPHOCYTES # BLD AUTO: 9.8 % — LOW (ref 13–44)
MAGNESIUM SERPL-MCNC: 2.2 MG/DL — SIGNIFICANT CHANGE UP (ref 1.6–2.6)
MCHC RBC-ENTMCNC: 28.6 PG — SIGNIFICANT CHANGE UP (ref 27–34)
MCHC RBC-ENTMCNC: 30.7 % — LOW (ref 32–36)
MCV RBC AUTO: 93.1 FL — SIGNIFICANT CHANGE UP (ref 80–100)
MONOCYTES # BLD AUTO: 0.81 K/UL — SIGNIFICANT CHANGE UP (ref 0–0.9)
MONOCYTES NFR BLD AUTO: 5.5 % — SIGNIFICANT CHANGE UP (ref 2–14)
NEUTROPHILS # BLD AUTO: 11.73 K/UL — HIGH (ref 1.8–7.4)
NEUTROPHILS NFR BLD AUTO: 79.2 % — HIGH (ref 43–77)
NRBC # FLD: 0 — SIGNIFICANT CHANGE UP
PHOSPHATE SERPL-MCNC: 3.6 MG/DL — SIGNIFICANT CHANGE UP (ref 2.5–4.5)
PLATELET # BLD AUTO: 678 K/UL — HIGH (ref 150–400)
PMV BLD: 9.3 FL — SIGNIFICANT CHANGE UP (ref 7–13)
POTASSIUM SERPL-MCNC: 4.1 MMOL/L — SIGNIFICANT CHANGE UP (ref 3.5–5.3)
POTASSIUM SERPL-SCNC: 4.1 MMOL/L — SIGNIFICANT CHANGE UP (ref 3.5–5.3)
PROT SERPL-MCNC: 7.9 G/DL — SIGNIFICANT CHANGE UP (ref 6–8.3)
PROTHROM AB SERPL-ACNC: 12.9 SEC — SIGNIFICANT CHANGE UP (ref 9.8–13.1)
RBC # BLD: 3.6 M/UL — LOW (ref 4.2–5.8)
RBC # FLD: 14.7 % — HIGH (ref 10.3–14.5)
SODIUM SERPL-SCNC: 142 MMOL/L — SIGNIFICANT CHANGE UP (ref 135–145)
WBC # BLD: 14.8 K/UL — HIGH (ref 3.8–10.5)
WBC # FLD AUTO: 14.8 K/UL — HIGH (ref 3.8–10.5)

## 2018-01-16 PROCEDURE — 99232 SBSQ HOSP IP/OBS MODERATE 35: CPT | Mod: GC

## 2018-01-16 PROCEDURE — 99232 SBSQ HOSP IP/OBS MODERATE 35: CPT

## 2018-01-16 PROCEDURE — 99233 SBSQ HOSP IP/OBS HIGH 50: CPT

## 2018-01-16 RX ADMIN — SODIUM CHLORIDE 3 MILLILITER(S): 9 INJECTION INTRAMUSCULAR; INTRAVENOUS; SUBCUTANEOUS at 12:53

## 2018-01-16 RX ADMIN — Medication 1 APPLICATION(S): at 06:58

## 2018-01-16 RX ADMIN — SODIUM CHLORIDE 3 MILLILITER(S): 9 INJECTION INTRAMUSCULAR; INTRAVENOUS; SUBCUTANEOUS at 06:54

## 2018-01-16 RX ADMIN — LEVETIRACETAM 1500 MILLIGRAM(S): 250 TABLET, FILM COATED ORAL at 18:20

## 2018-01-16 RX ADMIN — Medication 1 TABLET(S): at 11:23

## 2018-01-16 RX ADMIN — Medication 81 MILLIGRAM(S): at 11:23

## 2018-01-16 RX ADMIN — SODIUM CHLORIDE 3 MILLILITER(S): 9 INJECTION INTRAMUSCULAR; INTRAVENOUS; SUBCUTANEOUS at 23:40

## 2018-01-16 RX ADMIN — Medication 1 APPLICATION(S): at 18:21

## 2018-01-16 RX ADMIN — HEPARIN SODIUM 5000 UNIT(S): 5000 INJECTION INTRAVENOUS; SUBCUTANEOUS at 06:58

## 2018-01-16 RX ADMIN — AMLODIPINE BESYLATE 5 MILLIGRAM(S): 2.5 TABLET ORAL at 06:58

## 2018-01-16 RX ADMIN — HEPARIN SODIUM 5000 UNIT(S): 5000 INJECTION INTRAVENOUS; SUBCUTANEOUS at 18:21

## 2018-01-16 RX ADMIN — LEVETIRACETAM 1500 MILLIGRAM(S): 250 TABLET, FILM COATED ORAL at 06:57

## 2018-01-16 NOTE — PROGRESS NOTE ADULT - ATTENDING COMMENTS
patient with improving liver test abnormalities after discontinuation of antiseizure meds.  Also note low albumin.  Patient on g-tube feedings tolerating well.

## 2018-01-16 NOTE — PROGRESS NOTE BEHAVIORAL HEALTH - RISK ASSESSMENT
pt is unable to care for himself and could be a risk to himself unless he is supervised. Pt is not an acute risk to himself or others.
pt is unable to care for himself and could be a risk to himself unless he is supervised. Pt is not an acute risk to himself or others.

## 2018-01-16 NOTE — PROGRESS NOTE BEHAVIORAL HEALTH - NSBHFUPINTERVALHXFT_PSY_A_CORE
Pt has been calm and pleasantly confused. No behavioral issues.
Pt has been calm and pleasantly confused. No behavioral issues.

## 2018-01-16 NOTE — PROGRESS NOTE BEHAVIORAL HEALTH - NSBHCHARTREVIEWINVESTIGATE_PSY_A_CORE FT
Ventricular Rate 84 BPM    Atrial Rate 84 BPM    P-R Interval 142 ms    QRS Duration 108 ms    Q-T Interval 404 ms    QTC Calculation(Bezet) 477 ms    P Axis 42 degrees    R Axis 25 degrees    T Axis 66 degrees    Diagnosis Line Normal sinus rhythm  Voltage criteria for left ventricular hypertrophy  Nonspecific ST abnormality  Abnormal ECG
Ventricular Rate 84 BPM    Atrial Rate 84 BPM    P-R Interval 142 ms    QRS Duration 108 ms    Q-T Interval 404 ms    QTC Calculation(Bezet) 477 ms    P Axis 42 degrees    R Axis 25 degrees    T Axis 66 degrees    Diagnosis Line Normal sinus rhythm  Voltage criteria for left ventricular hypertrophy  Nonspecific ST abnormality  Abnormal ECG

## 2018-01-16 NOTE — PROGRESS NOTE BEHAVIORAL HEALTH - SUMMARY
Pt is a 77yr old man, who was found outdoors shoveling snow of random houses for 2 hours, when a concerned neighbor called 911. EMS arrived and found the patient to be soaking wet, The pt was taken to the Ed. in the Ed, the patient stated he was shoveling snow at his house and does not know why he is at the hospital. While in the ED, he was noted to have had 2 undescribed seizures and treated with 2mg Ativan IV.   The pt had a CT Head that is preliminary clear of acute findings, seen by neurology. Pt currently is minimally verbal and unable to discuss his medical condition or answer any questions about himself. He currently lacks capacity to participate in discharge planning.    Pt has been calm and pleasant with no behavorial issues.
Pt is a 77yr old man, who was found outdoors shoveling snow of random houses for 2 hours, when a concerned neighbor called 911. EMS arrived and found the patient to be soaking wet, The pt was taken to the Ed. in the Ed, the patient stated he was shoveling snow at his house and does not know why he is at the hospital. While in the ED, he was noted to have had 2 undescribed seizures and treated with 2mg Ativan IV.   The pt had a CT Head that is preliminary clear of acute findings, seen by neurology. Pt currently is minimally verbal and unable to discuss his medical condition or answer any questions about himself. He currently lacks capacity to participate in discharge planning.    Pt has been calm and pleasant with no behavorial issues.

## 2018-01-16 NOTE — PROGRESS NOTE ADULT - PROBLEM SELECTOR PLAN 4
Pt not septic now. Rectal temp 100.1 in PM, clinically unchanged. observe for fever.   leucocytosis and low grade fever (1/5-1/6), may be secondary to recent PEG, CT-c/a/p no acute pathology  s/p short course of empiric abx  blood cx remains neg  persistent leucocytosis  will request ID eval

## 2018-01-16 NOTE — PROGRESS NOTE BEHAVIORAL HEALTH - NSBHCHARTREVIEWVS_PSY_A_CORE FT
Vital Signs Last 24 Hrs  T(C): 37.1 (16 Jan 2018 12:45), Max: 37.2 (15 Kyle 2018 21:26)  T(F): 98.7 (16 Jan 2018 12:45), Max: 99 (16 Jan 2018 06:53)  HR: 76 (16 Jan 2018 12:45) (74 - 76)  BP: 115/70 (16 Jan 2018 12:45) (106/69 - 124/81)  BP(mean): --  RR: 18 (16 Jan 2018 12:45) (17 - 18)  SpO2: 99% (16 Jan 2018 12:45) (97% - 100%)
Vital Signs Last 24 Hrs  T(C): 37.7 (02 Jan 2018 12:56), Max: 37.7 (02 Jan 2018 12:56)  T(F): 99.9 (02 Jan 2018 12:56), Max: 99.9 (02 Jan 2018 12:56)  HR: 76 (02 Jan 2018 12:56) (69 - 80)  BP: 148/80 (02 Jan 2018 12:56) (114/64 - 148/80)  BP(mean): --  RR: 17 (02 Jan 2018 12:56) (17 - 18)  SpO2: 96% (02 Jan 2018 12:56) (96% - 100%)

## 2018-01-16 NOTE — PROGRESS NOTE BEHAVIORAL HEALTH - NSBHCONSULTMEDS_PSY_A_CORE FT
Consider Seroquel 25mg po qhs and as a prn q6hrs prn agitation.
Consider Seroquel 25mg po qhs and as a prn q6hrs prn agitation.

## 2018-01-16 NOTE — PROGRESS NOTE BEHAVIORAL HEALTH - NSBHCONSULTFOLLOWAFTERCARE_PSY_A_CORE FT
outpt psychiatric f/u at [facility] [phone] or can be followed by the consulting psychiatrist at the rehab facility or nursing home upon discharge.
outpt psychiatric f/u at [facility] [phone] or can be followed by the consulting psychiatrist at the rehab facility or nursing home upon discharge.

## 2018-01-16 NOTE — PROGRESS NOTE BEHAVIORAL HEALTH - NSBHCHARTREVIEWIMAGING_PSY_A_CORE FT
< from: CT Head No Cont (12.24.17 @ 18:59) >    IMPRESSION:   No intracranial hemorrhage, mass effect, or midline shift.   Mild ventriculomegaly superimposed on cerebral volume loss, unchanged.      < from: MR Head No Cont (12.26.17 @ 11:05) >    IMPRESSION:    Abnormal signal involves the right cingulate gyrus-medial frontal  parietal cortex as described. Some considerations include evolving   subacute ischemia, postictal cortical edema, or encephalitis.    A 1.8 cm x 1 cm nonspecific right parietal scalp lesion is present at the   vertex. Although this could reflect a sebaceous cyst, correlate with   direct visualization findings to exclude other etiologies for scalp   lesions.      < from: US Abdomen Doppler (01.02.18 @ 12:15) >    FINDINGS:    Liver: Within normal limits.  Bile ducts: Normal caliber. Common hepatic duct measures 3 mm.   Gallbladder: Within normal limits.      Pancreas: Obscured by overlying bowel gas.  Right kidney: 7.9 cm. No hydronephrosis.  Ascites: None.  IVC: Visualized portions are within normal limits.    The main, right and left portal veins are patent.    IMPRESSION:     Patent portal vein.
< from: CT Head No Cont (12.24.17 @ 18:59) >    IMPRESSION:   No intracranial hemorrhage, mass effect, or midline shift.   Mild ventriculomegaly superimposed on cerebral volume loss, unchanged.      < from: MR Head No Cont (12.26.17 @ 11:05) >    IMPRESSION:    Abnormal signal involves the right cingulate gyrus-medial frontal  parietal cortex as described. Some considerations include evolving   subacute ischemia, postictal cortical edema, or encephalitis.    A 1.8 cm x 1 cm nonspecific right parietal scalp lesion is present at the   vertex. Although this could reflect a sebaceous cyst, correlate with   direct visualization findings to exclude other etiologies for scalp   lesions.      < from: US Abdomen Doppler (01.02.18 @ 12:15) >    FINDINGS:    Liver: Within normal limits.  Bile ducts: Normal caliber. Common hepatic duct measures 3 mm.   Gallbladder: Within normal limits.      Pancreas: Obscured by overlying bowel gas.  Right kidney: 7.9 cm. No hydronephrosis.  Ascites: None.  IVC: Visualized portions are within normal limits.    The main, right and left portal veins are patent.    IMPRESSION:     Patent portal vein.

## 2018-01-16 NOTE — PROGRESS NOTE ADULT - SUBJECTIVE AND OBJECTIVE BOX
Patient is a 77y old  Male who presents with a chief complaint of Seizure x 1 day (16 Dec 2017 01:26)      SUBJECTIVE / OVERNIGHT EVENTS:    MEDICATIONS  (STANDING):  amLODIPine   Tablet 5 milliGRAM(s) Oral daily  aspirin  chewable 81 milliGRAM(s) Enteral Tube daily  heparin  Injectable 5000 Unit(s) SubCutaneous every 12 hours  hydrocortisone 1% Cream 1 Application(s) Topical two times a day  levETIRAcetam  Solution 1500 milliGRAM(s) Enteral Tube two times a day  multivitamin 1 Tablet(s) Oral daily  sodium chloride 0.9% lock flush 3 milliLiter(s) IV Push every 8 hours    MEDICATIONS  (PRN):  acetaminophen  Suppository 650 milliGRAM(s) Rectal every 6 hours PRN For Temp greater than 38 C (100.4 F)  sodium chloride 0.65% Nasal 1 Spray(s) Both Nostrils four times a day PRN Congestion      Vital Signs Last 24 Hrs  T(C): 37.1 (16 Jan 2018 12:45), Max: 37.2 (15 Kyle 2018 21:26)  T(F): 98.7 (16 Jan 2018 12:45), Max: 99 (16 Jan 2018 06:53)  HR: 76 (16 Jan 2018 12:45) (74 - 76)  BP: 115/70 (16 Jan 2018 12:45) (106/69 - 124/81)  BP(mean): --  RR: 18 (16 Jan 2018 12:45) (17 - 18)  SpO2: 99% (16 Jan 2018 12:45) (97% - 100%)  CAPILLARY BLOOD GLUCOSE        I&O's Summary      PHYSICAL EXAM:  GENERAL: NAD, frail   HEAD:  Atraumatic, Normocephalic  NECK: Supple, No JVD  CHEST/LUNG: Clear to auscultation bilaterally; No wheeze  HEART: Regular rate and rhythm; No murmurs, rubs, or gallops  ABDOMEN: Soft, Nontender, Nondistended; Bowel sounds present, PEG +  EXTREMITIES:  Contracted, No clubbing, cyanosis, or edema  PSYCH: awake, alert  SKIN : no erythema       LABS:                        10.3   14.80 )-----------( 678      ( 16 Jan 2018 06:12 )             33.5     01-16    142  |  101  |  37<H>  ----------------------------<  145<H>  4.1   |  29  |  0.70    Ca    9.3      16 Jan 2018 06:12  Phos  3.6     01-16  Mg     2.2     01-16    TPro  7.9  /  Alb  2.7<L>  /  TBili  0.3  /  DBili  x   /  AST  100<H>  /  ALT  139<H>  /  AlkPhos  187<H>  01-16    PT/INR - ( 16 Jan 2018 06:12 )   PT: 12.9 SEC;   INR: 1.12                    RADIOLOGY & ADDITIONAL TESTS:    Imaging Personally Reviewed:    Consultant(s) Notes Reviewed:  Psychiatry GI    Care Discussed with Consultants/Other Providers:

## 2018-01-16 NOTE — CHART NOTE - NSCHARTNOTEFT_GEN_A_CORE
Patient is a 77 year old male with seizure disorder and dementia.  Patient lacks the capacity to make medical decisions.  Ethics called to assist in determination of a surrogate decision-maker in the absence of a Healthcare Proxy form.  The patient has a step-daughter Chelita and a brother Kyle.  The patient is also reported to have adult biological children.  The Family Healthcare Decisions Act of 2010 (FHCDA) outlines a hierarchy to determine the patient's surrogate decision-maker.  - Patient (if capacity)  - Legally Appointed Guardian  - Spouse or Domestic Partner  - Adult Children  - Parents  - Adult Siblings  - Close Friend    Under the FHCDA, of the two family members who have been involved, the patient's brother Kyle would be considered the legal surrogate to assist in medical decision-making.  The patient's step daughter would be considered a "close friend" in the hierarchy unless there is evidence of legal adoption by the patient.    If the patient's adult children become involved, they would be considered the patient's surrogate decision-makers.    Reference  [place] Law Article 29-CC (“The Family Health Care Decisions Act”) and amending various other laws. §2994-g. 5(b)(I) and (ii).     Abdullahi Escalante, MPH  Ethics Fellow  [phone]

## 2018-01-16 NOTE — PROGRESS NOTE ADULT - PROBLEM SELECTOR PLAN 1
slowly  improving    repeat abd US with doppler (1/12) unremarkable  follow up hepatology  Hepatitis profile negative, may be drug induced,  dilantin / lipitor Dced,   , avoid hepatotoxic drugs  CT a/p unremarkable  GI f/u noted,    AMA, ASMA, Anti LKM  negative   likely drug induced liver injury  GI recommends liver bx if enzymes worsen

## 2018-01-16 NOTE — PROGRESS NOTE BEHAVIORAL HEALTH - NSBHCONSULTMEDAGITATION_PSY_A_CORE FT
Seroquel 25mg po q6hrs prn agitation  Zyprexa 1.25mg IM q6hrs prn severe agitation
Seroquel 25mg po q6hrs prn agitation  Zyprexa 1.25mg IM q6hrs prn severe agitation

## 2018-01-16 NOTE — PROGRESS NOTE ADULT - ASSESSMENT
76 y/o M with Seizure Disorder, Dementia presents with sepsis and found to have elevated liver enzymes.    Impression:  1) Elevated Liver Enzymes - suspect related to Drug Induced Liver Injury (due to antiepileptic agents) vs Sepsis. Medications including fosphenytoin, keppra and lacosemide have all been associated with liver injury. Numbers are overall downtrending.  2) Seizure Disorder   3) Leukocytosis needs workup to rule out infection    Plan:  - Monitor liver enzymes  - Avoid hepatotoxic agents  - If liver enzymes rise, will consider liver biopsy to evaluate for etiology

## 2018-01-16 NOTE — PROGRESS NOTE ADULT - SUBJECTIVE AND OBJECTIVE BOX
- Pt denies abdominal pain  - No nausea, vomiting, pruritis  - No fever or chills    OBJECTIVE:    Vital Signs Last 24 Hrs  T(C): 37.2 (16 Jan 2018 06:53), Max: 37.5 (15 Kyle 2018 12:26)  T(F): 99 (16 Jan 2018 06:53), Max: 99.5 (15 Kyle 2018 12:26)  HR: 75 (16 Jan 2018 06:53) (74 - 75)  BP: 124/81 (16 Jan 2018 06:53) (106/69 - 131/63)  RR: 18 (16 Jan 2018 06:53) (17 - 19)  SpO2: 97% (16 Jan 2018 06:53) (97% - 100%)    PHYSICAL EXAM:  Constitutional: no acute distress  Eyes: no icterus  Neck: no masses, no LAD  Respiratory: normal inspiratory effort; no wheezing or crackles  Cardiovascular: RRR, normal S1/S2, no murmurs/rubs/gallops  Gastrointestinal: soft, nondistended, nontender, +BS  Extremities: no LE edema  Neurological: AAOx3, no asterixis    LABS:                        10.3   14.80 )-----------( 678      ( 16 Jan 2018 06:12 )             33.5     142  |  101  |  37<H>  ----------------------------<  145<H>  4.1   |  29  |  0.70    Ca    9.3      16 Jan 2018 06:12  Phos  3.6     01-16  Mg     2.2     01-16    TPro  7.9  /  Alb  2.7<L>  /  TBili  0.3  /  DBili  x   /  AST  100<H>  /  ALT  139<H>  /  AlkPhos  187<H>  01-16  PT/INR - ( 16 Jan 2018 06:12 )   PT: 12.9 SEC;   INR: 1.12     LIVER FUNCTIONS - ( 16 Jan 2018 06:12 )  Alb: 2.7 g/dL / Pro: 7.9 g/dL / ALK PHOS: 187 u/L / ALT: 139 u/L / AST: 100 u/L / GGT: x

## 2018-01-16 NOTE — PROGRESS NOTE BEHAVIORAL HEALTH - NSBHCHARTREVIEWLAB_PSY_A_CORE FT
10.3   14.80 )-----------( 678      ( 16 Jan 2018 06:12 )             33.5   01-16    142  |  101  |  37<H>  ----------------------------<  145<H>  4.1   |  29  |  0.70    Ca    9.3      16 Jan 2018 06:12  Phos  3.6     01-16  Mg     2.2     01-16    TPro  7.9  /  Alb  2.7<L>  /  TBili  0.3  /  DBili  x   /  AST  100<H>  /  ALT  139<H>  /  AlkPhos  187<H>  01-16
12.3   14.51 )-----------( 687      ( 02 Jan 2018 06:03 )             37.2     01-02    132<L>  |  99  |  13  ----------------------------<  127<H>  4.5   |  21<L>  |  0.87    Ca    8.9      02 Jan 2018 06:03  Phos  3.2     01-02  Mg     2.0     01-02    TPro  7.2  /  Alb  2.6<L>  /  TBili  0.4  /  DBili  x   /  AST  73<H>  /  ALT  71<H>  /  AlkPhos  259<H>  01-02

## 2018-01-17 LAB
ALBUMIN SERPL ELPH-MCNC: 2.7 G/DL — LOW (ref 3.3–5)
ALP SERPL-CCNC: 192 U/L — HIGH (ref 40–120)
ALT FLD-CCNC: 124 U/L — HIGH (ref 4–41)
AST SERPL-CCNC: 87 U/L — HIGH (ref 4–40)
BASOPHILS # BLD AUTO: 0.09 K/UL — SIGNIFICANT CHANGE UP (ref 0–0.2)
BASOPHILS NFR BLD AUTO: 0.5 % — SIGNIFICANT CHANGE UP (ref 0–2)
BILIRUB SERPL-MCNC: 0.4 MG/DL — SIGNIFICANT CHANGE UP (ref 0.2–1.2)
BUN SERPL-MCNC: 39 MG/DL — HIGH (ref 7–23)
CALCIUM SERPL-MCNC: 9.5 MG/DL — SIGNIFICANT CHANGE UP (ref 8.4–10.5)
CHLORIDE SERPL-SCNC: 102 MMOL/L — SIGNIFICANT CHANGE UP (ref 98–107)
CO2 SERPL-SCNC: 29 MMOL/L — SIGNIFICANT CHANGE UP (ref 22–31)
CREAT SERPL-MCNC: 0.75 MG/DL — SIGNIFICANT CHANGE UP (ref 0.5–1.3)
EOSINOPHIL # BLD AUTO: 0.44 K/UL — SIGNIFICANT CHANGE UP (ref 0–0.5)
EOSINOPHIL NFR BLD AUTO: 2.7 % — SIGNIFICANT CHANGE UP (ref 0–6)
GLUCOSE SERPL-MCNC: 106 MG/DL — HIGH (ref 70–99)
HCT VFR BLD CALC: 37.1 % — LOW (ref 39–50)
HGB BLD-MCNC: 11.7 G/DL — LOW (ref 13–17)
IMM GRANULOCYTES # BLD AUTO: 0.2 # — SIGNIFICANT CHANGE UP
IMM GRANULOCYTES NFR BLD AUTO: 1.2 % — SIGNIFICANT CHANGE UP (ref 0–1.5)
LYMPHOCYTES # BLD AUTO: 1.67 K/UL — SIGNIFICANT CHANGE UP (ref 1–3.3)
LYMPHOCYTES # BLD AUTO: 10.1 % — LOW (ref 13–44)
MAGNESIUM SERPL-MCNC: 2.2 MG/DL — SIGNIFICANT CHANGE UP (ref 1.6–2.6)
MCHC RBC-ENTMCNC: 29.9 PG — SIGNIFICANT CHANGE UP (ref 27–34)
MCHC RBC-ENTMCNC: 31.5 % — LOW (ref 32–36)
MCV RBC AUTO: 94.9 FL — SIGNIFICANT CHANGE UP (ref 80–100)
MONOCYTES # BLD AUTO: 0.8 K/UL — SIGNIFICANT CHANGE UP (ref 0–0.9)
MONOCYTES NFR BLD AUTO: 4.9 % — SIGNIFICANT CHANGE UP (ref 2–14)
NEUTROPHILS # BLD AUTO: 13.26 K/UL — HIGH (ref 1.8–7.4)
NEUTROPHILS NFR BLD AUTO: 80.6 % — HIGH (ref 43–77)
NRBC # FLD: 0 — SIGNIFICANT CHANGE UP
PHOSPHATE SERPL-MCNC: 3.6 MG/DL — SIGNIFICANT CHANGE UP (ref 2.5–4.5)
PLATELET # BLD AUTO: 656 K/UL — HIGH (ref 150–400)
PMV BLD: 9.4 FL — SIGNIFICANT CHANGE UP (ref 7–13)
POTASSIUM SERPL-MCNC: 3.9 MMOL/L — SIGNIFICANT CHANGE UP (ref 3.5–5.3)
POTASSIUM SERPL-SCNC: 3.9 MMOL/L — SIGNIFICANT CHANGE UP (ref 3.5–5.3)
PROT SERPL-MCNC: 8.2 G/DL — SIGNIFICANT CHANGE UP (ref 6–8.3)
RBC # BLD: 3.91 M/UL — LOW (ref 4.2–5.8)
RBC # FLD: 14.9 % — HIGH (ref 10.3–14.5)
SODIUM SERPL-SCNC: 145 MMOL/L — SIGNIFICANT CHANGE UP (ref 135–145)
WBC # BLD: 16.46 K/UL — HIGH (ref 3.8–10.5)
WBC # FLD AUTO: 16.46 K/UL — HIGH (ref 3.8–10.5)

## 2018-01-17 PROCEDURE — 99232 SBSQ HOSP IP/OBS MODERATE 35: CPT | Mod: GC

## 2018-01-17 PROCEDURE — 99233 SBSQ HOSP IP/OBS HIGH 50: CPT

## 2018-01-17 RX ADMIN — Medication 81 MILLIGRAM(S): at 13:29

## 2018-01-17 RX ADMIN — AMLODIPINE BESYLATE 5 MILLIGRAM(S): 2.5 TABLET ORAL at 05:49

## 2018-01-17 RX ADMIN — Medication 1 APPLICATION(S): at 05:51

## 2018-01-17 RX ADMIN — HEPARIN SODIUM 5000 UNIT(S): 5000 INJECTION INTRAVENOUS; SUBCUTANEOUS at 05:49

## 2018-01-17 RX ADMIN — LEVETIRACETAM 1500 MILLIGRAM(S): 250 TABLET, FILM COATED ORAL at 05:48

## 2018-01-17 RX ADMIN — SODIUM CHLORIDE 3 MILLILITER(S): 9 INJECTION INTRAMUSCULAR; INTRAVENOUS; SUBCUTANEOUS at 05:50

## 2018-01-17 RX ADMIN — HEPARIN SODIUM 5000 UNIT(S): 5000 INJECTION INTRAVENOUS; SUBCUTANEOUS at 17:29

## 2018-01-17 RX ADMIN — Medication 1 TABLET(S): at 12:41

## 2018-01-17 RX ADMIN — LEVETIRACETAM 1500 MILLIGRAM(S): 250 TABLET, FILM COATED ORAL at 17:29

## 2018-01-17 RX ADMIN — SODIUM CHLORIDE 3 MILLILITER(S): 9 INJECTION INTRAMUSCULAR; INTRAVENOUS; SUBCUTANEOUS at 21:34

## 2018-01-17 RX ADMIN — SODIUM CHLORIDE 3 MILLILITER(S): 9 INJECTION INTRAMUSCULAR; INTRAVENOUS; SUBCUTANEOUS at 12:39

## 2018-01-17 RX ADMIN — Medication 1 APPLICATION(S): at 17:29

## 2018-01-17 NOTE — PROGRESS NOTE ADULT - ATTENDING COMMENTS
case discussed with SW  will plan discharge to rehab, LFTs and leucocytosis can be monitored in the rehab

## 2018-01-17 NOTE — PROGRESS NOTE ADULT - ASSESSMENT
77M PMH Dementia admitted 12/15 for altered mental status as was found confused shoveling snow at strangers houses, course c/b seizures in ED, started on AEDs, also found to have sepsis of unclear etiology, s/p empiric antibiotics/antivirals for encephalitis, now off antibiotics.  Also failed SSW eval s/p PEG tube 1/4/18.  ID called back as patient has persistent leukocytosis of unclear etiology.      # Leukocytosis - repeat blood cultures negative, RVP negative. CT Chest/Abdomen/Pelvis negative for infection. 77M PMH Dementia admitted 12/15 for altered mental status as was found confused shoveling snow at strangers houses, course c/b seizures in ED, started on AEDs, also found to have sepsis of unclear etiology, s/p empiric antibiotics/antivirals for encephalitis, now off antibiotics.  Also failed SSW eval s/p PEG tube 1/4/18.  ID called back as patient has persistent leukocytosis of unclear etiology.      # Leukocytosis - repeat blood cultures negative, RVP negative. CT Chest/Abdomen/Pelvis negative for infection.  ?2/2 recurrent aspiration. 77M PMH Dementia admitted 12/15 for altered mental status as was found confused shoveling snow at strangers houses, course c/b seizures in ED, started on AEDs, also found to have sepsis of unclear etiology, s/p empiric antibiotics/antivirals for encephalitis, now off antibiotics.  Also failed SSW eval s/p PEG tube 1/4/18.  ID called back as patient has persistent leukocytosis of unclear etiology.      # Leukocytosis - unclear etiology, repeat blood cultures negative, RVP negative. CT Chest/Abdomen/Pelvis negative for infection.  ?2/2 recurrent aspiration vs. stress from recent PEG tube   - would observe off antibiotics. If febrile, would get blood and urine cultures.

## 2018-01-17 NOTE — PROGRESS NOTE ADULT - PROBLEM SELECTOR PLAN 4
Pt not septic now. , clinically unchanged.   Pt has persistent leucocytosis , CT-c/a/p no acute pathology  s/p short course of empiric abx  blood cx remains neg  persistent leucocytosis   ID eval noted , w/u so  far negative, recommend monitor off Abx , repeat blood and urine  cx if fever febrile

## 2018-01-17 NOTE — PROGRESS NOTE ADULT - SUBJECTIVE AND OBJECTIVE BOX
77M PMH Dementia admitted 12/15 for altered mental status as was found confused shoveling snow at strangers houses, course c/b seizures in ED, started on AEDs, also found to have sepsis of unclear etiology, was treated with empiric antibiotics/antivirals for encephalitis, now off antibiotics.  Also failed SSW eval s/p PEG tube 1/4/18.  ID called back as patient has persistent leukocytosis of unclear etiology.          Antimicrobials:  None      REVIEW OF SYSTEMS  General:	Denies any malaise fatigue or chills. Fevers absent    Skin:No rash  	  Ophthalmologic:Denies any visual complaints,discharge redness or photophobia  	  ENMT:No nasal discharge,headache,sinus congestion or throat pain.No dental complaints    Respiratory and Thorax:No cough,sputum or chest pain.Denies shortness of breath  	  Cardiovascular:	No chest pain,palpitaions or dizziness    Gastrointestinal:	NO nausea,abdominal pain or diarrhea.    Genitourinary:	No dysuria,frequency. No flank pain    Musculoskeletal:	No joint swelling or pain.No weakness    Neurological:No confusion,diziness.No extremity weakness.No bladder or bowel incontinence	      Allergic/Immunologic:	No hives or rash   Vital Signs Last 24 Hrs  T(C): 36.8 (01-17-18 @ 05:47), Max: 37.1 (01-16-18 @ 12:45)  T(F): 98.2 (01-17-18 @ 05:47), Max: 98.7 (01-16-18 @ 12:45)  HR: 68 (01-17-18 @ 05:47) (68 - 81)  BP: 129/68 (01-17-18 @ 05:47) (113/68 - 129/68)  BP(mean): --  RR: 17 (01-17-18 @ 05:47) (17 - 18)  SpO2: 100% (01-17-18 @ 05:47) (99% - 100%)    PHYSICAL EXAM:   Constitutional: Comfortable.Awake and alert  Eyes:PERRL EOMI.NO discharge or conjunctival injection  ENMT:No sinus tenderness.No thrush.No pharyngeal exudate or erythema.Fair dental hygiene  Neck:Supple,No LN,no JVD  Respiratory:Good air entry bilaterally,CTA  Cardiovascular:S1 S2 wnl, No murmurs,rub or gallops  Gastrointestinal:Soft BS(+) no tenderness no masses ,No rebound or guarding  Genitourinary:No CVA tendereness   Extremities:No cyanosis,clubbing or edema.  Vascular:peripheral pulses felt  Neurological:AAO X 3,No grossly focal deficits  Skin:No rash   Lymph Nodes:No palpable LNs  Musculoskeletal:No joint swelling or LOM  Psychiatric:Affect normal.                        11.7   16.46 )-----------( 656      ( 17 Jan 2018 05:19 )             37.1   01-17    145  |  102  |  39<H>  ----------------------------<  106<H>  3.9   |  29  |  0.75    Ca    9.5      17 Jan 2018 05:19  Phos  3.6     01-17  Mg     2.2     01-17    TPro  8.2  /  Alb  2.7<L>  /  TBili  0.4  /  DBili  x   /  AST  87<H>  /  ALT  124<H>  /  AlkPhos  192<H>  01-17      LIVER FUNCTIONS - ( 17 Jan 2018 05:19 )  Alb: 2.7 g/dL / Pro: 8.2 g/dL / ALK PHOS: 192 u/L / ALT: 124 u/L / AST: 87 u/L / GGT: x           RECENT CULTURES:  12/22 - blood cultures negative x 2, urine culture negative  1/5 - blood cultures negative x 2          Radiology:  CTH negative for acute infarct/hemorrhage - cerebral volume loss   CT CAP - atelectasis, multiple liver hypoattenuated lesions, s/p prostatectomy, diverticulosis, pre-sacral fluid, PEG in place  US Abdomen w/ doppler - within normal limits   MRI head - R cingulate gyrus- medial frontal parietal cortex - c/w possible subacute ischemia vs postictal edema vs encephalitis, + R parietal scalp lesion c/w subaceous cyst 77M PMH Dementia admitted 12/15 for altered mental status as was found confused shoveling snow at strangers houses, course c/b seizures in ED, started on AEDs, also found to have sepsis of unclear etiology, was treated with empiric antibiotics/antivirals for encephalitis, now off antibiotics.  Also failed SSW eval s/p PEG tube 1/4/18.  ID called back as patient has persistent leukocytosis of unclear etiology.      Interval events:  Patient AAOx2, keeps asking where his wife is.  Does not know why he is in the hospital, says he does not remember coming here.  Denies shortness of breath, cough, abdominal pain, diarrhea, rash.       Antimicrobials:  None    REVIEW OF SYSTEMS  General: No fevers, no malaise   Skin: No rash	  ENMT: No nasal discharge or throat pain.  Respiratory and Thorax: No cough, sputum or chest pain. Denies shortness of breath  Cardiovascular:	No chest pain, palpitations, or dizziness  Gastrointestinal:	No nausea, abdominal pain or diarrhea.  Genitourinary: No dysuria, frequency. No flank pain  Musculoskeletal:	No joint swelling or pain. +bilateral lower extremity weakness   Neurological: +confused. No bladder or bowel incontinence    Vital Signs Last 24 Hrs  T(C): 36.8 (01-17-18 @ 05:47), Max: 37.1 (01-16-18 @ 12:45)  T(F): 98.2 (01-17-18 @ 05:47), Max: 98.7 (01-16-18 @ 12:45)  HR: 68 (01-17-18 @ 05:47) (68 - 81)  BP: 129/68 (01-17-18 @ 05:47) (113/68 - 129/68)  BP(mean): --  RR: 17 (01-17-18 @ 05:47) (17 - 18)  SpO2: 100% (01-17-18 @ 05:47) (99% - 100%)    PHYSICAL EXAM:   Constitutional: cachectic, NAD   Eyes: PERRL EOMI   ENMT: No sinus tenderness. No thrush. No pharyngeal exudate or erythema  Neck: Supple,No LN, no JVD  Respiratory: CTAB   Cardiovascular:S1 S2 wnl, No murmurs  Gastrointestinal: Soft BS(+) no tenderness no masses ,No rebound or guarding  Genitourinary: No CVA tenderness   Extremities: No cyanosis,clubbing,or edema  Neurological:AAO X 2 (name, place), No grossly focal deficits  Skin: No rash. + R hip skin abrasion c/d/i.  no sacral decub.  +R hand peripheral IV c/d/i  Lymph Nodes: No palpable LNs  Musculoskeletal: No joint swelling or LOM                          11.7   16.46 )-----------( 656      ( 17 Jan 2018 05:19 )             37.1   01-17    145  |  102  |  39<H>  ----------------------------<  106<H>  3.9   |  29  |  0.75    Ca    9.5      17 Jan 2018 05:19  Phos  3.6     01-17  Mg     2.2     01-17    TPro  8.2  /  Alb  2.7<L>  /  TBili  0.4  /  DBili  x   /  AST  87<H>  /  ALT  124<H>  /  AlkPhos  192<H>  01-17      LIVER FUNCTIONS - ( 17 Jan 2018 05:19 )  Alb: 2.7 g/dL / Pro: 8.2 g/dL / ALK PHOS: 192 u/L / ALT: 124 u/L / AST: 87 u/L / GGT: x           RECENT CULTURES:  12/22 - blood cultures negative x 2, urine culture negative  1/5 - blood cultures negative x 2    Radiology:  CTH negative for acute infarct/hemorrhage - cerebral volume loss   CT CAP - atelectasis, multiple liver hypoattenuated lesions, s/p prostatectomy, diverticulosis, pre-sacral fluid, PEG in place  US Abdomen w/ doppler - within normal limits   MRI head - R cingulate gyrus- medial frontal parietal cortex - c/w possible subacute ischemia vs postictal edema vs encephalitis, + R parietal scalp lesion c/w subaceous cyst

## 2018-01-17 NOTE — PROGRESS NOTE ADULT - ATTENDING COMMENTS
-hold off abx  -nonlocalizing exam  -not septic  -no fever  -no s/s of phlebitis/gout/rash/decubitus  -if fever, panculture  -if diarrhea, check cdiff    Babak Reyes  Attending Physician   Division of Infectious Disease  Pager #[phone]  After 5pm/weekend or no response, call #[phone]

## 2018-01-17 NOTE — PROGRESS NOTE ADULT - PROBLEM SELECTOR PLAN 1
slowly  improving    repeat abd US with doppler (1/12) unremarkable  follow up hepatology  Hepatitis profile negative, may be drug induced,  dilantin / lipitor Dced,   , avoid hepatotoxic drugs  CT a/p unremarkable  GI f/u noted,    AMA, ASMA, Anti LKM  negative   likely drug induced liver injury  GI recommends liver bx if enzymes worsen, currently trending down

## 2018-01-17 NOTE — PROGRESS NOTE ADULT - SUBJECTIVE AND OBJECTIVE BOX
Patient is a 77y old  Male who presents with a chief complaint of Seizure x 1 day (16 Dec 2017 01:26)      SUBJECTIVE / OVERNIGHT EVENTS: patient seen and examined by bedside at 10:10 Am, no acute distress noted, no acute events over night. Pt appears depressed           MEDICATIONS  (STANDING):  amLODIPine   Tablet 5 milliGRAM(s) Oral daily  aspirin  chewable 81 milliGRAM(s) Enteral Tube daily  heparin  Injectable 5000 Unit(s) SubCutaneous every 12 hours  hydrocortisone 1% Cream 1 Application(s) Topical two times a day  levETIRAcetam  Solution 1500 milliGRAM(s) Enteral Tube two times a day  multivitamin 1 Tablet(s) Oral daily  sodium chloride 0.9% lock flush 3 milliLiter(s) IV Push every 8 hours    MEDICATIONS  (PRN):  acetaminophen  Suppository 650 milliGRAM(s) Rectal every 6 hours PRN For Temp greater than 38 C (100.4 F)  sodium chloride 0.65% Nasal 1 Spray(s) Both Nostrils four times a day PRN Congestion      Vital Signs Last 24 Hrs  T(C): 36.8 (17 Jan 2018 05:47), Max: 36.8 (17 Jan 2018 05:47)  T(F): 98.2 (17 Jan 2018 05:47), Max: 98.2 (17 Jan 2018 05:47)  HR: 68 (17 Jan 2018 05:47) (68 - 81)  BP: 129/68 (17 Jan 2018 05:47) (113/68 - 129/68)  BP(mean): --  RR: 17 (17 Jan 2018 05:47) (17 - 17)  SpO2: 100% (17 Jan 2018 05:47) (100% - 100%)  CAPILLARY BLOOD GLUCOSE        I&O's Summary      PHYSICAL EXAM:  GENERAL: NAD, frail   HEAD:  Atraumatic, Normocephalic  NECK: Supple, No JVD  CHEST/LUNG: Clear to auscultation bilaterally; No wheeze  HEART: Regular rate and rhythm; No murmurs, rubs, or gallops  ABDOMEN: Soft, Nontender, Nondistended; Bowel sounds present, PEG +  EXTREMITIES:  Contracted, No clubbing, cyanosis, or edema  PSYCH: awake, alert  SKIN : no erythema     LABS:                        11.7   16.46 )-----------( 656      ( 17 Jan 2018 05:19 )             37.1     01-17    145  |  102  |  39<H>  ----------------------------<  106<H>  3.9   |  29  |  0.75    Ca    9.5      17 Jan 2018 05:19  Phos  3.6     01-17  Mg     2.2     01-17    TPro  8.2  /  Alb  2.7<L>  /  TBili  0.4  /  DBili  x   /  AST  87<H>  /  ALT  124<H>  /  AlkPhos  192<H>  01-17    PT/INR - ( 16 Jan 2018 06:12 )   PT: 12.9 SEC;   INR: 1.12                    RADIOLOGY & ADDITIONAL TESTS:    Imaging Personally Reviewed:    Consultant(s) Notes Reviewed:  ID    Care Discussed with Consultants/Other Providers: ID

## 2018-01-18 VITALS
RESPIRATION RATE: 17 BRPM | HEART RATE: 66 BPM | OXYGEN SATURATION: 97 % | SYSTOLIC BLOOD PRESSURE: 127 MMHG | DIASTOLIC BLOOD PRESSURE: 69 MMHG | TEMPERATURE: 99 F

## 2018-01-18 LAB
ALBUMIN SERPL ELPH-MCNC: 2.7 G/DL — LOW (ref 3.3–5)
ALP SERPL-CCNC: 184 U/L — HIGH (ref 40–120)
ALT FLD-CCNC: 110 U/L — HIGH (ref 4–41)
AST SERPL-CCNC: 76 U/L — HIGH (ref 4–40)
BASOPHILS # BLD AUTO: 0.13 K/UL — SIGNIFICANT CHANGE UP (ref 0–0.2)
BASOPHILS NFR BLD AUTO: 0.8 % — SIGNIFICANT CHANGE UP (ref 0–2)
BILIRUB SERPL-MCNC: 0.3 MG/DL — SIGNIFICANT CHANGE UP (ref 0.2–1.2)
BUN SERPL-MCNC: 38 MG/DL — HIGH (ref 7–23)
CALCIUM SERPL-MCNC: 9.2 MG/DL — SIGNIFICANT CHANGE UP (ref 8.4–10.5)
CHLORIDE SERPL-SCNC: 106 MMOL/L — SIGNIFICANT CHANGE UP (ref 98–107)
CO2 SERPL-SCNC: 31 MMOL/L — SIGNIFICANT CHANGE UP (ref 22–31)
CREAT SERPL-MCNC: 0.73 MG/DL — SIGNIFICANT CHANGE UP (ref 0.5–1.3)
EOSINOPHIL # BLD AUTO: 0.51 K/UL — HIGH (ref 0–0.5)
EOSINOPHIL NFR BLD AUTO: 3.3 % — SIGNIFICANT CHANGE UP (ref 0–6)
GLUCOSE SERPL-MCNC: 127 MG/DL — HIGH (ref 70–99)
HCT VFR BLD CALC: 33 % — LOW (ref 39–50)
HGB BLD-MCNC: 10.1 G/DL — LOW (ref 13–17)
IMM GRANULOCYTES # BLD AUTO: 0.18 # — SIGNIFICANT CHANGE UP
IMM GRANULOCYTES NFR BLD AUTO: 1.2 % — SIGNIFICANT CHANGE UP (ref 0–1.5)
LYMPHOCYTES # BLD AUTO: 1.66 K/UL — SIGNIFICANT CHANGE UP (ref 1–3.3)
LYMPHOCYTES # BLD AUTO: 10.8 % — LOW (ref 13–44)
MAGNESIUM SERPL-MCNC: 2.3 MG/DL — SIGNIFICANT CHANGE UP (ref 1.6–2.6)
MCHC RBC-ENTMCNC: 28.9 PG — SIGNIFICANT CHANGE UP (ref 27–34)
MCHC RBC-ENTMCNC: 30.6 % — LOW (ref 32–36)
MCV RBC AUTO: 94.6 FL — SIGNIFICANT CHANGE UP (ref 80–100)
MONOCYTES # BLD AUTO: 0.79 K/UL — SIGNIFICANT CHANGE UP (ref 0–0.9)
MONOCYTES NFR BLD AUTO: 5.1 % — SIGNIFICANT CHANGE UP (ref 2–14)
NEUTROPHILS # BLD AUTO: 12.15 K/UL — HIGH (ref 1.8–7.4)
NEUTROPHILS NFR BLD AUTO: 78.8 % — HIGH (ref 43–77)
NRBC # FLD: 0 — SIGNIFICANT CHANGE UP
PHOSPHATE SERPL-MCNC: 3.6 MG/DL — SIGNIFICANT CHANGE UP (ref 2.5–4.5)
PLATELET # BLD AUTO: 716 K/UL — HIGH (ref 150–400)
PMV BLD: 9.4 FL — SIGNIFICANT CHANGE UP (ref 7–13)
POTASSIUM SERPL-MCNC: 4 MMOL/L — SIGNIFICANT CHANGE UP (ref 3.5–5.3)
POTASSIUM SERPL-SCNC: 4 MMOL/L — SIGNIFICANT CHANGE UP (ref 3.5–5.3)
PROT SERPL-MCNC: 7.9 G/DL — SIGNIFICANT CHANGE UP (ref 6–8.3)
RBC # BLD: 3.49 M/UL — LOW (ref 4.2–5.8)
RBC # FLD: 14.8 % — HIGH (ref 10.3–14.5)
SODIUM SERPL-SCNC: 147 MMOL/L — HIGH (ref 135–145)
WBC # BLD: 15.42 K/UL — HIGH (ref 3.8–10.5)
WBC # FLD AUTO: 15.42 K/UL — HIGH (ref 3.8–10.5)

## 2018-01-18 PROCEDURE — 99239 HOSP IP/OBS DSCHRG MGMT >30: CPT

## 2018-01-18 RX ORDER — SODIUM CHLORIDE 0.65 %
1 AEROSOL, SPRAY (ML) NASAL
Qty: 0 | Refills: 0 | COMMUNITY
Start: 2018-01-18

## 2018-01-18 RX ORDER — HYDROCORTISONE 1 %
1 OINTMENT (GRAM) TOPICAL
Qty: 0 | Refills: 0 | COMMUNITY
Start: 2018-01-18

## 2018-01-18 RX ORDER — SODIUM CHLORIDE 9 MG/ML
3 INJECTION INTRAMUSCULAR; INTRAVENOUS; SUBCUTANEOUS
Qty: 0 | Refills: 0 | COMMUNITY
Start: 2018-01-18

## 2018-01-18 RX ORDER — LEVETIRACETAM 250 MG/1
15 TABLET, FILM COATED ORAL
Qty: 0 | Refills: 0 | COMMUNITY
Start: 2018-01-18

## 2018-01-18 RX ORDER — AMLODIPINE BESYLATE 2.5 MG/1
1 TABLET ORAL
Qty: 0 | Refills: 0 | COMMUNITY
Start: 2018-01-18

## 2018-01-18 RX ORDER — ASPIRIN/CALCIUM CARB/MAGNESIUM 324 MG
1 TABLET ORAL
Qty: 0 | Refills: 0 | COMMUNITY
Start: 2018-01-18

## 2018-01-18 RX ORDER — SODIUM CHLORIDE 9 MG/ML
1000 INJECTION, SOLUTION INTRAVENOUS
Qty: 0 | Refills: 0 | Status: DISCONTINUED | OUTPATIENT
Start: 2018-01-18 | End: 2018-01-18

## 2018-01-18 RX ADMIN — Medication 1 APPLICATION(S): at 17:10

## 2018-01-18 RX ADMIN — LEVETIRACETAM 1500 MILLIGRAM(S): 250 TABLET, FILM COATED ORAL at 17:10

## 2018-01-18 RX ADMIN — Medication 1 TABLET(S): at 12:51

## 2018-01-18 RX ADMIN — AMLODIPINE BESYLATE 5 MILLIGRAM(S): 2.5 TABLET ORAL at 05:38

## 2018-01-18 RX ADMIN — Medication 1 APPLICATION(S): at 05:38

## 2018-01-18 RX ADMIN — HEPARIN SODIUM 5000 UNIT(S): 5000 INJECTION INTRAVENOUS; SUBCUTANEOUS at 05:38

## 2018-01-18 RX ADMIN — HEPARIN SODIUM 5000 UNIT(S): 5000 INJECTION INTRAVENOUS; SUBCUTANEOUS at 17:10

## 2018-01-18 RX ADMIN — Medication 81 MILLIGRAM(S): at 12:51

## 2018-01-18 RX ADMIN — SODIUM CHLORIDE 3 MILLILITER(S): 9 INJECTION INTRAMUSCULAR; INTRAVENOUS; SUBCUTANEOUS at 13:15

## 2018-01-18 RX ADMIN — SODIUM CHLORIDE 3 MILLILITER(S): 9 INJECTION INTRAMUSCULAR; INTRAVENOUS; SUBCUTANEOUS at 05:31

## 2018-01-18 RX ADMIN — LEVETIRACETAM 1500 MILLIGRAM(S): 250 TABLET, FILM COATED ORAL at 05:38

## 2018-01-18 NOTE — PROGRESS NOTE ADULT - PROBLEM SELECTOR PLAN 10
DVT ppx: HSQ  fall/aspiration/seizure precautions
DVT ppx: HSQ  fall/aspiration/seizure precautions
DVT ppx: HSQ  fall/aspiration/seizure precautions  skin care as per protocol
DVT ppx: HSQ  fall/aspiration/seizure precautions
DVT ppx: HSQ  fall/aspiration/seizure precautions  skin care as per protocol
DVT ppx: HSQ  fall/aspiration/seizure precautions  skin care as per protocol
DVT ppx: HSQ  fall/aspiration/seizure precautions

## 2018-01-18 NOTE — PROGRESS NOTE ADULT - PROBLEM SELECTOR PLAN 3
no recurrence of seizures.   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 No epileptic pattern or seizure seen.  MRI brain (12/26) abnormal signal involves the right cingulate gyrus-medial frontal parietal cortex as described. Some considerations include evolving   subacute ischemia, postictal cortical edema, or encephalitis.  neurology f/u noted, dilantin Dced as concern for hepatoxicity ,c/w keppra and Vimpat   GI recommend to hold all AED  if OK with neurology, case discussed with neuro, recommend c/w with Keppra and Vimpat at this time   c/w ASA 81 mg for possible subacute CVA, will DC Lipitor for now secondary to abnormal lfts   SZ precaution no recurrence of seizures.   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 No epileptic pattern or seizure seen.  MRI brain (12/26) abnormal signal involves the right cingulate gyrus-medial frontal parietal cortex as described. Some considerations include evolving   subacute ischemia, postictal cortical edema, or encephalitis.  neurology f/u noted, dilantin Dced as concern for hepatoxicity ,pt was on  keppra and Vimpat   GI recommend to hold all AED  if OK with neurology, case discussed with neuro, recommend c/w with Keppra and Vimpat at this time   Vimpat Dced on 1/11/16  case discussed with neurology , no SZ reported, LFTs improved off Vimpat  Neurology recommend monitoring off vimpat, continue with keppra'SZ precaution  F/u with neurology as outpatient     c/w ASA 81 mg for possible subacute CVA, will DC Lipitor for now secondary to abnormal lfts   SZ precaution

## 2018-01-18 NOTE — CHART NOTE - NSCHARTNOTEFT_GEN_A_CORE
NUTRITION FOLLOW-UP:  Per Pt, & RN, tolerating enteral feeding via PEG @ goal.  Per attending MD, Pt. c/o feeling thirsty.  Noted labs... elevated Na.  Free water increased, per MD.  Pt. w overall reported improvement in mental status since admission.  Per RDN discussion w RN and physician, repeat swallow evaluation suggested for consideration to advance to PO.   Pt. denies nausea/vomiting/diarrhea/constipation.  Previously, RDN documented possible wt increase, although there is questionable accuracy of weights that are documented, as now there is suspicion for possible decrease.  Questioned Pt. about usual body weight PTA, but he is unable to specify.        Weight:  130lbs [59kg] on 1/16/18.                         [54.6kg] on 1/16/18.            149.9lbs [68kg] on 1/10/18                         [61.5kg] on 12/19/17      Edema: None noted.    Skin: No noted pressure ulcers.      Pertinent Medications: MEDICATIONS  (STANDING):  amLODIPine   Tablet 5 milliGRAM(s) Oral daily  aspirin  chewable 81 milliGRAM(s) Enteral Tube daily  heparin  Injectable 5000 Unit(s) SubCutaneous every 12 hours  hydrocortisone 1% Cream 1 Application(s) Topical two times a day  levETIRAcetam  Solution 1500 milliGRAM(s) Enteral Tube two times a day  multivitamin 1 Tablet(s) Oral daily  sodium chloride 0.9% lock flush 3 milliLiter(s) IV Push every 8 hours    MEDICATIONS  (PRN):  acetaminophen  Suppository 650 milliGRAM(s) Rectal every 6 hours PRN For Temp greater than 38 C (100.4 F)  sodium chloride 0.65% Nasal 1 Spray(s) Both Nostrils four times a day PRN Congestion    Pertinent Labs:  01-18 Na147 mmol/L<H> Glu 127 mg/dL<H> K+ 4.0 mmol/L Cr  0.73 mg/dL BUN 38 mg/dL<H> Phos 3.6 mg/dL Alb 2.7 g/dL<L> PAB n/a               Current Diet:  Jevity 1.2 via PEG @ goal of 63mL/hr x 24hrs.   [provides 1892KCals & 83g protein, daily].      Nutrition Dx: Pt. remains severely malnourished        PLAN/RECOMMENDATIONS:    1) Continue current enteral regimen.  2) Obtain swallow evaluation.  3) Obtain daily weights.  4) RDN remains available and will f/u PRN.          Verónica tSevenson, AMY, CDN pager [phone]

## 2018-01-18 NOTE — PROGRESS NOTE ADULT - PROBLEM SELECTOR PLAN 7
Pt failed s/s eval  s/p PEG placement and started on  PEG feeding , tolerating it well   nutrition  recommendations noted  aspiration precaution Pt failed s/s eval  s/p PEG placement and started on  PEG feeding , tolerating it well   nutrition  recommendations noted  aspiration precaution  Pt asking for water, case discussed with Step daughter, about repeat S&S eval, speech therapy in rehab and pleasure feeds if family agrees understanding the  risk of aspiration

## 2018-01-18 NOTE — PROGRESS NOTE ADULT - NSHPATTENDINGPLANDISCUSS_GEN_ALL_CORE
House officer
House officers
House officers
JUANITA and medical team.
Resident
house officer
Dr Salcedo
House officer and medical team
house officer
neuro team
Jose Ramos
Tele Geo
patient and ADS NP
patient's daughter Chelita over the phone
patient and ADS NP
patient , CM/SW and ADS PA
patient and ADS NP
patient , CM/SW and ADS PA
patient , CM/SW and ADS PA
patient and ADS NP
patient and ADS PA

## 2018-01-18 NOTE — PROGRESS NOTE ADULT - PROBLEM SELECTOR PLAN 6
resolved  monitor electrolytes  hypophosphatemia: resolved resolved, pt has hypernatremia now, case discussed with nutrition, will add free water via PEG, 250 q 8 hrs   monitor electrolytes  hypophosphatemia: resolved

## 2018-01-18 NOTE — PROGRESS NOTE ADULT - PROBLEM SELECTOR PROBLEM 10
Need for prophylactic measure

## 2018-01-18 NOTE — PROGRESS NOTE ADULT - SUBJECTIVE AND OBJECTIVE BOX
Patient is a 77y old  Male who presents with a chief complaint of Seizure x 1 day (16 Dec 2017 01:26)      SUBJECTIVE / OVERNIGHT EVENTS: patient seen and examined by bedside at 10 AM, pt c/o feeling thirsty and asking for water ,   denies headache, dizziness, SOB, CP, Palpitations N/V/D, abdominal pain         MEDICATIONS  (STANDING):  amLODIPine   Tablet 5 milliGRAM(s) Oral daily  aspirin  chewable 81 milliGRAM(s) Enteral Tube daily  heparin  Injectable 5000 Unit(s) SubCutaneous every 12 hours  hydrocortisone 1% Cream 1 Application(s) Topical two times a day  levETIRAcetam  Solution 1500 milliGRAM(s) Enteral Tube two times a day  multivitamin 1 Tablet(s) Oral daily  sodium chloride 0.9% lock flush 3 milliLiter(s) IV Push every 8 hours    MEDICATIONS  (PRN):  acetaminophen  Suppository 650 milliGRAM(s) Rectal every 6 hours PRN For Temp greater than 38 C (100.4 F)  sodium chloride 0.65% Nasal 1 Spray(s) Both Nostrils four times a day PRN Congestion      Vital Signs Last 24 Hrs  T(C): 37.3 (18 Jan 2018 12:28), Max: 37.3 (18 Jan 2018 12:28)  T(F): 99.1 (18 Jan 2018 12:28), Max: 99.1 (18 Jan 2018 12:28)  HR: 72 (18 Jan 2018 12:28) (68 - 72)  BP: 129/76 (18 Jan 2018 12:28) (112/64 - 135/61)  BP(mean): --  RR: 17 (18 Jan 2018 12:28) (16 - 18)  SpO2: 99% (18 Jan 2018 12:28) (96% - 99%)  CAPILLARY BLOOD GLUCOSE        I&O's Summary      PHYSICAL EXAM:  GENERAL: NAD, well-developed  HEAD:  Atraumatic, Normocephalic  EYES: EOMI, PERRLA, conjunctiva and sclera clear  NECK: Supple, No JVD  CHEST/LUNG: Clear to auscultation bilaterally; No wheeze  HEART: Regular rate and rhythm; No murmurs, rubs, or gallops  ABDOMEN: Soft, Nontender, Nondistended; Bowel sounds present  EXTREMITIES:  2+ Peripheral Pulses, No clubbing, cyanosis, or edema  PSYCH: AAOx3  NEUROLOGY: non-focal  SKIN: No rashes or lesions    LABS:                        10.1   15.42 )-----------( 716      ( 18 Jan 2018 05:55 )             33.0     01-18    147<H>  |  106  |  38<H>  ----------------------------<  127<H>  4.0   |  31  |  0.73    Ca    9.2      18 Jan 2018 05:55  Phos  3.6     01-18  Mg     2.3     01-18    TPro  7.9  /  Alb  2.7<L>  /  TBili  0.3  /  DBili  x   /  AST  76<H>  /  ALT  110<H>  /  AlkPhos  184<H>  01-18              RADIOLOGY & ADDITIONAL TESTS:    Imaging Personally Reviewed:    Consultant(s) Notes Reviewed:      Care Discussed with Consultants/Other Providers: Patient is a 77y old  Male who presents with a chief complaint of Seizure x 1 day (16 Dec 2017 01:26)      SUBJECTIVE / OVERNIGHT EVENTS: patient seen and examined by bedside at 10 AM, pt c/o feeling thirsty and asking for water ,   denies headache, dizziness, SOB, CP, Palpitations N/V/D, abdominal pain         MEDICATIONS  (STANDING):  amLODIPine   Tablet 5 milliGRAM(s) Oral daily  aspirin  chewable 81 milliGRAM(s) Enteral Tube daily  heparin  Injectable 5000 Unit(s) SubCutaneous every 12 hours  hydrocortisone 1% Cream 1 Application(s) Topical two times a day  levETIRAcetam  Solution 1500 milliGRAM(s) Enteral Tube two times a day  multivitamin 1 Tablet(s) Oral daily  sodium chloride 0.9% lock flush 3 milliLiter(s) IV Push every 8 hours    MEDICATIONS  (PRN):  acetaminophen  Suppository 650 milliGRAM(s) Rectal every 6 hours PRN For Temp greater than 38 C (100.4 F)  sodium chloride 0.65% Nasal 1 Spray(s) Both Nostrils four times a day PRN Congestion      Vital Signs Last 24 Hrs  T(C): 37.3 (18 Jan 2018 12:28), Max: 37.3 (18 Jan 2018 12:28)  T(F): 99.1 (18 Jan 2018 12:28), Max: 99.1 (18 Jan 2018 12:28)  HR: 72 (18 Jan 2018 12:28) (68 - 72)  BP: 129/76 (18 Jan 2018 12:28) (112/64 - 135/61)  BP(mean): --  RR: 17 (18 Jan 2018 12:28) (16 - 18)  SpO2: 99% (18 Jan 2018 12:28) (96% - 99%)  CAPILLARY BLOOD GLUCOSE        I&O's Summary      PHYSICAL EXAM:  GENERAL: NAD, frail   HEAD:  Atraumatic, Normocephalic  NECK: Supple, No JVD  CHEST/LUNG: Clear to auscultation bilaterally; No wheeze  HEART: Regular rate and rhythm; No murmurs, rubs, or gallops  ABDOMEN: Soft, Nontender, Nondistended; Bowel sounds present, PEG +  EXTREMITIES:  Contracted, No clubbing, cyanosis, or edema  PSYCH: awake, alert  SKIN : no erythema     LABS:                        10.1   15.42 )-----------( 716      ( 18 Jan 2018 05:55 )             33.0     01-18    147<H>  |  106  |  38<H>  ----------------------------<  127<H>  4.0   |  31  |  0.73    Ca    9.2      18 Jan 2018 05:55  Phos  3.6     01-18  Mg     2.3     01-18    TPro  7.9  /  Alb  2.7<L>  /  TBili  0.3  /  DBili  x   /  AST  76<H>  /  ALT  110<H>  /  AlkPhos  184<H>  01-18              RADIOLOGY & ADDITIONAL TESTS:    Imaging Personally Reviewed:    Consultant(s) Notes Reviewed:      Care Discussed with Consultants/Other Providers:  nutrition Patient is a 77y old  Male who presents with a chief complaint of Seizure x 1 day (16 Dec 2017 01:26)      SUBJECTIVE / OVERNIGHT EVENTS: patient seen and examined by bedside at 10 AM, pt c/o feeling thirsty and asking for water ,   denies headache, dizziness, SOB, CP, Palpitations N/V/D, abdominal pain         MEDICATIONS  (STANDING):  amLODIPine   Tablet 5 milliGRAM(s) Oral daily  aspirin  chewable 81 milliGRAM(s) Enteral Tube daily  heparin  Injectable 5000 Unit(s) SubCutaneous every 12 hours  hydrocortisone 1% Cream 1 Application(s) Topical two times a day  levETIRAcetam  Solution 1500 milliGRAM(s) Enteral Tube two times a day  multivitamin 1 Tablet(s) Oral daily  sodium chloride 0.9% lock flush 3 milliLiter(s) IV Push every 8 hours    MEDICATIONS  (PRN):  acetaminophen  Suppository 650 milliGRAM(s) Rectal every 6 hours PRN For Temp greater than 38 C (100.4 F)  sodium chloride 0.65% Nasal 1 Spray(s) Both Nostrils four times a day PRN Congestion      Vital Signs Last 24 Hrs  T(C): 37.3 (18 Jan 2018 12:28), Max: 37.3 (18 Jan 2018 12:28)  T(F): 99.1 (18 Jan 2018 12:28), Max: 99.1 (18 Jan 2018 12:28)  HR: 72 (18 Jan 2018 12:28) (68 - 72)  BP: 129/76 (18 Jan 2018 12:28) (112/64 - 135/61)  BP(mean): --  RR: 17 (18 Jan 2018 12:28) (16 - 18)  SpO2: 99% (18 Jan 2018 12:28) (96% - 99%)  CAPILLARY BLOOD GLUCOSE        I&O's Summary      PHYSICAL EXAM:  GENERAL: NAD, frail   HEAD:  Atraumatic, Normocephalic  NECK: Supple, No JVD  CHEST/LUNG: Clear to auscultation bilaterally; No wheeze  HEART: Regular rate and rhythm; No murmurs, rubs, or gallops  ABDOMEN: Soft, Nontender, Nondistended; Bowel sounds present, PEG +  EXTREMITIES:  Contracted, No clubbing, cyanosis, or edema  PSYCH: awake, alert  SKIN : no erythema     LABS:                        10.1   15.42 )-----------( 716      ( 18 Jan 2018 05:55 )             33.0     01-18    147<H>  |  106  |  38<H>  ----------------------------<  127<H>  4.0   |  31  |  0.73    Ca    9.2      18 Jan 2018 05:55  Phos  3.6     01-18  Mg     2.3     01-18    TPro  7.9  /  Alb  2.7<L>  /  TBili  0.3  /  DBili  x   /  AST  76<H>  /  ALT  110<H>  /  AlkPhos  184<H>  01-18              RADIOLOGY & ADDITIONAL TESTS:    Imaging Personally Reviewed:    Consultant(s) Notes Reviewed:      Care Discussed with Consultants/Other Providers:  nutrition , neurology

## 2018-01-18 NOTE — PROGRESS NOTE ADULT - PROVIDER SPECIALTY LIST ADULT
Cardiology
Gastroenterology
Hospitalist
Infectious Disease
Internal Medicine
Internal Medicine
Neurology
Infectious Disease
Neurology
Hospitalist
Internal Medicine
Hospitalist
Hospitalist

## 2018-01-18 NOTE — PROGRESS NOTE ADULT - ATTENDING COMMENTS
case discussed with SW  will plan discharge to rehab, LFTs and leucocytosis can be monitored in the rehab case discussed with SW  will plan discharge to rehab,   LFTs and leucocytosis can be monitored in the rehab  Sx precaution  Repeat Speech eval, speech therapy and pleasure feeds if family agrees in rehab  DC to rehab once bed available   Plan of care discussed with step daughter who is HCP at   Patient hemodynamically stable for discharge to rehab

## 2018-02-08 LAB — MISCELLANEOUS - CHEM: SIGNIFICANT CHANGE UP

## 2018-05-24 NOTE — CHART NOTE - NSCHARTNOTEFT_GEN_A_CORE
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 Left her a voice mail and asked her to email me back at [email or web address]  Also Spoke to [facility] at [phone]. They stated that he had been sent to [facility] (?[facility]) in March and then did not return. They were not sure if he was still alive.   I will await contact from Chelita. GI attending    Called patient's home at [phone], but there was no answer.   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 Left her a voice mail and asked her to email me back at [email or web address]  Also Spoke to [facility] at [phone]. They stated that he had been sent to [facility] (?[facility]) in March and then did not return. They were not sure if he was still alive.   I will await contact from Chelita.      Also sent the following letter to Mr Benitez.       Dear Mr Benitez,     I hope you are well.     I called your home today at [phone], but there was no answer. I also called your step-daughter Chelita at [phone] and left a voicemail. I also Spoke to [facility] at [phone]. They stated that he had been sent to [facility] (?[facility]) in March and then did not return.     I wanted to check in and to remind you about the Dangelo's esophagus and H pylori that we had found on the endoscopy we performed in January 2018. I wanted to ensure that had seen a gastroenterologist since your hospital discharge.     Please give us a call when you can. Our office is at [phone]. You can also email me at [email or web address].     Thanks so much.     Best regards,     Slava Dubose MD MS

## 2018-06-06 NOTE — PATIENT PROFILE ADULT. - FUNCTIONAL SCREEN CURRENT LEVEL: AMBULATION, MLM
1. Caller Name: Denise                                         Call Back Number: [phone] (home)         Patient approves a detailed voicemail message: yes    On voicemail she said she hadn't been feeling well today and then looked at her hands and they were white. Would like call back.  Called Denise to get more details. She said she had felt tired, didn't feel great and hands were white. She went home and napped for an hour. Ate a good dinner. Feeling better. She will call tomorrow to give update. Should this continue /happen again, pt should be seen in UC per Amanda.   
(3) assistive equipment and person

## 2018-06-13 PROBLEM — Z00.00 ENCOUNTER FOR PREVENTIVE HEALTH EXAMINATION: Status: ACTIVE | Noted: 2018-06-13

## 2020-02-04 NOTE — PROGRESS NOTE ADULT - PROBLEM SELECTOR PROBLEM 1
Seizure Body Location Override (Optional - Billing Will Still Be Based On Selected Body Map Location If Applicable): right medial ankle inferior Detail Level: Detailed Add 72184 Cpt? (Important Note: In 2017 The Use Of 31501 Is Being Tracked By Cms To Determine Future Global Period Reimbursement For Global Periods): yes

## 2021-03-10 NOTE — PROGRESS NOTE ADULT - PROBLEM SELECTOR PLAN 1
- now controlled, no seizure last 24 hours. Last EEG showed no seizures. Neuro f/u appreciated  - MRI brain (12/26) abnormal signal involves the right cingulate gyrus-medial frontal parietal cortex as described. Some considerations include evolving   subacute ischemia, postictal cortical edema, or encephalitis.  - c/w current regimen of AEDs (fosphenytoin, keppra, vimpat)  , check Phenytoin level  -possible subacute CVA, c/w lipitor 80mg, start ASA after planned LP tomorrow to r/o HSV encephalitis per ID/neuro none

## 2022-01-17 NOTE — PRE-OP CHECKLIST - IDENTIFICATION BAND VERIFIED
[facility] Transitions Initial Follow Up Call    Outreach made within 2 business days of discharge: Yes    Patient: Zack Haley Patient : 1972   MRN: [id number]  Reason for Admission: There are no discharge diagnoses documented for the most recent discharge. Discharge Date: 21       Spoke with: patient has been in contact with PCP office.   Has HFU set up for     Discharge department/facility: [facility]    Scheduled appointment with PCP within 7-14 days    Follow Up  Future Appointments   Date Time Provider Jay Majano   2021  4:40 PM Cara Verma MD [facility]   2021  2:15 PM Jennifer Wong MD [address] done No

## 2022-09-22 NOTE — EEG REPORT - THIS IS A
Continuous Video EEG SCRIBE #1 NOTE: I, Rosalinda Long, am scribing for, and in the presence of, Africa Johnson MD. I have scribed the entire note.       History     Chief Complaint   Patient presents with    Eye Pain     Pt reports that she noticed her eye was blood shot at around 2045 and began to experience pain      Review of patient's allergies indicates:   Allergen Reactions    Aspirin Other (See Comments)     Bruising         History of Present Illness     HPI    9/21/2022, 11:08 PM  History obtained from the patient      History of Present Illness: Beth Souza is a 70 y.o. female patient with a PMHx of Cataract, HLD, HTN, and meningioma who presents to the Emergency Department for evaluation of L eye pan which onset suddenly around 8:45. When the pain began she looked in the mirror and noticed blood in her eye. She denies any visual changes, but does note a foreign body sensation. Symptoms are constant and moderate in severity. No mitigating or exacerbating factors reported. Associated sxs include a left posterior HA. Patient denies any fever, chills, weakness, numbness, dizziness, and all other sxs at this time. No further complaints or concerns at this time.       Arrival mode: Personal vehicle    PCP: Edna Oliva MD        Past Medical History:  Past Medical History:   Diagnosis Date    Cataract     Hyperlipidemia     Hypertension     Lumbar spondylosis     Meningioma 2/18/2021    Obesity     Sleep apnea     Vitamin D deficiency        Past Surgical History:  Past Surgical History:   Procedure Laterality Date    HYSTERECTOMY      PARTIAL HYSTERECTOMY           Family History:  Family History   Problem Relation Age of Onset    Hypertension Mother     Diabetes Daughter     Melanoma Neg Hx     Psoriasis Neg Hx     Lupus Neg Hx     Eczema Neg Hx     Breast cancer Neg Hx     Colon cancer Neg Hx     Ovarian cancer Neg Hx     Thrombosis Neg Hx        Social History:  Social History     Tobacco Use    Smoking status: Former      Types: Cigarettes     Quit date: 2004     Years since quittin.7    Smokeless tobacco: Never   Substance and Sexual Activity    Alcohol use: Yes     Comment: occasionally    Drug use: No    Sexual activity: Not Currently     Partners: Male        Review of Systems     Review of Systems   Constitutional:  Negative for fever.   HENT:  Negative for sore throat.    Eyes:  Positive for pain (L) and redness. Negative for visual disturbance.   Respiratory:  Negative for shortness of breath.    Cardiovascular:  Negative for chest pain.   Gastrointestinal:  Negative for nausea.   Genitourinary:  Negative for dysuria.   Musculoskeletal:  Negative for back pain.   Skin:  Negative for rash.   Neurological:  Positive for headaches (Left sided). Negative for dizziness, weakness and numbness.   Hematological:  Does not bruise/bleed easily.   All other systems reviewed and are negative.     Physical Exam     Initial Vitals [223]   BP Pulse Resp Temp SpO2   (!) 189/95 63 20 98.2 °F (36.8 °C) 97 %      MAP       --          Physical Exam  Nursing Notes and Vital Signs Reviewed.  Constitutional: Patient is in no acute distress. Well-developed and well-nourished.  Head: Atraumatic. Normocephalic.  Eyes: EOM intact. Subconjunctival hemorrhage. No foreign body noted. No eye tenderness  ENT: Mucous membranes are moist. Oropharynx is clear and symmetric.    Neck: Supple. Full ROM. No lymphadenopathy.   Cardiovascular: Regular rate. Regular rhythm. No murmurs, rubs, or gallops. Distal pulses are 2+ and symmetric.  Pulmonary/Chest: No respiratory distress. Clear to auscultation bilaterally. No wheezing or rales.  Abdominal: Soft and non-distended.  There is no tenderness.  No rebound, guarding, or rigidity.  Musculoskeletal: Moves all extremities. No obvious deformities. No edema.  Skin: Warm and dry.  Neurological:  Alert, awake, and appropriate.  Normal speech.  No acute focal neurological deficits are  "appreciated.  Psychiatric: Normal affect. Good eye contact. Appropriate in content.     ED Course   Procedures  ED Vital Signs:  Vitals:    09/21/22 2143 09/22/22 0031   BP: (!) 189/95 130/89   Pulse: 63 77   Resp: 20 18   Temp: 98.2 °F (36.8 °C) 98.9 °F (37.2 °C)   TempSrc:  Oral   SpO2: 97%    Weight: 102.2 kg (225 lb 3.2 oz)    Height: 5' 4" (1.626 m)          Imaging Results:  Imaging Results              CT Head Without Contrast (Final result)  Result time 09/21/22 23:46:13      Final result by Darinel Chao MD (09/21/22 23:46:13)                   Impression:      No acute intracranial CT abnormality.  Clinical correlation and further evaluation as warranted.    Stable left posterior fossa mass most consistent with meningioma.    All CT scans at this facility are performed  using dose modulation techniques as appropriate to performed exam including the following:  automated exposure control; adjustment of mA and/or kV according to the patients size (this includes techniques or standardized protocols for targeted exams where dose is matched to indication/reason for exam: i.e. extremities or head);  iterative reconstruction technique.      Electronically signed by: Darinel Chao  Date:    09/21/2022  Time:    23:46               Narrative:    EXAMINATION:  CT HEAD WITHOUT CONTRAST    CLINICAL HISTORY:  Headache, new or worsening (Age >= 50y);    TECHNIQUE:  Low dose axial CT images obtained throughout the head without intravenous contrast. Sagittal and coronal reconstructions were performed.    COMPARISON:  Multiple priors.    FINDINGS:  Intracranial compartment:    Ventricles and sulci are normal in size for age without evidence of hydrocephalus. No extra-axial blood or fluid collections.    Partially calcified left posterior fossa mass most consistent with meningioma but better characterized on the prior exams.    Mild microvascular ischemic change.  No parenchymal mass, hemorrhage, edema or major vascular " distribution infarct.    Skull/extracranial contents (limited evaluation): No fracture. Mastoid air cells and paranasal sinuses are essentially clear.                                                The Emergency Provider reviewed the vital signs and test results, which are outlined above.     ED Discussion       12:21 AM: Reassessed pt at this time.  Discussed with pt all pertinent ED information and results. Discussed pt dx and plan of tx. Gave pt all f/u and return to the ED instructions. All questions and concerns were addressed at this time. Pt expresses understanding of information and instructions, and is comfortable with plan to discharge. Pt is stable for discharge.    I discussed with patient and/or family/caretaker that evaluation in the ED does not suggest any emergent or life threatening medical conditions requiring immediate intervention beyond what was provided in the ED, and I believe patient is safe for discharge.  Regardless, an unremarkable evaluation in the ED does not preclude the development or presence of a serious of life threatening condition. As such, patient was instructed to return immediately for any worsening or change in current symptoms.         Medical Decision Making:   Clinical Tests:   Radiological Study: Ordered and Reviewed         ED Medication(s):  Medications - No data to display    Discharge Medication List as of 9/22/2022 12:19 AM           Follow-up Information       Edna Oliva MD In 1 day.    Specialty: Family Medicine  Contact information:  [address]  [place] [address] [phone]               [facility] - Emergency Dept..    Specialty: Emergency Medicine  Why: As needed, If symptoms worsen  Contact information:  [address] [id number] [phone]                               Scribe Attestation:   Scribe #1: I performed the above scribed service and the documentation accurately describes the services I performed. I  attest to the accuracy of the note.     Attending:   Physician Attestation Statement for Scribe #1: I, Africa Johnson MD, personally performed the services described in this documentation, as scribed by Rosalinda Long, in my presence, and it is both accurate and complete.           Clinical Impression       ICD-10-CM ICD-9-CM   1. Subconjunctival hemorrhage of left eye  H11.32 372.72   2. Acute nonintractable headache, unspecified headache type  R51.9 784.0   3. Primary hypertension  I10 401.9       Disposition:   Disposition: Discharged  Condition: Stable       Africa Johnson MD  09/22/22 0594

## 2023-04-04 NOTE — PROGRESS NOTE ADULT - PROBLEM SELECTOR PLAN 7
[FreeTextEntry1] : Meds reviewed and reconciled. \par HEATHER forms completed as per request - will fax back to facility. \par \par Recent labs reviewed in EMR. \par Hgb normalized. \par Monitor off Eliquis now. \par \par h/o DVT\par Now with +RLE swelling/redness \par Will check venous US RLE r/o DVT\par c/w Leg elevation when not walking\par \par c/w topical steroids as per derm. f/uw ith derm in 2 weeks as planned. Consider taper off steroids\par \par Off colchicine. Stable. Monitor for recurrence. \par \par Mood/sleep now controlled. \par c/w Zoloft 50mg daily for now. Will consider further taper at next visit if stable. \par f/u with herber Lantigua\par - Monitor closely for s/s of serotonin syndrome/serotonin toxicity (eg, hyperreflexia, clonus, hyperthermia, diaphoresis, tremor, autonomic instability, mental status changes) while on Remeron and Zoloft\par \par - c/w 24/7 supervision for safety and help with ADLs and for comfort/socialization and monitoring of symptoms\par HIGH risk for falls/injury\par Maximize sensory input - f/u with optho, consider ENT/audiology f/u and trial HAs\par Fall precautions\par \par f/u copy of MOLST \par \par AWV pend\par Plan for repeat Cog eval \par \par - f/u after venous US completed, then in 3 mo, unless earlier PRN \par \par  -BP controlled, d/c hydralazine

## 2023-06-21 NOTE — PROGRESS NOTE ADULT - PROBLEM SELECTOR PLAN 7
[facility] Progress Note      Subjective  Pt seen and examined.    No new complaints today.  No fever night sweats.  States that he would like to go home.  No nausea vomiting or diarrhea.  As per RN patient was angry earlier about being in the hospital.     Medications  Current Facility-Administered Medications   Medication Dose Route Frequency Provider Last Rate Last Admin   • potassium phosphate/sodium phosphate (K PHOS NEUTRAL) tablet 250 mg  1 tablet Oral BID Jason Monroy MD   250 mg at 06/21/23 1344   • ceftoloZANE-tazobactam (ZERBAXA) 3,000 mg in sodium chloride 0.9 % 250 mL IVPB  3,000 mg Intravenous 3 times per day Radha Zepeda DO 62.5 mL/hr at 06/21/23 1344 3,000 mg at 06/21/23 1344   • fentaNYL (SUBLIMAZE) injection 25 mcg  25 mcg Intravenous Q10 Min PRN Carlos A Bowie H, DO       • ciprofloxacin (CIPRO) tablet 750 mg  750 mg Oral BID Abreakfast & HS Radha Zepeda DO   750 mg at 06/21/23 0604   • metoPROLOL tartrate (LOPRESSOR) tablet 25 mg  25 mg Oral 2 times per day David Pascual MD   25 mg at 06/21/23 0943   • metoCLOPramide (REGLAN) tablet 5 mg  5 mg Oral TID AC David Pascual MD   5 mg at 06/21/23 1344   • sodium chloride (PF) 0.9 % injection 10 mL  10 mL Injection PRN David Pascual MD       • sodium chloride (PF) 0.9 % injection 10 mL  10 mL Injection 2 times per day David Pascual MD   10 mL at 06/20/23 2112   • TACROlimus (PROGRAF) capsule 0.5 mg  0.5 mg Oral BIDMaxim Ureña MD   0.5 mg at 06/21/23 0944   • docusate sodium (COLACE) capsule 100 mg  100 mg Oral BID David Pascual MD   100 mg at 06/20/23 2112   • bisacodyl (DULCOLAX) suppository 10 mg  10 mg Rectal Daily PRN David Pascual MD       • mycophenolate (MYFORTIC) DR tablet 360 mg  360 mg Oral BIDTX Jenna Vargas MD   360 mg at 06/21/23 0944   • baclofen (LIORESAL) tablet 5 mg  5 mg Oral 2 times per day Jayy Posey DO   5 mg at 06/21/23 0944   •  ondansetron (ZOFRAN) injection 4 mg  4 mg Intravenous Q12H PRN Hipolito Groveseus, DO   4 mg at 06/10/23 1730   • prochlorperazine (COMPAZINE) injection 5 mg  5 mg Intravenous Q6H PRN Hipolito Groveseus, DO   5 mg at 06/12/23 1636   • acetaminophen (TYLENOL) tablet 650 mg  650 mg Oral Q4H PRN Srinivasan Groves, DO   650 mg at 06/12/23 1827   • predniSONE (DELTASONE) tablet 5 mg  5 mg Oral Daily with breakfast Kyara Brush DO   5 mg at 06/21/23 0943   • aspirin chewable 81 mg  81 mg Oral Daily Cary Rosas MD   81 mg at 06/21/23 0944   • atorvastatin (LIPITOR) tablet 80 mg  80 mg Oral Nightly Cary Rosas MD   80 mg at 06/20/23 2105   • sodium chloride 0.9 % flush bag 25 mL  25 mL Intravenous PRN Manfred Al MD   Completed at 06/20/23 1910   • sodium chloride (PF) 0.9 % injection 2 mL  2 mL Intracatheter 2 times per day Manfred Al MD   2 mL at 06/20/23 2113   • sodium chloride 0.9% infusion   Intravenous Continuous PRN Manfred Al MD       • sodium chloride 0.9% infusion   Intravenous Continuous PRN Manfred Al MD       • sodium chloride (NORMAL SALINE) 0.9 % bolus 500 mL  500 mL Intravenous PRN Manfred Al MD       • enoxaparin (LOVENOX) injection 40 mg  40 mg Subcutaneous Daily Manfred Al MD   40 mg at 06/21/23 0944   • dextrose 50 % injection 25 g  25 g Intravenous PRN Manfred Al MD       • dextrose 50 % injection 12.5 g  12.5 g Intravenous PRN Manfred Al MD       • glucagon (GLUCAGEN) injection 1 mg  1 mg Intramuscular PRN Manfred Al MD       • dextrose (GLUTOSE) 40 % gel 15 g  15 g Oral PRN Manfred Al MD       • dextrose (GLUTOSE) 40 % gel 30 g  30 g Oral PRN Manfred Al MD            Vitals:     Vitals with min/max:    Vital Last Value 24 Hour Range   Temperature 97.5 °F (36.4 °C) (06/21/23 1330) Temp  Min: 97.3 °F (36.3 °C)  Max: 98.8 °F (37.1 °C)   Pulse 70 (06/21/23 1330) Pulse  Min: 64  Max: 89   Respiratory 18 (06/21/23 1330) Resp  Min: 16   Max: 18   Non-Invasive  Blood Pressure 110/72 (06/21/23 1330) BP  Min: 89/57  Max: 122/81   Pulse Oximetry 95 % (06/21/23 1330) SpO2  Min: 95 %  Max: 100 %   Arterial   Blood Pressure   No data recorded       Intake/Output Summary (Last 24 hours) at 6/21/2023 1406  Last data filed at 6/21/2023 1200  Gross per 24 hour   Intake 1732.61 ml   Output 1250 ml   Net 482.61 ml        Physical Exam  Physical Exam  Constitutional:       General: He is not in acute distress.  Cardiovascular:      Rate and Rhythm: Normal rate and regular rhythm.   Pulmonary:      Effort: Pulmonary effort is normal. No respiratory distress.      Breath sounds: No wheezing.   Abdominal:      General: Bowel sounds are normal. There is no distension.      Palpations: Abdomen is soft.      Tenderness: There is no abdominal tenderness.   Musculoskeletal:         General: No swelling. Normal range of motion.   Skin:     General: Skin is warm.      Coloration: Skin is not jaundiced.   Neurological:      General: No focal deficit present.      Mental Status: He is alert and oriented to person, place, and time.   Psychiatric:         Mood and Affect: Mood normal.          Imaging  No results found.    Labs     Recent Labs   Lab 06/21/23  0514 06/20/23  0458 06/19/23  1312 06/19/23  0837   WBC 6.2 9.2  --  6.8   HCT 44.9 43.3  --  49.8   HGB 14.1 13.8  --  16.1    267  --  284   SODIUM 139 138 137  --    POTASSIUM 4.6 4.7 4.5  --    CHLORIDE 112* 110 110  --    CO2 22 22 25  --    CALCIUM 9.8 9.4 10.2  --    GLUCOSE 100* 130* 115*  --    BUN 49* 40* 33*  --    CREATININE 1.19* 1.13 1.16  --    AST 32 25 30  --    GPT 27 24 33  --    ALKPT 180* 182* 245*  --    BILIRUBIN 0.6 0.4 0.6  --    ALBUMIN 2.0* 2.0* 2.4*  --    PHOS 2.2* 2.7 2.1*  --        Assessment:  High grade PSAR  different strains with some MDRO  6/15 ngtd  #klebsiella pneumonia Bacteruria vs possible transplant pyelo  #Cholelithiasis with gallbladder wall thickening and distention,  w/o symptoms  #-h/o renal transplant, follows with [facility]  - Klebsiella UTI - possible transplant pyelo  - Poor intake for a couple weeks leading up to admission  - Lower BP given infection and poor intake - improved post albumin  History of renal transplant at [facility] on March 2017.  Patient follows up today  Mildly elevated tacrolimus level on 6/15 improving.  Dose adjusted  Encephalopathy 2/2 sepsis  - Mild hyponatremia possibly from limited intake  - Resistant hiccups - not uremic in nature   Hyponatremia-improving  Malnutrition moderate both caloric , protein  Hypotension    KE:  Left ventricle: The cavity size is mildly reduced. Wall thickness is     severely increased. Systolic function is normal.  2. Aortic valve: A bioprosthetic valve is present. There is a possible     vegetation on the aortic aspect. There is mild regurgitation.  3. Mitral valve: There is a vegetation on the atrial aspect of the anterior     leaflet. There is severe regurgitation. Severe regurgitation is suggested     by pulmonary vein systolic flow reversal.  4. Left atrium: The atrium is dilated.  5. Right ventricle: The cavity size is normal. Systolic function is normal.  6. Tricuspid valve: There is a possible small vegetation. There is mild     regurgitation.  7. Pericardium, extracardiac: There is no pericardial effusion.        Plan:   (Abx  Per ID  Zebraxa/Cipro.  Mid line placement  Blood Cx 2x (6/15) negative and tagged WBC scan pending;    U/S of AVF - soft tissue edema  TTE (6/12); EF is 65% .  KE  on 6/19:  bioprosthetic valve with possible vegetation on the aortic aspect dry valve with a vegetation on the anterior leaflet and severe regurgitation.  Possible small vegetation on tricuspid valve  CT chest - neg  Facial CT- neg  Vascular surgery consult  Nutritional support  Consider Megace for appetite stimulation  If not eating next few days  Out of bed, PT  ID following  - Nephrology following for hyponatremia.  Goal  sodium is above 130   Consulted CT surgery recommends no intervention at this time since patient does not have clinical heart failure embolism or heart block.  After completion of 6 weeks of antibiotics and if still severe MR, recommending MitraClip.  I I communicated with ID    DIET: Regular   DVT PROPHYLAXIS: Lovenox     Code Status: Full Resuscitation    Disposition:Pending placement and ID clearance.  Placement limitation due to course of IV antibiotics.  Plan is for ECF placement once cleared by ID.       6/20/23 Lengthy conversation with patient son Fernando.  I was able to answer all questions.  I discussed plan of care in detail with him.      WANDA:TBD    Primary Care Physician  David Pascual MD    All patient questions answered  All labs and imaging reviewed        Note to the patient:  the 21st Century Cures Act makes medical notes like these available to patients in the interest of transparency. Please be advised that this is a medical document. Medical documents are intended to carry relevant information, facts as evident, and the clinical opinion of the practitioner. The medical note is intended as peer to peer communication, and may appear blunt or direct. It is written in medical language, and may contain abbreviations or verbiage that are unfamiliar.     DVT ppx: HSQ  fall/aspiration/seizure precautions

## 2024-11-26 NOTE — PROGRESS NOTE ADULT - PROBLEM SELECTOR PLAN 1
Patient's most recent potassium results are listed below.   Recent Labs     11/24/24  0852 11/26/24  0220   K 3.5 3.9       Plan  - Replete potassium per protocol  - Monitor potassium Daily  - Patient's hypokalemia is stable   - still having seizures (seen on vEEG), while on keppra/fosphenytoin and vimpat, though per vEEG and d/w neuro, frequency improving (only 5 seizures since 2AM)... neuro adjusting dose of vimpat  - however still lethargic/unresponsive   - continue to monitor vEEG, monitor respiratory status/airway  - EEG with  focal dysfunction in the right anterior temporal region that is potentially epileptogenic, suggestive of a possible structural abnormality  -MRI with nohemy to eval for structural lesion pending clinical stability, Consent obtained by PA, in chart, will need to attempt imaging when patient more stable  - appreciate neuro input  - d/w neuro utility of doing LP, to consider if other infectious w/u negative...  - also d/w ELLIOTT during IDR today

## 2025-07-11 NOTE — ED PROVIDER NOTE - ALCOHOL USE HISTORY SINGLE SELECT
Anesthesia Pre Eval Note    OB/Gyn Evaluation    Current Pregnancy  (+) pre-eclampsia   Obstetric History       Anesthesia ROS/Med Hx          Neuro/Psych Review:       Positive for psychiatric history - Depression    Cardiovascular Review:     Positive for hypertension  Positive for hyperlipidemia    GI/HEPATIC/RENAL Review:     Positive for renal disease - nephrolithiasis    End/Other Review:  Positive for diabetes (GDM)      Relevant Problems   No relevant active problems       Physical Exam     Airway   Mallampati: II  TM Distance: >3 FB  Neck ROM: Full    Cardiovascular  Cardiovascular exam normal    Head Assessment  Head assessment: Normocephalic and Atraumatic    General Assessment  General Assessment: Alert and oriented    Dental Exam  Dental exam normal    Pulmonary Exam  Pulmonary exam normal  Breath sounds clear to auscultation:  Yes  Patient Demonstrates:  Non-labored Breathing    Abdominal Exam  Abdominal exam normal  Patient Demonstrates:  Gravid        Patient Vitals for the past 4 hrs (Last 1 readings):   BP Temp Temp src Pulse Resp Height Weight   07/11/25 1400 (!) 146/98 36.6 °C (97.8 °F) Temporal 99 17 5' 2\" (1.575 m) 76.7 kg (169 lb)         Anesthesia Plan:    ASA Status: 2  Anesthesia Type: Spinal    Premedication: Oral and IV      Post-op Pain Management: Per Surgeon and Neuraxial Narcotic      Checklist  Reviewed: Lab Results, Pregnancy Test Results, NPO Status, Consultations, Problem list, Medications, Allergies and Past Med History  Consent/Risks Discussed Statement:  The proposed anesthetic plan, including its risks and benefits, have been discussed with the Patient along with the risks and benefits of alternatives. Questions were encouraged and answered and the patient and/or representative understands and agrees to proceed.        I discussed with the patient (and/or patient's legal representative) the risks and benefits of the proposed anesthesia plan, Spinal, which may include services  performed by other anesthesia providers.    Alternative anesthesia plans, if available, were reviewed with the patient (and/or patient's legal representative). Discussion has been held with the patient (and/or patient's legal representative) regarding risks of anesthesia, which include allergic reaction, aspiration, back pain, conversion to general anesthesia, headache, hypotension, infection, nerve injury, vomiting and nausea and emergent situations that may require change in anesthesia plan.    The patient (and/or patient's legal representative) has indicated understanding, his/her questions have been answered, and he/she wishes to proceed with the planned anesthetic.    Informed Consent for Blood: Consented     unknown